# Patient Record
Sex: MALE | Race: WHITE | Employment: FULL TIME | URBAN - METROPOLITAN AREA
[De-identification: names, ages, dates, MRNs, and addresses within clinical notes are randomized per-mention and may not be internally consistent; named-entity substitution may affect disease eponyms.]

---

## 2018-08-14 PROBLEM — I10 ESSENTIAL HYPERTENSION: Status: ACTIVE | Noted: 2018-08-14

## 2019-12-04 PROBLEM — M50.20 CERVICAL DISC HERNIATION: Status: ACTIVE | Noted: 2019-12-04

## 2019-12-04 PROBLEM — M54.10 RADICULOPATHY: Status: ACTIVE | Noted: 2019-12-04

## 2021-10-26 PROBLEM — E11.9 TYPE 2 DIABETES MELLITUS WITHOUT COMPLICATION, WITHOUT LONG-TERM CURRENT USE OF INSULIN (HCC): Status: ACTIVE | Noted: 2021-10-26

## 2022-01-24 PROBLEM — Z80.0 FAMILY HISTORY OF COLON CANCER IN FATHER: Status: ACTIVE | Noted: 2022-01-24

## 2022-11-07 ENCOUNTER — HOSPITAL ENCOUNTER (OUTPATIENT)
Dept: MRI IMAGING | Age: 52
Discharge: HOME | End: 2022-11-09
Payer: COMMERCIAL

## 2022-11-07 DIAGNOSIS — S67.22XA CRUSHING INJURY OF LEFT HAND, INITIAL ENCOUNTER: ICD-10-CM

## 2022-11-07 PROCEDURE — 73218 MRI UPPER EXTREMITY W/O DYE: CPT

## 2022-11-08 ENCOUNTER — OFFICE VISIT (OUTPATIENT)
Dept: FAMILY MEDICINE CLINIC | Age: 52
End: 2022-11-08
Payer: COMMERCIAL

## 2022-11-08 ENCOUNTER — HOSPITAL ENCOUNTER (OUTPATIENT)
Age: 52
Discharge: HOME | End: 2022-11-08
Payer: COMMERCIAL

## 2022-11-08 VITALS
TEMPERATURE: 97.7 F | HEART RATE: 62 BPM | WEIGHT: 315 LBS | DIASTOLIC BLOOD PRESSURE: 70 MMHG | OXYGEN SATURATION: 97 % | SYSTOLIC BLOOD PRESSURE: 110 MMHG | BODY MASS INDEX: 44 KG/M2

## 2022-11-08 DIAGNOSIS — E11.9 TYPE 2 DIABETES MELLITUS WITHOUT COMPLICATION, WITHOUT LONG-TERM CURRENT USE OF INSULIN (HCC): Primary | ICD-10-CM

## 2022-11-08 DIAGNOSIS — E11.9 TYPE 2 DIABETES MELLITUS WITHOUT COMPLICATION, WITHOUT LONG-TERM CURRENT USE OF INSULIN (HCC): ICD-10-CM

## 2022-11-08 DIAGNOSIS — I10 ESSENTIAL HYPERTENSION: ICD-10-CM

## 2022-11-08 DIAGNOSIS — Z13.220 SCREENING FOR HYPERLIPIDEMIA: ICD-10-CM

## 2022-11-08 LAB
ABSOLUTE EOS #: 0.2 K/UL (ref 0–0.4)
ABSOLUTE LYMPH #: 1.9 K/UL (ref 1–4.8)
ABSOLUTE MONO #: 0.5 K/UL (ref 0–1)
ALBUMIN SERPL-MCNC: 4.5 G/DL (ref 3.5–5.2)
ALP BLD-CCNC: 64 U/L (ref 40–129)
ALT SERPL-CCNC: 40 U/L (ref 5–41)
ANION GAP SERPL CALCULATED.3IONS-SCNC: 9 MMOL/L (ref 9–17)
AST SERPL-CCNC: 34 U/L
BASOPHILS # BLD: 1 % (ref 0–2)
BASOPHILS ABSOLUTE: 0 K/UL (ref 0–0.2)
BILIRUB SERPL-MCNC: 0.9 MG/DL (ref 0.3–1.2)
BUN BLDV-MCNC: 22 MG/DL (ref 6–20)
BUN/CREAT BLD: 25 (ref 9–20)
CALCIUM SERPL-MCNC: 9.4 MG/DL (ref 8.6–10.4)
CHLORIDE BLD-SCNC: 100 MMOL/L (ref 98–107)
CHOLESTEROL/HDL RATIO: 5.6
CHOLESTEROL: 189 MG/DL
CO2: 26 MMOL/L (ref 20–31)
CREAT SERPL-MCNC: 0.89 MG/DL (ref 0.7–1.2)
DIFFERENTIAL TYPE: YES
EOSINOPHILS RELATIVE PERCENT: 3 % (ref 0–5)
GFR SERPL CREATININE-BSD FRML MDRD: >60 ML/MIN/1.73M2
GLUCOSE BLD-MCNC: 121 MG/DL (ref 70–99)
HBA1C MFR BLD: 5.7 %
HCT VFR BLD CALC: 45.9 % (ref 41–53)
HDLC SERPL-MCNC: 34 MG/DL
HEMOGLOBIN: 15.5 G/DL (ref 13.5–17.5)
LDL CHOLESTEROL: 135 MG/DL (ref 0–130)
LYMPHOCYTES # BLD: 35 % (ref 13–44)
MCH RBC QN AUTO: 29.4 PG (ref 26–34)
MCHC RBC AUTO-ENTMCNC: 33.7 G/DL (ref 31–37)
MCV RBC AUTO: 87.2 FL (ref 80–100)
MONOCYTES # BLD: 9 % (ref 5–9)
PATIENT FASTING?: YES
PDW BLD-RTO: 13 % (ref 12.1–15.2)
PLATELET # BLD: 180 K/UL (ref 140–450)
POTASSIUM SERPL-SCNC: 4.7 MMOL/L (ref 3.7–5.3)
RBC # BLD: 5.27 M/UL (ref 4.5–5.9)
SEG NEUTROPHILS: 52 % (ref 39–75)
SEGMENTED NEUTROPHILS ABSOLUTE COUNT: 2.8 K/UL (ref 2.1–6.5)
SODIUM BLD-SCNC: 135 MMOL/L (ref 135–144)
TOTAL PROTEIN: 7.6 G/DL (ref 6.4–8.3)
TRIGL SERPL-MCNC: 99 MG/DL
WBC # BLD: 5.4 K/UL (ref 3.5–11)

## 2022-11-08 PROCEDURE — G8484 FLU IMMUNIZE NO ADMIN: HCPCS | Performed by: NURSE PRACTITIONER

## 2022-11-08 PROCEDURE — 3044F HG A1C LEVEL LT 7.0%: CPT | Performed by: NURSE PRACTITIONER

## 2022-11-08 PROCEDURE — 80061 LIPID PANEL: CPT

## 2022-11-08 PROCEDURE — 2022F DILAT RTA XM EVC RTNOPTHY: CPT | Performed by: NURSE PRACTITIONER

## 2022-11-08 PROCEDURE — 83036 HEMOGLOBIN GLYCOSYLATED A1C: CPT | Performed by: NURSE PRACTITIONER

## 2022-11-08 PROCEDURE — 36415 COLL VENOUS BLD VENIPUNCTURE: CPT

## 2022-11-08 PROCEDURE — 1036F TOBACCO NON-USER: CPT | Performed by: NURSE PRACTITIONER

## 2022-11-08 PROCEDURE — G8427 DOCREV CUR MEDS BY ELIG CLIN: HCPCS | Performed by: NURSE PRACTITIONER

## 2022-11-08 PROCEDURE — 3078F DIAST BP <80 MM HG: CPT | Performed by: NURSE PRACTITIONER

## 2022-11-08 PROCEDURE — 80053 COMPREHEN METABOLIC PANEL: CPT

## 2022-11-08 PROCEDURE — 3074F SYST BP LT 130 MM HG: CPT | Performed by: NURSE PRACTITIONER

## 2022-11-08 PROCEDURE — G8417 CALC BMI ABV UP PARAM F/U: HCPCS | Performed by: NURSE PRACTITIONER

## 2022-11-08 PROCEDURE — 3017F COLORECTAL CA SCREEN DOC REV: CPT | Performed by: NURSE PRACTITIONER

## 2022-11-08 PROCEDURE — 85025 COMPLETE CBC W/AUTO DIFF WBC: CPT

## 2022-11-08 PROCEDURE — 99214 OFFICE O/P EST MOD 30 MIN: CPT | Performed by: NURSE PRACTITIONER

## 2022-11-08 SDOH — ECONOMIC STABILITY: FOOD INSECURITY: WITHIN THE PAST 12 MONTHS, YOU WORRIED THAT YOUR FOOD WOULD RUN OUT BEFORE YOU GOT MONEY TO BUY MORE.: NEVER TRUE

## 2022-11-08 SDOH — ECONOMIC STABILITY: FOOD INSECURITY: WITHIN THE PAST 12 MONTHS, THE FOOD YOU BOUGHT JUST DIDN'T LAST AND YOU DIDN'T HAVE MONEY TO GET MORE.: NEVER TRUE

## 2022-11-08 ASSESSMENT — ENCOUNTER SYMPTOMS
NAUSEA: 0
SHORTNESS OF BREATH: 0
COUGH: 0
DIARRHEA: 0
VOMITING: 0

## 2022-11-08 ASSESSMENT — SOCIAL DETERMINANTS OF HEALTH (SDOH): HOW HARD IS IT FOR YOU TO PAY FOR THE VERY BASICS LIKE FOOD, HOUSING, MEDICAL CARE, AND HEATING?: NOT HARD AT ALL

## 2022-11-08 NOTE — PATIENT INSTRUCTIONS
SURVEY:    You may be receiving a survey from [employer] regarding your visit today. Please complete the survey to enable us to provide the highest quality of care to you and your family. If you cannot score us a very good (5 Stars) on any question, please call the office to discuss how we could have made your experience a very good one. Thank you.     Clinical Care Team: NICOL Wilson-INES Chen LPN    Clerical Team: Brain Laguerre

## 2022-11-08 NOTE — PROGRESS NOTES
HPI Notes    Name: Glendy Farias  : 1970         Chief Complaint:     Chief Complaint   Patient presents with    Diabetes Mellitus     Last A1C was 5.4 in     Hypertension       History of Present Illness:        Hypertension  This is a chronic problem. The current episode started more than 1 year ago. The problem has been gradually improving since onset. The problem is controlled. Pertinent negatives include no chest pain, headaches, palpitations, peripheral edema or shortness of breath. Risk factors for coronary artery disease include diabetes mellitus, male gender and obesity. Past treatments include ACE inhibitors. The current treatment provides moderate improvement. There are no compliance problems. Diabetes  He presents for his follow-up diabetic visit. He has type 2 diabetes mellitus. His disease course has been stable. There are no hypoglycemic associated symptoms. Pertinent negatives for hypoglycemia include no dizziness, headaches or seizures. Pertinent negatives for diabetes include no chest pain. Symptoms are stable. Risk factors for coronary artery disease include diabetes mellitus, hypertension, male sex and obesity. Current diabetic treatment includes diet. He is following a generally healthy diet. His breakfast blood glucose is taken between 6-7 am. His breakfast blood glucose range is generally 110-130 mg/dl. An ACE inhibitor/angiotensin II receptor blocker is being taken.      Past Medical History:     Past Medical History:   Diagnosis Date    Arthritis     Hypertension     on meds x 2yrs    Type 2 diabetes mellitus without complication, without long-term current use of insulin ([facility] 10/26/2021    Unspecified sleep apnea     CPAP nightly use      Reviewed all health maintenance requirements and ordered appropriate tests  Health Maintenance Due   Topic Date Due    Pneumococcal 0-64 years Vaccine (1 - PCV) Never done    HIV screen  Never done    Hepatitis C screen  Never done Hepatitis B vaccine (1 of 3 - Risk 3-dose series) Never done    Shingles vaccine (1 of 2) Never done    COVID-19 Vaccine (4 - Booster for Moderna series) 03/06/2022    Lipids  07/06/2022    Diabetic microalbuminuria test  07/20/2022    Flu vaccine (1) 08/01/2022       Past Surgical History:     Past Surgical History:   Procedure Laterality Date    CERVICAL FUSION N/A 12/5/2019    A.C.D.F. C 6-7 performed by Jorge Luis Diego MD at [address] [facility] N/A 1/24/2022    COLONOSCOPY POLYPECTOMY HOT BIOPSY performed by Juan Antonio Richey MD at [address] [place] [address]    left hip surgical repair post fx    HIP SURGERY Left     plates    JOINT REPLACEMENT Left 1997    hip    TONSILLECTOMY          Medications:       Prior to Admission medications    Medication Sig Start Date End Date Taking? Authorizing Provider   lisinopril (PRINIVIL;ZESTRIL) 20 MG tablet Take 1 tablet by mouth daily 6/16/22  Yes Tivis List, APRN - CNP   blood glucose monitor kit and supplies Please dispense glucometer per patient's insurance. 2/7/22  Yes Tivis List, APRN - CNP   Lancets MISC 1 each by Does not apply route 2 times daily Please dispense per patient's insurance 2/7/22  Yes Tivis List, APRN - CNP   blood glucose monitor strips Please check blood glucose daily, dispense per patient's insurance 2/7/22  Yes Tivis List, APRN - CNP   CPAP Machine MISC 1 Units by Does not apply route nightly   Yes Historical Provider, MD        Allergies:       Bactrim    Social History:     Tobacco:    reports that he quit smoking about 7 years ago. His smoking use included cigarettes. He started smoking about 9 years ago. He has a 0.50 pack-year smoking history. He has never used smokeless tobacco.  Alcohol:      reports current alcohol use. Drug Use:  reports no history of drug use.     Family History:     Family History   Problem Relation Age of Onset    Cancer Father         colon    High Blood Pressure Father Asthma Mother     No Known Problems Sister     No Known Problems Brother     Mental Illness Daughter         depression, anxiety       Review of Systems:         Review of Systems   Constitutional:  Negative for chills and fever. Respiratory:  Negative for cough and shortness of breath. Cardiovascular:  Negative for chest pain and palpitations. Gastrointestinal:  Negative for diarrhea, nausea and vomiting. Neurological:  Negative for dizziness, seizures and headaches. Physical Exam:     Vitals:  /70   Pulse 62   Temp 97.7 °F (36.5 °C) (Oral)   Wt (!) 352 lb (159.7 kg)   SpO2 97%   BMI 44.00 kg/m²       Physical Exam  Vitals and nursing note reviewed. Constitutional:       Appearance: He is well-developed. Cardiovascular:      Rate and Rhythm: Normal rate and regular rhythm. Heart sounds: S1 normal and S2 normal.   Pulmonary:      Effort: Pulmonary effort is normal. No respiratory distress. Breath sounds: Normal breath sounds. Abdominal:      General: Bowel sounds are normal.      Palpations: Abdomen is soft. Tenderness: There is no abdominal tenderness. Feet:      Right foot:      Protective Sensation: 8 sites tested. 8 sites sensed. Left foot:      Protective Sensation: 8 sites tested. 8 sites sensed. Skin:     General: Skin is warm and dry. Psychiatric:         Behavior: Behavior is cooperative.              Data:     Lab Results   Component Value Date/Time     07/06/2021 07:13 AM    K 4.8 07/06/2021 07:13 AM     07/06/2021 07:13 AM    CO2 26 07/06/2021 07:13 AM    BUN 18 07/06/2021 07:13 AM    CREATININE 0.94 07/06/2021 07:13 AM    GLUCOSE 141 07/06/2021 07:13 AM    GLUCOSE 114 04/21/2012 08:34 PM    PROT 6.9 07/06/2021 07:13 AM    LABALBU 4.2 07/06/2021 07:13 AM    BILITOT 0.74 07/06/2021 07:13 AM    ALKPHOS 66 07/06/2021 07:13 AM    AST 43 07/06/2021 07:13 AM    ALT 50 07/06/2021 07:13 AM     Lab Results   Component Value Date/Time    WBC 5.9 07/06/2021 07:13 AM    RBC 4.66 07/06/2021 07:13 AM    RBC 5.43 04/21/2012 08:34 PM    HGB 14.2 07/06/2021 07:13 AM    HCT 40.8 07/06/2021 07:13 AM    MCV 87.7 07/06/2021 07:13 AM    MCH 30.4 07/06/2021 07:13 AM    MCHC 34.7 07/06/2021 07:13 AM    RDW 13.5 07/06/2021 07:13 AM     07/06/2021 07:13 AM     04/21/2012 08:34 PM    MPV NOT REPORTED 07/06/2021 07:13 AM     No results found for: TSH  Lab Results   Component Value Date/Time    CHOL 161 07/06/2021 07:13 AM    HDL 33 07/06/2021 07:13 AM    LABA1C 5.4 05/03/2022 06:05 AM          Assessment & Plan        Diagnosis Orders   1. Type 2 diabetes mellitus without complication, without long-term current use of insulin (HCC)   --A1c=5.7% (previously 5.4%). pt doing very well with diet and exercise. Continue same. POCT glycosylated hemoglobin (Hb A1C)    POCT microalbumin    HM DIABETES FOOT EXAM    CBC with Auto Differential    Comprehensive Metabolic Panel      2. Essential hypertension   --BP well controlled at this time. Continue lisinopril 20mg daily. CBC with Auto Differential      3. Screening for hyperlipidemia   --ASCVD risk was 7.5% in 2021. Will recheck lipid panel. Lipid Panel        Patient verbalizes understanding and agreement with plan. All questions answered. If symptoms do not resolve or worsen, return to office. Completed Refills   Requested Prescriptions      No prescriptions requested or ordered in this encounter     No follow-ups on file. No orders of the defined types were placed in this encounter.     Orders Placed This Encounter   Procedures    CBC with Auto Differential     Standing Status:   Future     Standing Expiration Date:   12/13/2023    Comprehensive Metabolic Panel     Standing Status:   Future     Standing Expiration Date:   12/13/2023    Lipid Panel     Standing Status:   Future     Standing Expiration Date:   12/13/2023     Order Specific Question:   Is Patient Fasting?/# of Hours     Answer: 12    POCT glycosylated hemoglobin (Hb A1C)    POCT microalbumin    HM DIABETES FOOT EXAM         Patient Instructions   SURVEY:    You may be receiving a survey from [employer] regarding your visit today. Please complete the survey to enable us to provide the highest quality of care to you and your family. If you cannot score us a very good (5 Stars) on any question, please call the office to discuss how we could have made your experience a very good one. Thank you.     Clinical Care Team: JUANA Wilson LPN    Clerical Team: Charles Alvarado   Electronically signed by NICOL Wilson CNP on 11/8/2022 at 6:29 AM           Completed Refills   Requested Prescriptions      No prescriptions requested or ordered in this encounter

## 2022-11-09 ENCOUNTER — TELEPHONE (OUTPATIENT)
Dept: FAMILY MEDICINE CLINIC | Age: 52
End: 2022-11-09

## 2022-11-09 RX ORDER — ATORVASTATIN CALCIUM 80 MG/1
80 TABLET, FILM COATED ORAL DAILY
Qty: 90 TABLET | Refills: 1 | Status: SHIPPED | OUTPATIENT
Start: 2022-11-09

## 2022-11-09 RX ORDER — ATORVASTATIN CALCIUM 80 MG/1
80 TABLET, FILM COATED ORAL DAILY
COMMUNITY
End: 2022-11-09 | Stop reason: SDUPTHER

## 2022-11-09 NOTE — TELEPHONE ENCOUNTER
Patient notified of lab results and recommendations.    Patient voices understanding and agrees with starting atorvastatin  Rx [facility]

## 2022-11-09 NOTE — TELEPHONE ENCOUNTER
----- Message from NICOL Perez CNP sent at 11/9/2022  8:14 AM EST -----  Please let pt know that his LDL is a little high. When I evaluate him as a 53yo male, on BP meds, and diabetic this all increases his risk of heart attack or stroke. His ASCVD risk = 8.86%. I recommend that he start taking atorvastatin 80mg daily to reduce this risk. All other labs are great!

## 2022-11-10 ENCOUNTER — HOSPITAL ENCOUNTER (OUTPATIENT)
Dept: OCCUPATIONAL THERAPY | Age: 52
Setting detail: THERAPIES SERIES
Discharge: HOME | End: 2022-11-10
Payer: COMMERCIAL

## 2022-11-10 PROCEDURE — 97166 OT EVAL MOD COMPLEX 45 MIN: CPT

## 2022-11-10 NOTE — PROGRESS NOTES
[facility]:       [address], [address] [facility]  Ph: [phone]  Fax: [phone]    OCCUPATIONAL THERAPY EVALUATION     Evaluation Date:  11/10/2022       OT Individual Minutes  Time In: 1410  Time Out: 1500  Minutes: 50    OT Eval mod complexity 50 minutes for 1 unit, CPT 90775     Patient Name:Derek Yuen   Gender: male   YOB: 1970         MRN: [id number]     Physician: Referring Practitioner: Gulshan Diallo PA-C  Diagnosis: Diagnosis: L hand crush injury   Treating diagnosis: R29.898 Other symptoms and signs involving the musculoskeletal systems (decreased ROM, incr edema)                 Referral Date:  11/1/2022          Onset Date: Onset Date: 10/20/22    PMH:  Patient Active Problem List   Diagnosis    Recurrent cellulitis of lower leg    Essential hypertension    Cervical disc herniation    Radiculopathy    Type 2 diabetes mellitus without complication, without long-term current use of insulin ([address]    Family history of colon cancer in father     Past Medical History:   Diagnosis Date    Arthritis     Hypertension     on meds x 2yrs    Type 2 diabetes mellitus without complication, without long-term current use of insulin ([address] 10/26/2021    Unspecified sleep apnea     CPAP nightly use     Past Surgical History:   Procedure Laterality Date    CERVICAL FUSION N/A 12/5/2019    A.C.D.F. C 6-7 performed by Joana Myers MD at [address] [facility] N/A 1/24/2022    COLONOSCOPY POLYPECTOMY HOT BIOPSY performed by Lydia Duckworth MD at [address] [facility]    left hip surgical repair post fx    HIP SURGERY Left     plates    JOINT REPLACEMENT Left 1997    hip    TONSILLECTOMY       Allergies   Allergen Reactions    Bactrim Nausea And Vomiting       Diagnostic imaging: Physician interpretation of imaging tests reviewed.     Medications:    Current Outpatient Medications:     atorvastatin (LIPITOR) 80 MG tablet, Take 1 tablet by mouth daily, Disp: 90 tablet, Rfl: 1    lisinopril (PRINIVIL;ZESTRIL) 20 MG tablet, Take 1 tablet by mouth daily, Disp: 90 tablet, Rfl: 1    blood glucose monitor kit and supplies, Please dispense glucometer per patient's insurance., Disp: 1 kit, Rfl: 0    Lancets MISC, 1 each by Does not apply route 2 times daily Please dispense per patient's insurance, Disp: 100 each, Rfl: 5    blood glucose monitor strips, Please check blood glucose daily, dispense per patient's insurance, Disp: 100 strip, Rfl: 3    CPAP Machine MISC, 1 Units by Does not apply route nightly, Disp: , Rfl:     Visits Allowed/Insurance/Certification Information:   OT Visit Information  Onset Date: 10/20/22  OT Insurance Information: [address]  Total # of Visits Approved: 12  Progress Note Due Date: 02/10/22  Progress Note Counter: 1    Restrictions/Precautions None per patient report         English primary language:  yes    Transfer of pt care required: no     SUBJECTIVE FINDINGS     Support contact: wife       Pt lives: spouse and children daughter   Home:  split level  Entrance:  6 stairs into the house,   Bathroom: tub/shower combo   DME: None     Pain:  Pt. reports no pain in L hand, stiffness only      Max pain at home during activities: 2/10  Lowest pain at home during activities/rest: 0/10    Action for pain:   Patient reports pain is at acceptable level for treatment. Prior Level of Function:    Patient was performing at independent level for ADL and IADL activities prior to incident/injury/condition. Work Status:  Pt employed full time as , /unload trucks. Is this a work related injury: yes   Is this a [facility] claim: yes      Driving:yes      Hobbies, Leisure, social activities: none     Previous OT treatments for this condition: no.    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     Current Functional Limitations Per Patient Report:   Orientation: Oriented x 3  Communication: No concerns   Hearing: No concerns   Perception: No concerns    Vision:  WFL              Feeding: No concerns   Grooming: No concerns   Bathing: No concerns   UE dressing: No concerns   LE dressing: No concerns   Toileting: No concerns   Toilet transfer: No concerns   Tub/Shower transfer: No concerns     Cooking:  wife performs  Cleaning: WFL  Laundry: WFL     Sleep: No concerns     Medication management: No concerns     Patient goal for therapy: \"To get the swelling out of my L hand. \"       OBSERVATION:   Symmetrical posture     OBJECTIVE FINDINGS    Hand Dominance: right       Upper Extremity Strength and Range of Motion      Right  Left    MMT A P Norm  A P MMT   Shoulder          Flexion 5/5 WFL NT 0-180 WFL NT 5/5   Abduction 5/5 WFL NT 0-180 WFL NT 5/5   Internal Rotation 5/5 WFL NT 0-80 WFL NT 5/5   External Rotation 5/5 WFL NT 0-60 WFL NT 5/5   Elbow          Flexion 5/5 WFL NT 0-150 WFL NT 5/5   Extension 5/5 WFL -0 WFL NT 5/5   Pronation 5/5 WFL NT 0-80 WFL NT 5/5   Supination 5/5 WFL NT 0-80 WFL NT 5/5   Wrist          Flexion 5/5 WFL NT 0-70 WFL NT 4+/5   Extension  5/5 WFL NT 0-60 WFL NT 4+/5   Ulnar deviation 5/5 WFL NT 0-30 WFL NT 4+/5   Radial Deviation  5/5 WFL NT 0-20 WFL NT 4+/5   Comments:       Hand Range of Motion      Right  Left   Normal  MP PIP DIP  MP PIP DIP    0-90 0-100 0-70  0-90 0-100 0-70    A P A P A P  A P A P A P   Index                Extension WFL NT WFL NT WFL NT  WFL NT WFL NT WFL NT   Flexion WFL NT WFL NT WFL NT  WFL NT WFL NT WFL NT   Middle                Extension WFL NT WFL NT WFL NT  WFL NT WFL NT WFL NT   Flexion WFL NT WFL NT WFL NT  WFL NT WFL NT WFL NT   Ring                Extension WFL NT WFL NT WFL NT  WFL NT WFL NT WFL NT   Flexion  WFL NT WFL NT WFL NT  WFL NT WFL NT WFL NT   Little                 Extension WFL NT WFL NT WFL NT  WFL NT WFL NT WFL NT   Flexion  WFL NT [facility] NT [facility] NT  WFL NT [facility] NT [facility] NT   Comments:       Right   Left Norms    A P  A P    Thumb         MP Flexion WFL NT  WFL NT 0-70   IP Flexion WFL NT  WFL NT 0-90   Radial Abduction WFL NT  WFL NT 0-50   Palmar Adduction WFL NT  WFL NT 0-40   Comments:    Opposition  Right Hand: WFL  Left Hand: WFL    Distance Thumb Tip from Head of 5th MC: measurements cm   Right Hand: 0  Left Hand: 0    Edema  Circumferential measurements cm    Right Left   MID HAND 26.7 28.6   Wrist (across styloid) 20.5 21.5   Metacarpal Phalangeal 24 26        & Pinch Strength  Average of 3 tries Right Norm Left Norm    (lb) 80 Male age 48-48: 80 lbs 70 Male age 48-48: 80 lbs   [place] Pinch (lb) 23 Male age 48-48: 18.0 lbs 25 Male age 48-48: 17.0 lbs   Lateral Pinch (lb) 32 Male age 48-48: 20.0 lbs 22 Male age 48-48: 18.5 lbs   Comments:    Coordination & Dexterity  Comments: WFL for LUE, however occasionally drops items due to stiffness and numbness of hand. Skin Integrity  5 CM well healed, dry, peeling scar from ulnar side of hand across palm, mid palm area. Scar is tight and puckers slightly over 5th metacarpal.    Cognition:    No issues noted on evaluation. Sensation:     Impaired  Pt reports numbness and tingling in pinky and Pt reports tingling intermittent D2-4. Tone:   normal tone      Joint Mobility  Tightness noted between 4th and 5th Metacarpals L hand    Palpation/Tenderness  Tenderness and \"bruised feeling\" ulnar side of L hand, especially along scar.     Education/Barriers to learning:     Barriers:none   Education on this date:  retrograde massage L hand and OT role and POC     ASSESSMENT    Pt is a 47 y.o. male     Problems:  [x] Decreased UE strength   [] Decreased UE ROM   [x] Decreased  strength   [x] Decreased fine motor skills   [] Increased pain    [] Decreased ADL status   [x] Decreased joint mobility   [] Decreased coordination   [x] Decreased sensation    [] Other:      Complexity:         [] Low Complexity:   History: Brief history including review of medical records relating to the problem  Exam: 1-3 performance Deficits  Assistance/Modification: No assistance or modifications required to perform tasks. No comorbities affecting occupational performance  [x] Medium Complexity:   History: Expanded review of medical records and additional review of physical, cognitive, or psychosocial history related to current functional performance  Exam: 3-5 performance deficits  Assistance/Modification: Min/mod assistance or modifications required to perform tasks. May have comorbidities that affect occupational performance. [] High Complexity:   History: Extensive review of medical records and additional review of physical, cognitive, or psychosocial history related to current functional performance. Exam: 5 or more performance deficits  Assistance/Modification: Significant assistance or modifications required to perform tasks. Have comorbidities that affect occupational performance. Outcome Measure(s) Completed:   Upper Extremity Functional Index   Today, do you or would you have any difficulty at all with:   Any of your usual work, housework, or school activities[de-identified] No difficulty  Your usual hobbies, re creational or sporting activities[de-identified] No difficulty  Lifting a bag of groceries to waist level[de-identified] No difficulty  Lifting a bag of groceries above your head[de-identified] No difficulty  Grooming your hair[de-identified] No difficulty  Pushing up on your hands (eg from bathtub or chair):: No difficulty  Preparing food (eg peeling, cutting):: No difficulty  Driving[de-identified] No difficulty  Vacuuming, sweeping or raking[de-identified] No difficulty  Dressing[de-identified] No difficulty  Doing up buttons[de-identified] No difficulty  Using tools or appliances[de-identified] No difficulty  Opening doors[de-identified] No difficulty  Cleaning[de-identified] No difficulty  Tying or lacing shoes[de-identified] No difficulty  Sleeping[de-identified] No difficulty  Laundering clothes (eg washing, ironing, folding):: No difficulty  Opening a jar[de-identified] No difficulty  Throwing a ball[de-identified] No difficulty  Carrying a small suitcase with your affected limb[de-identified] No difficulty  UEFI Total Score: 80  UEFI Total Percentage: 100 %     Total Score (out of 80) - if applicable: 80   Current Percentage of Function: 100 % % (Date: 11/10/2022)    Interpretation of Score: The final UEFI score ranges between 0 and 80 points. Scores closer to 0 indicate severe limitation whilst scores closer to 80 indicate very little to no limitation. The significant change ([address]) between two subsequent evaluations is set at 9 points. Higher Score indicates less disability, more function. Rehabilitation Potential:    [x] Excellent [] Good  [] Fair  [] Poor      PLAN OF CARE     To see patient for 1-2 x/week for 4-5 weeks or 6-12 visits for the following treatment interventions:    [x] Evaluate & Treat [] Neuromuscular Re-education:     [x] Re-evaluation [] Tissue (stress) Loading Program    [] Pain Management  [x] PROM/Stretching/AAROM/AROM    [x] Edema Management  [] Splinting    [x] Wound Care/Scar Management  [x] Desensitization    [] ADL Training  [x] Strengthening/Graded Therapeutic Activity    [] Tendon Repair Program  [] Coordination/Dexterity Training    [x] Instruction/Application of energy [x] Manual Techniques        conservation, work simplification [x] Instruction in HEP        joint protection, body mechanics [] Aquatic Therapy    [x] Modalities [x]  Ultrasound           [] Infrared [] Hot/Cold Pack:          [] Paraffin   [] Other:   [] Electrical Stimulation [x] Fluidotherapy     [x] GOLD able to work with pt   [x] D/C plan: Will assess pt after established visits to determine need for continued therapy. GOALS:     LTG 1 : Patient  will be indep with all recommended HEP's, adaptive strategies, and adaptive techniques. LTG 2 : Patient  will increase LUE strength from current by 1/2 muscle grade  to increase performance with I/ADL's.    LTG 3 : Patient  will increase L  strength from current by 10-15 lbs to increase performance with I/ADL's. LTG 4 : Patient  will increase L pinch strength from current by 2-3 lbs to increase performance with I/ADL's. LTG 5 : Patient will improve  LUE sensation and/or utilize compensatory techniques for safe completion of self-care as projected. LTG 6 : Pt. will be able to lift and carry up to 50 Lbs using BUEs without pain. JIM Bermudez 11/10/2022 3:12 PM    Falls Risk Assessment     Age: 0-59 = 0          60-69= 1            > 70= 2 History of Falls:   0  Falls  last 6 mo = 0    1  fall  Last  6 mo = 1   1-3 falls last 6 mo = 2 Medical History:   Parkinsons, CVA,HTN, vertigo, >4 meds, use of assistive device (1pt.for each)  Mental Status:  A & O x 3 = 0  Disoriented to person, place, or time = 2     [x]  INITIAL ASSESSMENT:                                                      Date: 11/10/2022                                                  Age:   1                                                         Falls: 0                                                          PMH: 1                                                          Mental: 0                                                       Total:  1                                                        *Patient 4 or younger:   Vestibular:     Signature: FAHAD Bermudez/GERI                                                      High Risk for Falls: >8  Intermediate Risk for Falls: 4-8   Low Risk for Falls: <4    * All pediatric patients (age 3 or younger) and vestibular patients will be considered high risk for falls- scoring does not need to be completed - treat as high risk. Interventions     Low Risk: Monitor for changes, reassess every three months. Intermediate Risk: Supervision for most activities, direct contact with new activities or equipment, reassess monthly. High Risk: Stand by assitance at all times, reassess monthly.      The following patient has been evaluated for occupational therapy services. For therapy to continue, insurance requires physician review of the treatment plan. Please review the attached evaluation and/or summary of the patient's plan of care, and verify that you agree therapy should continue by signing the attached document and sending it back to our office.  Thank you for this referral.    Physician signature____________________________________     Date________________

## 2022-11-14 ENCOUNTER — HOSPITAL ENCOUNTER (OUTPATIENT)
Dept: OCCUPATIONAL THERAPY | Age: 52
Setting detail: THERAPIES SERIES
Discharge: HOME | End: 2022-11-14
Payer: COMMERCIAL

## 2022-11-14 PROCEDURE — 97140 MANUAL THERAPY 1/> REGIONS: CPT

## 2022-11-14 PROCEDURE — 97530 THERAPEUTIC ACTIVITIES: CPT

## 2022-11-14 NOTE — PROGRESS NOTES
ulnar side of L hand. Exercises/Activities:  AAROM for hook, roof top and flat fist completed 5 reps  Self mobilization of palm performed using firm ball and tennis ball, 10 times CW/CCW each  5th digit abduction against light resistance rubber band, 2 x 10 reps  Instructed in and performed blue therasponge HEP. Patient was screened for contraindications prior to use of modality. Fluidotherapy at 115° F for 20 minutes while completing AROM of L hand with grasping and releasing medium. Patient Education/HEP:   hand strengthening: Patient issued blue foam block. , Pt completed 10 reps to demo understanding. Pt with good follow through. ASSESSMENT       Assessment: Pt tolerated treatment well. Pt. has difficulty flexing MP joint of 4th and 5th digit due to soft tissue edema. Pain at these joints with PROM. Scar mobility improving with appearance of skin improving also. Pt. demo good understanding of HEP. Pt. to perform HEP 3-4 x a day. Post Treatment Pain: Pt denies pain    Patient's Activity Tolerance: good                 GOALS         Long Term Goals  Time Frame for Long Term Goals : 6-12 visits  Long Term Goal 1: Patient  will be indep with all recommended HEP's, adaptive strategies, and adaptive techniques. Long Term Goal 2: Patient  will increase LUE strength from current by 1/2 muscle grade  to increase performance with I/ADL's. Long Term Goal 3: Patient  will increase L  strength from current by 10-15 lbs to increase performance with I/ADL's. Long Term Goal 4: Patient  will increase L pinch strength from current by 2-3 lbs to increase performance with I/ADL's. Long Term Goal 5: Patient will improve  LUE sensation and/or utilize compensatory techniques for safe completion of self-care as projected. Long Term Goal 6: Pt. will be able to lift and carry up to 50 Lbs using BUEs without pain.          TREATMENT PLAN   2x a week for improving MP flexion D4 & D5 and decrease edema for improved function of hand.            Electronically signed by JIM Ramirez  on 11/14/2022 at 10:08 AM

## 2022-11-17 ENCOUNTER — HOSPITAL ENCOUNTER (OUTPATIENT)
Dept: OCCUPATIONAL THERAPY | Age: 52
Setting detail: THERAPIES SERIES
Discharge: HOME | End: 2022-11-17
Payer: COMMERCIAL

## 2022-11-17 PROCEDURE — 97140 MANUAL THERAPY 1/> REGIONS: CPT

## 2022-11-17 PROCEDURE — 97530 THERAPEUTIC ACTIVITIES: CPT

## 2022-11-17 NOTE — PROGRESS NOTES
[facility]     Occupational Therapy: Daily Note   Patient: Jose De La Rosa (13 y.o. male)   Date:   Plan of Care Certification Period:      :  1970  MRN: [id number]  CSN: [id number]   Insurance: Payor: Maritza Rides / Plan: Maritza Rides / Product Type: *No Product type* /   Insurance ID: [id number] - (Worker's Comp) Secondary Insurance (if applicable):    Referring Physician: Carter Soto MD     PCP: NICOL Martin CNP Visits to Date: Total # of Visits to Date: 3   Progress note:Progress Note Counter: 3  Visits Approved: 12    No Show:    Cancelled Appts:      Medical Diagnosis: Laceration without foreign body of left hand, initial encounter [S61.412A]  Crushing injury of left hand, initial encounter [S67.22XA]  Contusion of left hand, initial encounter [S60.222A] L hand crush injury        Therapy Time     Time in 0725   Time out 0819   Total treatment minutes 54   Total time code minutes  54 Minutes        OT Manual therapy 10 minutes for 1 unit(s), CPT 83896  7:45-7:55 am  OT Therapeutic activities 44 minutes for 3 unit(s), CPT 31608 7:25-7:45 am;  7:55-8:18 am       SUBJECTIVE EXAMINATION     Patient's date of birth confirmed: Yes     Pain Level:   Pt denies pain    Patient Comments:   \"I don't really have pain, but I have soreness and numbness ulnar side of my hand. \"    Learning/Language barrier: n/a     HEP/Strategies/Orthosis Compliance: Patient verbally confirmed compliant with HEP's     Restrictions: none      OBJECTIVE EXAMINATION       TREATMENT     Focus of treatment was on the following:   improving soft tissue mobility and increase AROM of 4th & 5th digit. MFR/Manual:   Soft tissue mobilization of entire L hand and cross friction massage over entire scar ulnar side of hand. Provided sensory stimulation using small vibrator to all surfaces L hand.     Exercises/Activities LUE:  Rolling firm textured ball and tennis ball from palm to fingertips in circular motions. Grasping and releasing tennis ball x 10 reps  Wrist flex/ext AROM with light fisted position x 15 reps  Red power web resistance:  Repetitive grasp x 10            Fingertip grasp and pull x 10            Press w/ open palm and finger extension x 10            Push into web w/ fisted hand x 10  Isolated digit extension w/ palm flat on table, 5 reps each digit  Instructed in and performed tendon gliding fisting HEP . Fluidotherapy at 115° F for 20 minutes while completing AROM of L hand and wrist.  Intermittently squeezing foam sponge. Patient Education/HEP:   tendon gliding, Pt completed 10 reps to demo understanding. Pt with good follow through., Written hand out(s) provided. ASSESSMENT       Assessment: Pt tolerated treatment well. Improved mobility noted as pt. now able to obtain hook fist position. Mild edema remains ulnar side of hand with numbness/tingling present this area. Pt. demo good follow through with HEP. Post Treatment Pain: Pt denies pain    Patient's Activity Tolerance: good                 GOALS         Long Term Goals  Time Frame for Long Term Goals : 6-12 visits  Long Term Goal 1: Patient  will be indep with all recommended HEP's, adaptive strategies, and adaptive techniques. Long Term Goal 2: Patient  will increase LUE strength from current by 1/2 muscle grade  to increase performance with I/ADL's. Long Term Goal 3: Patient  will increase L  strength from current by 10-15 lbs to increase performance with I/ADL's. Long Term Goal 4: Patient  will increase L pinch strength from current by 2-3 lbs to increase performance with I/ADL's. Long Term Goal 5: Patient will improve  LUE sensation and/or utilize compensatory techniques for safe completion of self-care as projected. Long Term Goal 6: Pt. will be able to lift and carry up to 50 Lbs using BUEs without pain.          TREATMENT PLAN   1 x week per pt. request due to travel distance and work schedule. Increase ROM, decrease numbness and improve functional mobility of LUE.            Electronically signed by JIM Owen  on 11/17/2022 at 9:03 AM

## 2022-11-21 ENCOUNTER — HOSPITAL ENCOUNTER (OUTPATIENT)
Dept: OCCUPATIONAL THERAPY | Age: 52
Setting detail: THERAPIES SERIES
Discharge: HOME | End: 2022-11-21
Payer: COMMERCIAL

## 2022-11-21 PROCEDURE — 97530 THERAPEUTIC ACTIVITIES: CPT

## 2022-11-21 PROCEDURE — 97140 MANUAL THERAPY 1/> REGIONS: CPT

## 2022-11-21 NOTE — PROGRESS NOTES
[facility]     Occupational Therapy: Daily Note   Patient: Emelia Downey (6 y.o. male)   Date:   Plan of Care Certification Period:      :  1970  MRN: [id number]  CSN: [id number]   Insurance: Payor: Sd Connelly / Plan: Sd Connelly / Product Type: *No Product type* /   Insurance ID:  - (Worker's Comp) Secondary Insurance (if applicable):    Referring Physician: Mariana Gamble MD     PCP: NICOL Jordan CNP Visits to Date: Total # of Visits to Date: 4   Progress note:Progress Note Counter: 4  Visits Approved: 12    No Show:    Cancelled Appts:      Medical Diagnosis: Laceration without foreign body of left hand, initial encounter [S61.412A]  Crushing injury of left hand, initial encounter [S67.22XA]  Contusion of left hand, initial encounter [S60.222A] L hand crush injury        Therapy Time     Time in 0850   Time out 0948   Total treatment minutes 58   Total time code minutes  58 Minutes        OT Manual therapy 8 minutes for 1 unit(s), CPT 52222  OT Therapeutic activities 50 minutes for 3 unit(s), CPT 73071       SUBJECTIVE EXAMINATION     Patient's date of birth confirmed: Yes     Pain Level:   Pt denies pain    Patient Comments:   \"I don't have pain, I have soreness and stiffness in my L hand. \"    Learning/Language barrier: no     HEP/Strategies/Orthosis Compliance: Patient verbally confirmed compliant with HEP's      Restrictions: none         OBJECTIVE EXAMINATION     TREATMENT     Focus of treatment was on the following:   improving mobility of L hand to improve ROM      MFR/Manual:   Deep soft tissue mobilization of palm and between metacarpals followed with scar massage of ulnar hand scar    Exercises/Activities L hand:  Repetitive  w/ rubberband hand gripper, 2 x 10 reps   and hold x 3 sec with 10 lb. hand gripper, 2 x 10 reps  Tip to tip pinch each digit x 5 reps using graded clips 1,2 & 4 lb with ring and pinky digit, 1,2,4,6 & 8 lb. with index and middle digit. Digit blocking AAROM 4th and 5th digit, 5 reps each joint. Fluidotherapy at 115° F for 20 minutes while completing AROM of L hand and wrist for improving mobility of hand. Patient Education/HEP:   Continue recommended HEP/activities. ASSESSMENT       Assessment: Pt tolerated treatment well. Pt. making good progress with reports of soreness, no pain in L hand. Pt. able to obtain gross fist at end of session with decreased flexion noted in MP joint 5th digit. Post Treatment Pain: Pt denies pain    Patient's Activity Tolerance: good                 GOALS         Long Term Goals  Time Frame for Long Term Goals : 6-12 visits  Long Term Goal 1: Patient  will be indep with all recommended HEP's, adaptive strategies, and adaptive techniques. Long Term Goal 2: Patient  will increase LUE strength from current by 1/2 muscle grade  to increase performance with I/ADL's. Long Term Goal 3: Patient  will increase L  strength from current by 10-15 lbs to increase performance with I/ADL's. Long Term Goal 4: Patient  will increase L pinch strength from current by 2-3 lbs to increase performance with I/ADL's. Long Term Goal 5: Patient will improve  LUE sensation and/or utilize compensatory techniques for safe completion of self-care as projected. Long Term Goal 6: Pt. will be able to lift and carry up to 50 Lbs using BUEs without pain. TREATMENT PLAN   Continue 1x week until pt. is laid off from work. Will focus on ROM and strengthening for improved functional use.            Electronically signed by JIM Phillips  on 11/21/2022 at 10:15 AM

## 2022-11-28 ENCOUNTER — HOSPITAL ENCOUNTER (OUTPATIENT)
Dept: OCCUPATIONAL THERAPY | Age: 52
Setting detail: THERAPIES SERIES
Discharge: HOME | End: 2022-11-28
Payer: COMMERCIAL

## 2022-11-28 PROCEDURE — 97530 THERAPEUTIC ACTIVITIES: CPT

## 2022-11-28 PROCEDURE — 97140 MANUAL THERAPY 1/> REGIONS: CPT

## 2022-11-28 NOTE — PROGRESS NOTES
[facility]     Occupational Therapy: Daily Note   Patient: Anahi Cosby (78 y.o. male)   Date:   Plan of Care Certification Period:      :  1970  MRN: [id number]  CSN: [id number]   Insurance: Payor: Alesah Friend / Plan: Alesha Friend / Product Type: *No Product type* /   Insurance ID:  - (Worker's Comp) Secondary Insurance (if applicable):    Referring Physician: Kleber Martinez MD     PCP: NICOL Rinaldi CNP Visits to Date: Total # of Visits to Date: 5   Progress note:Progress Note Counter: 5  Visits Approved: 12    No Show:    Cancelled Appts:      Medical Diagnosis: Laceration without foreign body of left hand, initial encounter [S61.412A]  Crushing injury of left hand, initial encounter [S67.22XA]  Contusion of left hand, initial encounter [S60.222A] L hand crush injury        Therapy Time     Time in 0725   Time out 0822   Total treatment minutes 57   Total time code minutes  57 Minutes        OT Manual therapy 8 minutes for 1 unit(s), CPT 18887  8:14-8:22 AM  OT Therapeutic activities 49 minutes for 3 unit(s), CPT 82310 7:25-8:14AM       SUBJECTIVE EXAMINATION     Patient's date of birth confirmed: Yes     Pain Level:   Pt denies pain    Patient Comments:   \"I only have intermittent pain with certain things, but I have numbness and tingling in my pinky finger and tip of my ring finger all the time. \"    Learning/Language barrier: no     HEP/Strategies/Orthosis Compliance: Patient verbally confirmed compliant with HEP's     Restrictions: none      OBJECTIVE EXAMINATION   Edema  Circumferential measurements cm     Right Left   MID HAND 26.7 27.4   Wrist (across styloid) 20.5 22   Metacarpal Phalangeal 24 25.6         Average of  Three tries   LEFT  CURRENT      LEFT        Eval     Left     Norm       Oje lb. 80 71 91   PALMAR  Pinch  Lb. 18 18 17   LATERAL  Pinch  Lb. 27 25 18.5        TREATMENT     Focus of treatment was on the following:   strengthening LUE and assess for progress toward goals     Re-measured LUE as above. MFR/Manual:   Soft tissue mobilization performed L hand, especially between ring and pinky metacarpal bones,  Followed with retrograde massage with lotion. Exercises/Activities LUE for strengthening and ROM:  Self mobilization of wrist and palm rolling large velcro resistance roller along board, 10 reps   Wrist and hand AROM using motor planning wire to move disc each direction, 3 reps each  15 lb. theraband flexbar, forearm pronated, grasp and flex x 10                  forearm supinated, grasp and flex x 10     Red power web, grasp and pull with fingertips, 2 x 10       Push into web with fist, 2 x 10  Fluidotherapy at 115° F for 20 minutes while completing AROM L hand, gripping foam ball. Patient Education/HEP:   Continue recommended HEP/activities. ASSESSMENT       Assessment: Pt reported decreased  pain after OT treatment. Pt. progressing well toward all goals, improved strength with decreased edema in L hand. Scar mobility improving also. Pt. demo good follow through with HEP. Continues with numbness/tingling pinky digit. Post Treatment Pain: Pt denies pain    Patient's Activity Tolerance: good          GOALS         Long Term Goals  Time Frame for Long Term Goals : 6-12 visits  Long Term Goal 1: Patient  will be indep with all recommended HEP's, adaptive strategies, and adaptive techniques. Long Term Goal 2: Patient  will increase LUE strength from current by 1/2 muscle grade  to increase performance with I/ADL's. Long Term Goal 3: Patient  will increase L  strength from current by 10-15 lbs to increase performance with I/ADL's. Long Term Goal 4: Patient  will increase L pinch strength from current by 2-3 lbs to increase performance with I/ADL's.   Long Term Goal 5: Patient will improve  LUE sensation and/or utilize compensatory techniques for safe completion of self-care as projected. Long Term Goal 6: Pt. will be able to lift and carry up to 50 Lbs using BUEs without pain.          TREATMENT PLAN   Continue POC           Electronically signed by Lawerence Phalen, OTR/L  on 11/28/2022 at 8:52 AM

## 2022-11-30 ENCOUNTER — HOSPITAL ENCOUNTER (OUTPATIENT)
Dept: OCCUPATIONAL THERAPY | Age: 52
Setting detail: THERAPIES SERIES
Discharge: HOME | End: 2022-11-30
Payer: COMMERCIAL

## 2022-11-30 PROCEDURE — 97140 MANUAL THERAPY 1/> REGIONS: CPT

## 2022-11-30 PROCEDURE — 97530 THERAPEUTIC ACTIVITIES: CPT

## 2022-11-30 PROCEDURE — 97110 THERAPEUTIC EXERCISES: CPT

## 2022-11-30 NOTE — PROGRESS NOTES
[facility]     Occupational Therapy: Daily Note   Patient: Urbano Price (67 y.o. male)   Date:   Plan of Care Certification Period:      :  1970  MRN: [id number]  CSN: [id number]   Insurance: Payor: [employer] / Plan: [employer] / Product Type: *No Product type* /   Insurance ID: [id number] (Worker's Comp) Secondary Insurance (if applicable):    Referring Physician: Tiffany Stephens MD     PCP: NICOL Madrigal CNP Visits to Date: Total # of Visits to Date: 6   Progress note:Progress Note Counter: 6  Visits Approved: 12    No Show:    Cancelled Appts:      Medical Diagnosis: Laceration without foreign body of left hand, initial encounter [S61.412A]  Crushing injury of left hand, initial encounter [S67.22XA]  Contusion of left hand, initial encounter [S60.222A] L hand crush injury        Therapy Time     Time in 1100   Time out 1159   Total treatment minutes 59   Total time code minutes  59 Minutes        OT Manual therapy 8 minutes for 1 unit(s), CPT 10015  OT Therapeutic activities 20 minutes for 1 unit(s), CPT 70417  OT Therapeutic exercises 31 minutes for 2 unit(s), CPT 82145       SUBJECTIVE EXAMINATION     Patient's date of birth confirmed: Yes     Pain Level:   Pt denies pain    Patient Comments:   \"I am noticing some bruising on the back of my hand, especially after I use massager. But it doesn't hurt. \"    Learning/Language barrier: no     HEP/Strategies/Orthosis Compliance: Patient verbally confirmed compliant with HEP's     Restrictions: none      OBJECTIVE EXAMINATION       TREATMENT     Focus of treatment was on the following:   reducing fascial restrictions ulnar side of L hand and improving AROM.      MFR/Manual:   Deep pressure soft tissue mobilization performed over entire ulnar side of L hand, cross friction massage across scar also completed. Exercises/Activities L hand:  Forearm sup/pronation weighted stretch w/ 1.5 lb. hammer, 20 reps w/ 5 sec hold at end range. Light resistance Yellow power web:   press with flat hand x 10       push with fist x 10       Push with ulnar side of hand x 10  All Digit extension to place various resistance rubberbands around wide end of cone, 15 bands  Tip to tip pinch with thumb and pinky digit, 10 reps w/ 1 # clip and 10 reps w/ 2 lb. clip  Fluidotherapy at 115° F for 20 minutes while completing AROM fisting of L hand with wrist flex/ext. Patient Education/HEP:   Continue recommended HEP/activities. ASSESSMENT       Assessment: Pt tolerated treatment well. Pt. continues with moderately thickened soft tissue ulnar side of L hand,  distal to wrist joint. Pt. able to obtain gross grasp with L hand with improved ROM. Pain mildly increased at end of session. Post Treatment Pain: 2/10    Patient's Activity Tolerance: good            GOALS         Long Term Goals  Time Frame for Long Term Goals : 6-12 visits  Long Term Goal 1: Patient  will be indep with all recommended HEP's, adaptive strategies, and adaptive techniques. Long Term Goal 2: Patient  will increase LUE strength from current by 1/2 muscle grade  to increase performance with I/ADL's. Long Term Goal 3: Patient  will increase L  strength from current by 10-15 lbs to increase performance with I/ADL's. Long Term Goal 4: Patient  will increase L pinch strength from current by 2-3 lbs to increase performance with I/ADL's. Long Term Goal 5: Patient will improve  LUE sensation and/or utilize compensatory techniques for safe completion of self-care as projected. Long Term Goal 6: Pt. will be able to lift and carry up to 50 Lbs using BUEs without pain.          TREATMENT PLAN   Continue POC           Electronically signed by Lawerence Phalen, OTR/L  on 11/30/2022 at 12:04 PM

## 2022-12-05 ENCOUNTER — HOSPITAL ENCOUNTER (OUTPATIENT)
Dept: OCCUPATIONAL THERAPY | Age: 52
Setting detail: THERAPIES SERIES
Discharge: HOME | End: 2022-12-05
Payer: COMMERCIAL

## 2022-12-05 PROCEDURE — 97530 THERAPEUTIC ACTIVITIES: CPT

## 2022-12-05 NOTE — PROGRESS NOTES
[facility]     Occupational Therapy: Daily Note   Patient: Jaya Barron (66 y.o. male)   Date:   Plan of Care Certification Period:  01/10/22   :  1970  MRN: [id number]  CSN: [id number]   Insurance: Payor: Patience Calamity / Plan: Patience Calamity / Product Type: *No Product type* /   Insurance ID:  - (Worker's Comp) Secondary Insurance (if applicable):    Referring Physician: Brianda Galeana MD     PCP: NICOL Ross - CNP Visits to Date: Total # of Visits to Date: 7   Progress note:Progress Note Counter: 7  Visits Approved: 12    No Show:    Cancelled Appts:      Medical Diagnosis: Laceration without foreign body of left hand, initial encounter [S61.412A]  Crushing injury of left hand, initial encounter [S67.22XA]  Contusion of left hand, initial encounter [S60.222A] L hand crush injury        Therapy Time     Time in 0730   Time out 0830   Total treatment minutes 60   Total time code minutes  60 Minutes        OT Therapeutic activities 60 minutes for 4 unit(s), CPT 93234       SUBJECTIVE EXAMINATION     Patient's date of birth confirmed: Yes     Pain Level:   Pt denies pain    Patient Comments:   \"I don't have pain, I have uncomfortableness in that side of my hand when I make a fist.  Uncomfortable is like a 5-6/10 discomfort. \"    Learning/Language barrier: no     HEP/Strategies/Orthosis Compliance: Patient verbally confirmed compliant with HEP's Patient demo understanding. Restrictions: none. 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 OBJECTIVE EXAMINATION     TREATMENT     Focus of treatment was on the following:   strengthening L wrist and hand      Fluidotherapy at 115° F for 20 minutes while completing AROM of L wrist and hand, gripping foam ball for fisting position.     Exercises/Activities LUE:  AROM finger ab/adduction stretch, holding full finger spread 3 sec, 10 reps  Self mob rolling firm ball in palm of hand CW/CCW, 10 reps  Finger flex/extension strength using mod resistance hand Xtrainer, 10 reps each  Large piece red theraputty, kneading and pressing putty repetitively with palm and fingers, 5 minutes total  Instructed in and performed red theraputty HEP, 5 ex x 10 reps  Instructed in use of Topigel scar sheeting for scar mobilization. Patient Education/HEP:   hand strengthening: Patient issued red theraputty. , Pt completed 10 reps to demo understanding. Pt with good follow through., Written hand out(s) provided., Topigel scar sheeting application and removal for scar mob. ASSESSMENT       Assessment: Pt tolerated treatment well. Pt making good  progress towards goals. Scar mobility improving with less indention in ulnar side of hand noted. Pt. continues with numbness/tingling of ring and pinky digit. Good follow through with HEP noted. Post Treatment Pain: Pt denies pain    Patient's Activity Tolerance: good                 GOALS         Long Term Goals  Time Frame for Long Term Goals : 6-12 visits  Long Term Goal 1: Patient  will be indep with all recommended HEP's, adaptive strategies, and adaptive techniques. Long Term Goal 2: Patient  will increase LUE strength from current by 1/2 muscle grade  to increase performance with I/ADL's. Long Term Goal 3: Patient  will increase L  strength from current by 10-15 lbs to increase performance with I/ADL's. Long Term Goal 4: Patient  will increase L pinch strength from current by 2-3 lbs to increase performance with I/ADL's. Long Term Goal 5: Patient will improve  LUE sensation and/or utilize compensatory techniques for safe completion of self-care as projected. Long Term Goal 6: Pt. will be able to lift and carry up to 50 Lbs using BUEs without pain. TREATMENT PLAN   2 x week for sensory skills ulnar digits, strengthening and functional lifting with L hand.            Electronically signed by JIM Quintero  on 12/5/2022 at 8:43 AM

## 2022-12-08 ENCOUNTER — HOSPITAL ENCOUNTER (OUTPATIENT)
Dept: OCCUPATIONAL THERAPY | Age: 52
Setting detail: THERAPIES SERIES
Discharge: HOME | End: 2022-12-08
Payer: COMMERCIAL

## 2022-12-08 PROCEDURE — 97140 MANUAL THERAPY 1/> REGIONS: CPT

## 2022-12-08 PROCEDURE — 97530 THERAPEUTIC ACTIVITIES: CPT

## 2022-12-08 NOTE — PROGRESS NOTES
OCCUPATIONAL THERAPY PLAN OF CARE     [facility]   Janette [address] [facility] [address]  Phone: [phone]    [] Certification     [] Recertification     [] Plan of Care    [x] Progress Note        Date: 2022    To:Referring Practitioner: Ralph Herrera PA-C          From: Alonso Grey OTR/GERI  Patient: Angela Wesley       : 1970  MRN: [id number]  Diagnosis:Diagnosis: L hand crush injury   Date of eval:     Visit Information:   Onset Date: 10/20/22  OT Insurance Information: Jessee Mcnamara  Total # of Visits Approved: 12  Total # of Visits to Date: 8  Certification Period Expiration Date: 01/10/22  Progress Note Due Date: 01/10/23  Progress Note Counter: 8    Last POC date: 11/10/2022   Reporting period: 11/                             Assessment:    Goals Current/Discharge status  Met   Long Term Goal 1: Patient  will be indep with all recommended HEP's, adaptive strategies, and adaptive techniques. [x] Met  [] Partially Met  [] Not Met   Long Term Goal 2: Patient  will increase LUE strength from current by 1/2 muscle grade  to increase performance with I/ADL's.  [] Met  [x] Partially Met  [] Not Met   Long Term Goal 3: Patient  will increase L  strength from current by 10-15 lbs to increase performance with I/ADL's. Upgraded goal [x] Met  [] Partially Met  [] Not Met   Long Term Goal 4: Patient  will increase L pinch strength from current by 2-3 lbs to increase performance with I/ADL's. [x] Met  [] Partially Met  [] Not Met   Long Term Goal 5: Patient will improve  LUE sensation and/or utilize compensatory techniques for safe completion of self-care as projected. [] Met  [x] Partially Met  [x] Not Met   Long Term Goal 6: Pt. will be able to lift and carry up to 50 Lbs using BUEs without pain.   [] Met  [] Partially Met  [x] Not Met        [] Met  [] Partially Met  [] Not Met        Left     Norm  A P MMT   Wrist Flexion 0-70 WFL NT 5/5   Extension  0-60 WFL NT 4+/5   Ulnar deviation 0-30 WFL NT 4+/5   Radial Deviation  0-20 WFL NT 4+/5         Average of  Three tries   LEFT  CURRENT      LEFT        Eval     Left     Norm       Joe lb. 93 71 91   PALMAR  Pinch  Lb. 18 18 17   LATERAL  Pinch  Lb. 29 25 18.5         EDEMA: Circumferential  Measurements cm              LEFT(CURRENT)                LEFT(initial)   Mid hand 27.1 28.6   Wrist (across styloid) 21.5 21.5   Metacarpal phalangeal 24.9 26     TREATMENT PLAN:  [] Evaluate & Treat [] Neuromuscular Re-education   [x] Re-evaluation [] Tissue (stress) Loading Program   [] Pain Management [] PROM/Stretching/AAROM/AROM   [] Edema Management [] Splinting   [] Wound Care/Scar Management [x] Desensitization   [] ADL Training [x] Strengthening/Graded Therapeutic Activity   [] Tendon Repair Program [] Coordination/Dexterity Training   [x] Instruction/Application of energy [] Manual Techniques       conservation, work simplification [] Instruction in HEP       joint protection, body mechanics [] Aquatic Therapy   [x] Modalities: [] Ultrasound   [] Infrared [] Electrical Stimulation [x] Fluidotherapy                          [] Hot/Cold Pack [] Paraffin    [x] Other:                             Frequency:  2  days per week  Duration:  4 visits     Rehab Potential: [x] Excellent [] Good  [] Fair  [] Poor      Patient Status:    [x] Continue/Initate plan of Care                    []  Discharge OT         []  Additional visits requested, please re-certify for additional visits        Electronically signed by: Lawerence Phalen, OTR/L 12/8/2022 5:47 PM    If you have any questions or concerns, please don't hesitate to call. Thank you for your referral.      I have reviewed this plan of care and certify a need for medically necessary rehabilitation services.     Physician Signature:__________________________________________________________    Date: 12/8/2022  Please sign and return

## 2022-12-08 NOTE — PROGRESS NOTES
[facility] OCCUPATIONAL THERAPY     Occupational Therapy: Daily Note   Patient: Jerrell Knenedy (17 y.o. male)   Date:   Plan of Care Certification Period:  01/10/22   :  1970  MRN: [id number]  CSN: [id number]   Insurance: Payor: [facility] / Plan: [address] / Product Type: *No Product type* /   Insurance ID:  - (Worker's Comp) Secondary Insurance (if applicable):    Referring Physician: Luna Brito MD     PCP: Nadja Wilson, APRN - CNP Visits to Date: Total # of Visits to Date: 8   Progress note:Progress Note Counter: 8  Visits Approved: 12    No Show:    Cancelled Appts:      Medical Diagnosis: Laceration without foreign body of left hand, initial encounter [S61.412A]  Crushing injury of left hand, initial encounter [S67.22XA]  Contusion of left hand, initial encounter [S60.222A] L hand crush injury        Therapy Time     Time in 0900   Time out 1000   Total treatment minutes 60   Total time code minutes  60 Minutes        OT Manual therapy 13 minutes for 1 unit(s), CPT 70926  OT Therapeutic activities 47 minutes for 3 unit(s), CPT 12911       SUBJECTIVE EXAMINATION     Patient's date of birth confirmed: Yes     Pain Level:   2/10    Patient Comments: \"It is more sore and stiff, not painful. I can do everything I need to do, but I can feel it sometimes. \"    Learning/Language barrier: no     HEP/Strategies/Orthosis Compliance: Patient verbally confirmed compliant with HEP's      Restrictions: none         OBJECTIVE EXAMINATION        Left     Norm  A P MMT   Wrist           Flexion 0-70 WFL NT 5/5   Extension  0-60 WFL NT 4+/5   Ulnar deviation 0-30 WFL NT 4+/5   Radial Deviation  0-20 WFL NT 4+/5        Average of  Three tries   LEFT  CURRENT      LEFT        Eval     Left     Norm       Joe lb. 93 71 91   PALMAR  Pinch  Lb. 18 18 17   LATERAL  Pinch  Lb. 29 25 18.5         EDEMA: Circumferential  Measurements cm              LEFT(CURRENT) LEFT(initial)   Mid hand 27.1 28.6   Wrist (across styloid) 21.5 21.5   Metacarpal phalangeal 24.9 26      TREATMENT     Focus of treatment was on the following:   assess for progress toward goals and strengthen L hand    MFR/Manual:   Soft tissue mobilization performed after cupping of entire scar ulnar side of hand. Joint mob completed between [facility] of L hand to decrease stiffness. Exercises/Activities for LUE:  Pinky ab/adduction against mod resistance hand Xtrainer, 10 reps  Gross grasp and finger ext. using mod resistance hand Xtrainer, 10 reps  Medium resistance green theraband:  pull downs w/ ulnar deviation of wrist x 10            Horizontal ab/adduction w/ ulnar dev x 10                    Straight arm raises w/ wrist ext x 10  Thumb to ring/pinky pinch w/ 2 lb. resistance clip x 10  Fluidotherapy at 115° F for 20 minutes while completing AROM Lwrist and squeezing of sponge. Patient Education/HEP:   Continue recommended HEP/activities. ASSESSMENT       Assessment: Pt tolerated treatment well. Making good progress toward goals.  increased 22 lbs., edema reduced and pinch strength improved. Goal 2,3,4 met. Pt. continues with numbness in pinky digit. Pt.  demo good follow through with HEP. Post Treatment Pain: Pt denies pain    Patient's Activity Tolerance: good                 GOALS         Long Term Goals  Time Frame for Long Term Goals : 6-12 visits  Long Term Goal 1: Patient  will be indep with all recommended HEP's, adaptive strategies, and adaptive techniques. Long Term Goal 2: Patient  will increase LUE strength from current by 1/2 muscle grade  to increase performance with I/ADL's. Long Term Goal 3: Patient  will increase L  strength from current by 10-15 lbs to increase performance with I/ADL's. Long Term Goal 4: Patient  will increase L pinch strength from current by 2-3 lbs to increase performance with I/ADL's.   Long Term Goal 5: Patient will improve  LUE sensation and/or utilize compensatory techniques for safe completion of self-care as projected. Long Term Goal 6: Pt. will be able to lift and carry up to 50 Lbs using BUEs without pain. TREATMENT PLAN   2 x week x 2 weeks for work simulation, strengthening L hand and reducing fascial restrictions of scar.            Electronically signed by JIM Lew  on 12/8/2022 at 10:13 AM

## 2022-12-12 ENCOUNTER — HOSPITAL ENCOUNTER (OUTPATIENT)
Dept: OCCUPATIONAL THERAPY | Age: 52
Setting detail: THERAPIES SERIES
Discharge: HOME | End: 2022-12-12
Payer: COMMERCIAL

## 2022-12-12 PROCEDURE — 97530 THERAPEUTIC ACTIVITIES: CPT

## 2022-12-12 PROCEDURE — 97110 THERAPEUTIC EXERCISES: CPT

## 2022-12-12 NOTE — PROGRESS NOTES
[facility] OCCUPATIONAL THERAPY     Occupational Therapy: Daily Note   Patient: Ana Luisa Sanders (86 y.o. male)   Date:   Plan of Care Certification Period:  01/10/22   :  1970  MRN: [id number]  CSN: [id number]   Insurance: Payor: Carmela Boldbet[employer] / Plan: [employer] / Product Type: *No Product type* /   Insurance ID:  - (Worker's Comp) Secondary Insurance (if applicable):    Referring Physician: Day Stanton MD     PCP: NICOL Zaldivar - CNP Visits to Date: Total # of Visits to Date: 9   Progress note:Progress Note Counter: 9  Visits Approved: 12    No Show:    Cancelled Appts:      Medical Diagnosis: Laceration without foreign body of left hand, initial encounter [S61.412A]  Crushing injury of left hand, initial encounter [S67.22XA]  Contusion of left hand, initial encounter [S60.222A] L hand crush injury        Therapy Time     Time in 0730   Time out 0829   Total treatment minutes 59   Total time code minutes  61 Minutes        OT Therapeutic activities 34 minutes for 2 unit(s), CPT 82151  OT Therapeutic exercises 25 minutes for 2 unit(s), CPT 61074       SUBJECTIVE EXAMINATION     Patient's date of birth confirmed: Yes     Pain Level:   Pt denies pain    Patient Comments:   \"I can tell my hand is getting better, I don't have any numbness in my ring finger anymore and my pinky is improving. \"    Learning/Language barrier: no     HEP/Strategies/Orthosis Compliance: Patient verbally confirmed compliant with HEP's     Restrictions: none      OBJECTIVE EXAMINATION     TREATMENT     Focus of treatment was on the following:   strengthening left  UE     MFR/Manual:   Brief scar mobilization performed with Dycem  padding and deep pressure across scar.     Exercises/Activities LUE:  max resistance blue power web:  fingertip  and pull x 15         Full grasp x 15  Forearm sup/pro w/ 1.5 lb. hammer and 5 sec hold at end range, x 15  15 lb. flexbar:  grasp and twist CW/CCW x 15               and flex x 15      Fluidotherapy at 115° F for 20 minutes while completing AROM gripping therasponge and performing wrist AROM tasks. Using thumb to ring and pinky digit pinch to place graded resistance clips, 1-6 lbs on vertical and horizontal bar, 28  clips x 2.  12\" to knuckle, knuckle to shoulder height and shoulder height to squat level lift of 40 Lb box w/ BUEs. Patient Education/HEP:   Continue recommended HEP/activities. ASSESSMENT       Assessment: Pt tolerated treatment well. Pt. reports fatigue in L hand, but no pain. Pt. progressing toward goals with improved sensory skills and strength. Post Treatment Pain: Pt denies pain    Patient's Activity Tolerance: good                 GOALS         Long Term Goals  Time Frame for Long Term Goals : 6-12 visits  Long Term Goal 1: Patient  will be indep with all recommended HEP's, adaptive strategies, and adaptive techniques. Long Term Goal 2: Patient  will increase LUE strength to 5/5 muscle grade  to increase performance with I/ADL's. Long Term Goal 3: Patient  will increase L  strength from current to 100-105  lbs to increase performance with I/ADL's. Long Term Goal 4: Patient  will increase L pinch strength from current by 2-3 lbs to increase performance with I/ADL's. Long Term Goal 5: Patient will improve  LUE sensation and/or utilize compensatory techniques for safe completion of self-care as projected. Long Term Goal 6: Pt. will be able to lift and carry up to 50 Lbs using BUEs without pain. TREATMENT PLAN   Updated goals and continue strengthening with work simulation tasks.            Electronically signed by JIM Ramirez  on 12/12/2022 at 8:31 AM

## 2022-12-15 ENCOUNTER — HOSPITAL ENCOUNTER (OUTPATIENT)
Dept: OCCUPATIONAL THERAPY | Age: 52
Setting detail: THERAPIES SERIES
Discharge: HOME | End: 2022-12-15
Payer: COMMERCIAL

## 2022-12-15 PROCEDURE — 97140 MANUAL THERAPY 1/> REGIONS: CPT

## 2022-12-15 PROCEDURE — 97530 THERAPEUTIC ACTIVITIES: CPT

## 2022-12-15 NOTE — PROGRESS NOTES
[facility] OCCUPATIONAL THERAPY    Occupational Therapy: Daily Note   Patient: Urbano Price (31 y.o. male)   Date:   Plan of Care Certification Period:  01/10/22   :  1970  MRN: [id number]  CSN: [id number]   Insurance: Payor: [employer] / Plan: [employer] / Product Type: *No Product type* /   Insurance ID:  - (Worker's Comp) Secondary Insurance (if applicable):    Referring Physician: Tiffany Stephens MD     PCP: NICOL Madrigal CNP Visits to Date: Total # of Visits to Date: 10   Progress note:Progress Note Counter: 10  Visits Approved: 12    No Show:    Cancelled Appts:      Medical Diagnosis: Laceration without foreign body of left hand, initial encounter [S61.412A]  Crushing injury of left hand, initial encounter [S67.22XA]  Contusion of left hand, initial encounter [S60.222A] L hand crush injury        Therapy Time     Time in 0900   Time out 0959   Total treatment minutes 59   Total time code minutes  59 Minutes        OT Manual therapy 9 minutes for 1 unit(s), CPT 39886  OT Therapeutic activities 50 minutes for 3 unit(s), CPT 95257       SUBJECTIVE EXAMINATION     Patient's date of birth confirmed: Yes     Pain Level: Pt denies pain     Patient Comments:   \"I am still getting some numbness in my pinky\"      Learning/Language barrier: no    HEP/Strategies/Orthosis Compliance: Patient verbally confirmed compliant with HEP's       Restrictions: None            OBJECTIVE EXAMINATION         TREATMENT     Focus of treatment was on the following:   strengthening  left       MFR/Manual:   Scar mobilization completed with dycem and soft tissue mobilization around ulnar side of the L hand. Exercises/Activities:  Pt completed supination/pronation with a 1.5 lb hammer x15 reps. Pt then with green resistive theraputty completing pinch between thumb and 4th/5th digits. Pt also rolling out theraputty with palm and fingers.  Pt then completes flexion with pronated/supinated grasp using green flexbar x10 reps. Pt also completes x10 reps of wrist flexion/extension with green flexbar. 4th/5th digit pinch to thumb using 4# resistive clip. Pt with good tolerance. Fluidotherapy at 115° F for 20 minutes while completing AROM gripping resistive ball and performing wrist AROM    Patient Education/HEP:   Continue recommended HEP/activities. ASSESSMENT       Assessment: Pt tolerated treatment well. Pt with no reports of pain with any strengthening exercises. Pt reports some numbness in L 5th digit. Post Treatment Pain: Pt denies pain    Patient's Activity Tolerance: Good                  GOALS         Long Term Goals  Time Frame for Long Term Goals : 6-12 visits  Long Term Goal 1: Patient  will be indep with all recommended HEP's, adaptive strategies, and adaptive techniques. Long Term Goal 2: Patient  will increase LUE strength to 5/5 muscle grade  to increase performance with I/ADL's. Long Term Goal 3: Patient  will increase L  strength from current to 100-105  lbs to increase performance with I/ADL's. Long Term Goal 4: Patient  will increase L pinch strength from current by 2-3 lbs to increase performance with I/ADL's. Long Term Goal 5: Patient will improve  LUE sensation and/or utilize compensatory techniques for safe completion of self-care as projected. Long Term Goal 6: Pt. will be able to lift and carry up to 50 Lbs using BUEs without pain.          TREATMENT PLAN   Continue POC           Electronically signed by Chelo Herrera OT  on 12/15/2022 at 1:01 PM

## 2022-12-19 ENCOUNTER — HOSPITAL ENCOUNTER (OUTPATIENT)
Dept: OCCUPATIONAL THERAPY | Age: 52
Setting detail: THERAPIES SERIES
Discharge: HOME | End: 2022-12-19
Payer: COMMERCIAL

## 2022-12-19 PROCEDURE — 97530 THERAPEUTIC ACTIVITIES: CPT

## 2022-12-19 PROCEDURE — 97140 MANUAL THERAPY 1/> REGIONS: CPT

## 2022-12-19 NOTE — PROGRESS NOTES
[facility] OCCUPATIONAL THERAPY     Occupational Therapy: Daily Note   Patient: Latanya Fountain (89 y.o. male)   Date:   Plan of Care Certification Period:  01/10/22   :  1970  MRN: [id number]  CSN: [id number]   Insurance: Payor: [employer] / Plan: [employer] / Product Type: *No Product type* /   Insurance ID:  - (Worker's Comp) Secondary Insurance (if applicable):    Referring Physician: Alfreda Tolliver MD     PCP: NICOL Hennessy - CNP Visits to Date: Total # of Visits to Date: 11   Progress note:Progress Note Counter: 11  Visits Approved: 12    No Show:    Cancelled Appts:      Medical Diagnosis: Laceration without foreign body of left hand, initial encounter [S61.412A]  Crushing injury of left hand, initial encounter [S67.22XA]  Contusion of left hand, initial encounter [S60.222A] L hand crush injury        Therapy Time     Time in 0730   Time out 0830   Total treatment minutes 60   Total time code minutes  60 Minutes        OT Manual therapy 8 minutes for 1 unit(s), CPT 36370  OT Therapeutic activities 52 minutes for 3 unit(s), CPT 94976       SUBJECTIVE EXAMINATION     Patient's date of birth confirmed: Yes     Pain Level:   Pt denies pain    Patient Comments:   \"I have more feeling in my pinky digit, but it tingles sometimes. \"    Learning/Language barrier: no     HEP/Strategies/Orthosis Compliance: Patient verbally confirmed compliant with HEP's      Restrictions: none      OBJECTIVE EXAMINATION     TREATMENT     Focus of treatment was on the following:   strengthening ulnar side of L hand and alleviating fascial restrictions. Fluidotherapy LUE while gripping foam ball and performing large wrist motions. Cupping over ulnar side of hand and along scar for release of scar tissue. Instructed pt. in using Dycem non-slip  for continued scar mobilization. Strengthening activities w/ LUE:  40 LB.  resistance hand gripper, 2 x 15 reps  Individual digit flexion w/ 7 lb. Digiflex, 15 reps each digit  5th digit abduction against thick/wide rubberband resistance, 10 reps  Tip to tip prehension, thumb to pinky to  small beads, 15 reps x 2. Instructed in and performed strengthening HEP w/ wide rubberband. Patient Education/HEP:    scar mob with Dycem and strengthening ulnar digits with wide rubberband HEP. ASSESSMENT       Assessment:  Pt. progressing well with LUE strength and mobility. Pt. continues with scar management techiques. Pt. demo good understanding of upgraded HEP. Pt. continues with intermittent tingling in L little finger. Post Treatment Pain: Pt denies pain    Patient's Activity Tolerance: good                 GOALS         Long Term Goals  Time Frame for Long Term Goals : 6-12 visits  Long Term Goal 1: Patient  will be indep with all recommended HEP's, adaptive strategies, and adaptive techniques. Long Term Goal 2: Patient  will increase LUE strength to 5/5 muscle grade  to increase performance with I/ADL's. Long Term Goal 3: Patient  will increase L  strength from current to 100-105  lbs to increase performance with I/ADL's. Long Term Goal 4: Patient  will increase L pinch strength from current by 2-3 lbs to increase performance with I/ADL's. Long Term Goal 5: Patient will improve  LUE sensation and/or utilize compensatory techniques for safe completion of self-care as projected. Long Term Goal 6: Pt. will be able to lift and carry up to 50 Lbs using BUEs without pain. TREATMENT PLAN   See pt  1 more authorized visit for upgraded HEP, strengthening and sensory stim to LUE.            Electronically signed by JIM Quintero  on 12/19/2022 at 9:19 AM

## 2022-12-22 ENCOUNTER — HOSPITAL ENCOUNTER (OUTPATIENT)
Dept: OCCUPATIONAL THERAPY | Age: 52
Setting detail: THERAPIES SERIES
Discharge: HOME | End: 2022-12-22
Payer: COMMERCIAL

## 2022-12-22 PROCEDURE — 97530 THERAPEUTIC ACTIVITIES: CPT

## 2022-12-22 NOTE — PROGRESS NOTES
[facility] OCCUPATIONAL THERAPY     Occupational Therapy: Daily Note   Patient: Michel Toussaint (54 y.o. male)   Date:   Plan of Care Certification Period:  01/10/22   :  1970  MRN: [id number]  CSN: [id number]   Insurance: Payor: Althea Bravo / [facility] / Product Type: *No Product type* /   Insurance ID:  - (Worker's Comp) Secondary Insurance (if applicable):    Referring Physician: Jamie Pollard MD     PCP: NICOL Bland CNP Visits to Date: Total # of Visits to Date: 12   Progress note:Progress Note Counter: 12  Visits Approved: 12    No Show:    Cancelled Appts:      Medical Diagnosis: Laceration without foreign body of left hand, initial encounter [S61.412A]  Crushing injury of left hand, initial encounter [S67.22XA]  Contusion of left hand, initial encounter [S60.222A] L hand crush injury        Therapy Time     Time in 0850   Time out 0950   Total treatment minutes 60   Total time code minutes  60 Minutes        OT Therapeutic activities 60 minutes for 4 unit(s), CPT 93754       SUBJECTIVE EXAMINATION     Patient's date of birth confirmed: Yes     Pain Level:   Pt denies pain    Patient Comments:   \"I still have numbness in my little finger, but it doesn't stop me from doing anything. \"     Learning/Language barrier: no     HEP/Strategies/Orthosis Compliance: Patient verbally confirmed compliant with HEP's     Restrictions: none      OBJECTIVE EXAMINATION        Left     Norm  A P MMT   Wrist           Flexion 0-70 WFL NT 5/5   Extension  0-60 WFL NT 5/5   Ulnar deviation 0-30 WFL NT 5/5   Radial Deviation  0-20 WFL NT 5/5         Average of  Three tries   LEFT  CURRENT      LEFT        Eval     Left     Norm       Joe lb.  100 71 91   PALMAR  Pinch  Lb. 21 18 17   LATERAL  Pinch  Lb. 30 25 18.5         EDEMA: Circumferential  Measurements cm              LEFT(CURRENT)                LEFT(initial)   Mid hand 26.7 28.6   Wrist (across styloid) 21.5 21.5   Metacarpal phalangeal 25.1 26         TREATMENT     Focus of treatment was on the following:   assess for progress toward goals     Exercises/Activities:  Measured LUE as above. 50 lb. box:  12\" to knuckle lift x 2         12\" to waist x 1          waist to chest lift x 2         Lift and carry 25' x 2  3 pt. prehension w/ thumb and D4 & 5 placing graded clips on vertical pole, 35 clips     Occluded vision, manipulating nuts onto bolts x 5  Grasp and transfer 5 lb. metal blocks, 15 blocks x 2 for strengthening hand  Repetitive wrist flex/ext rolling up 2 lb. onto dowel abraham w/ BUES, 10 reps    Patient Education/HEP:   Instructed pt. in passive flexion of little finger MP joint and perform digit blocking ex. to relieve tightness of that joint. Pt. demo good understanding of HEP. Continue previously recommended HEP/activities. ASSESSMENT       Assessment:  Pt. has met goals set on eval, , pinch and general strength WNL for age and gender. Pt. is able to safely lift and carry 50 lbs. without difficulty. Pt. 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 Pt. continues to have numbness in pinky digit, but utilizes compensatory techniques for safety. Pt. demo good follow through with HEP. Post Treatment Pain: Pt denies pain    Patient's Activity Tolerance: good                 GOALS         Long Term Goals  Time Frame for Long Term Goals : 6-12 visits  Long Term Goal 1: Patient  will be indep with all recommended HEP's, adaptive strategies, and adaptive techniques. Long Term Goal 2: Patient  will increase LUE strength to 5/5 muscle grade  to increase performance with I/ADL's. Long Term Goal 3: Patient  will increase L  strength from current to 100-105  lbs to increase performance with I/ADL's. Long Term Goal 4: Patient  will increase L pinch strength from current by 2-3 lbs to increase performance with I/ADL's.   Long Term Goal 5: Patient will improve  LUE sensation and/or utilize compensatory techniques for safe completion of self-care as projected. Long Term Goal 6: Pt. will be able to lift and carry up to 50 Lbs using BUEs without pain. TREATMENT PLAN   D/C from OP OT. Goals met. D/C sent to physician.            Electronically signed by JIM Nassar  on 12/22/2022 at 9:56 AM

## 2022-12-22 NOTE — PROGRESS NOTES
OCCUPATIONAL THERAPY DISCHARGE SUMMARY     [address] [place], [place] [address]e [place] [facility] [address] [facility]  Ph: [phone]   Fax: [phone]      Date: 2022    To: Laura Crawley MD From: Mary Carmen Whitmore OTR/L   Patient: Kaia Angelo : 1970   MRN: [id number] Diagnosis: Laceration without foreign body of left hand, initial encounter [S61.412A]  Crushing injury of left hand, initial encounter [I63.25NO]  Contusion of left hand, initial encounter [S60.222A] Diagnosis: L hand crush injury     Date of eval: 11/10/2022    Visit Information:   Onset Date: 10/20/22  OT Insurance Information: [address]  Total # of Visits Approved: 12  Total # of Visits to Date: 12  Certification Period Expiration Date: 01/10/22  Progress Note Due Date: 01/10/23  Progress Note Counter: 12                              Assessment:        Long Term Goals Current/Discharge status  Met   Long Term Goal 1: Patient  will be indep with all recommended HEP's, adaptive strategies, and adaptive techniques. [x] Met  [] Partially Met  [] Not Met   Long Term Goal 2: Patient  will increase LUE strength to 5/5 muscle grade  to increase performance with I/ADL's. See chart below [x] Met  [] Partially Met  [] Not Met   Long Term Goal 3: Patient  will increase L  strength from current to 100-105  lbs to increase performance with I/ADL's. See chart below [x] Met  [] Partially Met  [] Not Met   Long Term Goal 4: Patient  will increase L pinch strength from current by 2-3 lbs to increase performance with I/ADL's. See chart below [x] Met  [] Partially Met  [] Not Met   Long Term Goal 5: Patient will improve  LUE sensation and/or utilize compensatory techniques for safe completion of self-care as projected. Pt. continues with mild numbness pinky digit, however demonstrates safe compensatory techniques during lifting.  [x] Met  [x] Partially Met  [] Not Met   Long Term Goal 6: Pt. will be able to lift and carry up to 50 Lbs using BUEs without pain. Able to push/pull, lift and carry 50 lbs. [x] Met  [] Partially Met  [] Not Met        Left     Norm  A P MMT   Wrist           Flexion 0-70 WFL NT 5/5   Extension  0-60 WFL NT 5/5   Ulnar deviation 0-30 WFL NT 5/5   Radial Deviation  0-20 WFL NT 5/5         Average of  Three tries   LEFT  CURRENT      LEFT        Eval     Left     Norm       Joe lb. 100 71 91   PALMAR  Pinch  Lb. 21 18 17   LATERAL  Pinch  Lb. 30 25 18.5         EDEMA: Circumferential  Measurements cm              LEFT(CURRENT)                LEFT(initial)   Mid hand 26.7 28.6   Wrist (across styloid) 21.5 21.5   Metacarpal phalangeal 25.1 26        Functional assessment used: Upper Extremity Functional Index  Score on eval: 100%  Score at d/c: 100%    Plan: D/C from OT    Thank you for referral of this patient. Electronically signed by:   JIM Owen 12/22/2022 10:07 AM

## 2022-12-27 ENCOUNTER — APPOINTMENT (OUTPATIENT)
Dept: OCCUPATIONAL THERAPY | Age: 52
End: 2022-12-27
Payer: COMMERCIAL

## 2022-12-28 ENCOUNTER — APPOINTMENT (OUTPATIENT)
Dept: OCCUPATIONAL THERAPY | Age: 52
End: 2022-12-28
Payer: COMMERCIAL

## 2022-12-29 ENCOUNTER — APPOINTMENT (OUTPATIENT)
Dept: OCCUPATIONAL THERAPY | Age: 52
End: 2022-12-29
Payer: COMMERCIAL

## 2023-01-02 DIAGNOSIS — I10 ESSENTIAL HYPERTENSION: ICD-10-CM

## 2023-01-03 RX ORDER — LISINOPRIL 20 MG/1
20 TABLET ORAL DAILY
Qty: 90 TABLET | Refills: 1 | Status: SHIPPED | OUTPATIENT
Start: 2023-01-03

## 2023-05-09 ENCOUNTER — HOSPITAL ENCOUNTER (OUTPATIENT)
Age: 53
Discharge: HOME | End: 2023-05-09
Payer: COMMERCIAL

## 2023-05-09 ENCOUNTER — OFFICE VISIT (OUTPATIENT)
Dept: FAMILY MEDICINE CLINIC | Age: 53
End: 2023-05-09
Payer: COMMERCIAL

## 2023-05-09 VITALS
SYSTOLIC BLOOD PRESSURE: 110 MMHG | OXYGEN SATURATION: 96 % | HEART RATE: 74 BPM | WEIGHT: 315 LBS | DIASTOLIC BLOOD PRESSURE: 64 MMHG | TEMPERATURE: 97.4 F | HEIGHT: 75 IN | BODY MASS INDEX: 39.17 KG/M2

## 2023-05-09 DIAGNOSIS — E11.9 TYPE 2 DIABETES MELLITUS WITHOUT COMPLICATION, WITHOUT LONG-TERM CURRENT USE OF INSULIN (HCC): Primary | ICD-10-CM

## 2023-05-09 DIAGNOSIS — E78.2 MIXED HYPERLIPIDEMIA: ICD-10-CM

## 2023-05-09 DIAGNOSIS — I10 ESSENTIAL HYPERTENSION: ICD-10-CM

## 2023-05-09 DIAGNOSIS — E11.9 TYPE 2 DIABETES MELLITUS WITHOUT COMPLICATION, WITHOUT LONG-TERM CURRENT USE OF INSULIN (HCC): ICD-10-CM

## 2023-05-09 LAB
CREATININE URINE POCT: NORMAL
EST. AVERAGE GLUCOSE BLD GHB EST-MCNC: 128 MG/DL
HBA1C MFR BLD: 6.1 % (ref 4–6)
MICROALBUMIN/CREAT 24H UR: NORMAL MG/G{CREAT}
MICROALBUMIN/CREAT UR-RTO: NORMAL

## 2023-05-09 PROCEDURE — 3017F COLORECTAL CA SCREEN DOC REV: CPT | Performed by: NURSE PRACTITIONER

## 2023-05-09 PROCEDURE — 3078F DIAST BP <80 MM HG: CPT | Performed by: NURSE PRACTITIONER

## 2023-05-09 PROCEDURE — 3046F HEMOGLOBIN A1C LEVEL >9.0%: CPT | Performed by: NURSE PRACTITIONER

## 2023-05-09 PROCEDURE — 1036F TOBACCO NON-USER: CPT | Performed by: NURSE PRACTITIONER

## 2023-05-09 PROCEDURE — 83036 HEMOGLOBIN GLYCOSYLATED A1C: CPT

## 2023-05-09 PROCEDURE — 2022F DILAT RTA XM EVC RTNOPTHY: CPT | Performed by: NURSE PRACTITIONER

## 2023-05-09 PROCEDURE — G8427 DOCREV CUR MEDS BY ELIG CLIN: HCPCS | Performed by: NURSE PRACTITIONER

## 2023-05-09 PROCEDURE — G8417 CALC BMI ABV UP PARAM F/U: HCPCS | Performed by: NURSE PRACTITIONER

## 2023-05-09 PROCEDURE — 3074F SYST BP LT 130 MM HG: CPT | Performed by: NURSE PRACTITIONER

## 2023-05-09 PROCEDURE — 36415 COLL VENOUS BLD VENIPUNCTURE: CPT

## 2023-05-09 PROCEDURE — 99214 OFFICE O/P EST MOD 30 MIN: CPT | Performed by: NURSE PRACTITIONER

## 2023-05-09 PROCEDURE — 82044 UR ALBUMIN SEMIQUANTITATIVE: CPT | Performed by: NURSE PRACTITIONER

## 2023-05-09 RX ORDER — ATORVASTATIN CALCIUM 80 MG/1
80 TABLET, FILM COATED ORAL DAILY
Qty: 90 TABLET | Refills: 1 | Status: SHIPPED | OUTPATIENT
Start: 2023-05-09

## 2023-05-09 RX ORDER — LISINOPRIL 20 MG/1
20 TABLET ORAL DAILY
Qty: 90 TABLET | Refills: 1 | Status: CANCELLED | OUTPATIENT
Start: 2023-05-09

## 2023-05-09 SDOH — ECONOMIC STABILITY: INCOME INSECURITY: HOW HARD IS IT FOR YOU TO PAY FOR THE VERY BASICS LIKE FOOD, HOUSING, MEDICAL CARE, AND HEATING?: NOT HARD AT ALL

## 2023-05-09 SDOH — ECONOMIC STABILITY: FOOD INSECURITY: WITHIN THE PAST 12 MONTHS, YOU WORRIED THAT YOUR FOOD WOULD RUN OUT BEFORE YOU GOT MONEY TO BUY MORE.: NEVER TRUE

## 2023-05-09 SDOH — ECONOMIC STABILITY: FOOD INSECURITY: WITHIN THE PAST 12 MONTHS, THE FOOD YOU BOUGHT JUST DIDN'T LAST AND YOU DIDN'T HAVE MONEY TO GET MORE.: NEVER TRUE

## 2023-05-09 SDOH — ECONOMIC STABILITY: HOUSING INSECURITY
IN THE LAST 12 MONTHS, WAS THERE A TIME WHEN YOU DID NOT HAVE A STEADY PLACE TO SLEEP OR SLEPT IN A SHELTER (INCLUDING NOW)?: NO

## 2023-05-09 ASSESSMENT — PATIENT HEALTH QUESTIONNAIRE - PHQ9
2. FEELING DOWN, DEPRESSED OR HOPELESS: 0
1. LITTLE INTEREST OR PLEASURE IN DOING THINGS: 0
SUM OF ALL RESPONSES TO PHQ QUESTIONS 1-9: 0
SUM OF ALL RESPONSES TO PHQ9 QUESTIONS 1 & 2: 0
SUM OF ALL RESPONSES TO PHQ QUESTIONS 1-9: 0

## 2023-05-09 ASSESSMENT — ENCOUNTER SYMPTOMS
DIARRHEA: 0
SHORTNESS OF BREATH: 0
VOMITING: 0
BLURRED VISION: 0
NAUSEA: 0
COUGH: 0

## 2023-05-09 NOTE — PROGRESS NOTES
Refills   Requested Prescriptions     Signed Prescriptions Disp Refills    atorvastatin (LIPITOR) 80 MG tablet 90 tablet 1     Sig: Take 1 tablet by mouth daily

## 2023-06-26 DIAGNOSIS — I10 ESSENTIAL HYPERTENSION: ICD-10-CM

## 2023-06-27 RX ORDER — LISINOPRIL 20 MG/1
20 TABLET ORAL DAILY
Qty: 90 TABLET | Refills: 1 | Status: SHIPPED | OUTPATIENT
Start: 2023-06-27

## 2023-09-02 DIAGNOSIS — I10 ESSENTIAL HYPERTENSION: ICD-10-CM

## 2023-09-05 RX ORDER — LISINOPRIL 20 MG/1
20 TABLET ORAL DAILY
Qty: 90 TABLET | Refills: 1 | Status: SHIPPED | OUTPATIENT
Start: 2023-09-05

## 2023-09-05 RX ORDER — ATORVASTATIN CALCIUM 80 MG/1
80 TABLET, FILM COATED ORAL DAILY
Qty: 90 TABLET | Refills: 1 | Status: SHIPPED | OUTPATIENT
Start: 2023-09-05

## 2023-09-05 NOTE — TELEPHONE ENCOUNTER
Last OV 5/9/23 for DM, HTN, HLD  Requesting refills on atorvastatin and lisinopril thru [facility] script  Next OV 11/7/23

## 2023-11-07 ENCOUNTER — OFFICE VISIT (OUTPATIENT)
Dept: FAMILY MEDICINE CLINIC | Age: 53
End: 2023-11-07
Payer: COMMERCIAL

## 2023-11-07 VITALS
BODY MASS INDEX: 46.75 KG/M2 | OXYGEN SATURATION: 96 % | DIASTOLIC BLOOD PRESSURE: 74 MMHG | SYSTOLIC BLOOD PRESSURE: 108 MMHG | TEMPERATURE: 97.6 F | HEART RATE: 68 BPM | WEIGHT: 315 LBS

## 2023-11-07 DIAGNOSIS — E78.2 MIXED HYPERLIPIDEMIA: ICD-10-CM

## 2023-11-07 DIAGNOSIS — I10 ESSENTIAL HYPERTENSION: ICD-10-CM

## 2023-11-07 DIAGNOSIS — E11.9 TYPE 2 DIABETES MELLITUS WITHOUT COMPLICATION, WITHOUT LONG-TERM CURRENT USE OF INSULIN (HCC): Primary | ICD-10-CM

## 2023-11-07 LAB — HBA1C MFR BLD: 6.6 %

## 2023-11-07 PROCEDURE — G8427 DOCREV CUR MEDS BY ELIG CLIN: HCPCS | Performed by: NURSE PRACTITIONER

## 2023-11-07 PROCEDURE — G8484 FLU IMMUNIZE NO ADMIN: HCPCS | Performed by: NURSE PRACTITIONER

## 2023-11-07 PROCEDURE — 2022F DILAT RTA XM EVC RTNOPTHY: CPT | Performed by: NURSE PRACTITIONER

## 2023-11-07 PROCEDURE — 3017F COLORECTAL CA SCREEN DOC REV: CPT | Performed by: NURSE PRACTITIONER

## 2023-11-07 PROCEDURE — 3074F SYST BP LT 130 MM HG: CPT | Performed by: NURSE PRACTITIONER

## 2023-11-07 PROCEDURE — 3078F DIAST BP <80 MM HG: CPT | Performed by: NURSE PRACTITIONER

## 2023-11-07 PROCEDURE — 99214 OFFICE O/P EST MOD 30 MIN: CPT | Performed by: NURSE PRACTITIONER

## 2023-11-07 PROCEDURE — G8417 CALC BMI ABV UP PARAM F/U: HCPCS | Performed by: NURSE PRACTITIONER

## 2023-11-07 PROCEDURE — 1036F TOBACCO NON-USER: CPT | Performed by: NURSE PRACTITIONER

## 2023-11-07 PROCEDURE — 3044F HG A1C LEVEL LT 7.0%: CPT | Performed by: NURSE PRACTITIONER

## 2023-11-07 PROCEDURE — 83036 HEMOGLOBIN GLYCOSYLATED A1C: CPT | Performed by: NURSE PRACTITIONER

## 2023-11-07 ASSESSMENT — ENCOUNTER SYMPTOMS
NAUSEA: 0
VISUAL CHANGE: 0
SHORTNESS OF BREATH: 0
DIARRHEA: 0
VOMITING: 0
COUGH: 0

## 2023-11-07 NOTE — PROGRESS NOTES
HPI Notes    Name: Susana Coyle  : 1970         Chief Complaint:     Chief Complaint   Patient presents with    Diabetes Mellitus     6 month follow up. Last A1C was 6.1 in     Hypertension    Hyperlipidemia       History of Present Illness:        Hypertension  This is a chronic problem. The current episode started more than 1 year ago. The problem has been gradually improving since onset. The problem is controlled. Associated symptoms include peripheral edema. Pertinent negatives include no chest pain, headaches, palpitations or shortness of breath. Risk factors for coronary artery disease include diabetes mellitus, dyslipidemia, obesity and male gender. Past treatments include ACE inhibitors. The current treatment provides moderate improvement. Compliance problems include exercise. Hyperlipidemia  This is a chronic problem. The problem is controlled. Recent lipid tests were reviewed and are normal. Exacerbating diseases include diabetes and obesity. Factors aggravating his hyperlipidemia include fatty foods. Pertinent negatives include no chest pain or shortness of breath. Current antihyperlipidemic treatment includes statins. The current treatment provides moderate improvement of lipids. Compliance problems include adherence to exercise. Risk factors for coronary artery disease include diabetes mellitus. Diabetes  He presents for his follow-up diabetic visit. He has type 2 diabetes mellitus. His disease course has been stable. There are no hypoglycemic associated symptoms. Pertinent negatives for hypoglycemia include no dizziness, headaches or seizures. Pertinent negatives for diabetes include no chest pain, no fatigue, no visual change and no weight loss. Symptoms are stable. Risk factors for coronary artery disease include diabetes mellitus, dyslipidemia, male sex, obesity and hypertension. Current diabetic treatment includes diet. He is compliant with treatment all of the time.  His

## 2023-11-07 NOTE — PATIENT INSTRUCTIONS
SURVEY:    You may be receiving a survey from [employer] regarding your visit today. Please complete the survey to enable us to provide the highest quality of care to you and your family. If you cannot score us a very good (5 Stars) on any question, please call the office to discuss how we could have made your experience a very good one. Thank you.     Clinical Care Team: NICOL Chaparro-INES Hernandez LPN    Clerical Team: 1 Konstantin Yost

## 2023-12-31 ENCOUNTER — APPOINTMENT (OUTPATIENT)
Dept: GENERAL RADIOLOGY | Age: 53
DRG: 603 | End: 2023-12-31
Payer: COMMERCIAL

## 2023-12-31 ENCOUNTER — HOSPITAL ENCOUNTER (EMERGENCY)
Age: 53
Discharge: HOME | DRG: 603 | End: 2023-12-31
Attending: FAMILY MEDICINE
Payer: COMMERCIAL

## 2023-12-31 ENCOUNTER — APPOINTMENT (OUTPATIENT)
Dept: CT IMAGING | Age: 53
DRG: 603 | End: 2023-12-31
Payer: COMMERCIAL

## 2023-12-31 VITALS
BODY MASS INDEX: 40.43 KG/M2 | RESPIRATION RATE: 20 BRPM | HEIGHT: 74 IN | DIASTOLIC BLOOD PRESSURE: 100 MMHG | SYSTOLIC BLOOD PRESSURE: 138 MMHG | OXYGEN SATURATION: 93 % | HEART RATE: 103 BPM | TEMPERATURE: 98.3 F | WEIGHT: 315 LBS

## 2023-12-31 DIAGNOSIS — Z86.39 HISTORY OF DIABETES MELLITUS: ICD-10-CM

## 2023-12-31 DIAGNOSIS — B34.9 NONSPECIFIC SYNDROME SUGGESTIVE OF VIRAL ILLNESS: Primary | ICD-10-CM

## 2023-12-31 DIAGNOSIS — L03.116 CELLULITIS OF LEFT FOOT: ICD-10-CM

## 2023-12-31 DIAGNOSIS — Z20.822 LAB TEST NEGATIVE FOR COVID-19 VIRUS: ICD-10-CM

## 2023-12-31 DIAGNOSIS — R93.89 ABNORMAL FINDING ON CHEST XRAY: ICD-10-CM

## 2023-12-31 DIAGNOSIS — R06.02 SHORTNESS OF BREATH: ICD-10-CM

## 2023-12-31 LAB
ABSOLUTE BANDS #: 0.66 K/UL (ref 0–1)
ANION GAP SERPL CALCULATED.3IONS-SCNC: 10 MMOL/L (ref 9–17)
BANDS: 5 % (ref 0–10)
BASOPHILS # BLD: ABNORMAL K/UL (ref 0–0.2)
BASOPHILS NFR BLD: ABNORMAL % (ref 0–2)
BNP SERPL-MCNC: 40 PG/ML
BUN SERPL-MCNC: 17 MG/DL (ref 6–20)
BUN/CREAT SERPL: 15 (ref 9–20)
CALCIUM SERPL-MCNC: 9.8 MG/DL (ref 8.6–10.4)
CHLORIDE SERPL-SCNC: 96 MMOL/L (ref 98–107)
CO2 SERPL-SCNC: 25 MMOL/L (ref 20–31)
CREAT SERPL-MCNC: 1.1 MG/DL (ref 0.7–1.2)
EOSINOPHIL # BLD: ABNORMAL K/UL (ref 0–0.4)
EOSINOPHILS RELATIVE PERCENT: ABNORMAL % (ref 0–5)
ERYTHROCYTE [DISTWIDTH] IN BLOOD BY AUTOMATED COUNT: 12.4 % (ref 12.1–15.2)
FLUAV AG SPEC QL: NEGATIVE
FLUBV AG SPEC QL: NEGATIVE
GFR SERPL CREATININE-BSD FRML MDRD: >60 ML/MIN/1.73M2
GLUCOSE SERPL-MCNC: 173 MG/DL (ref 70–99)
HCT VFR BLD AUTO: 44.2 % (ref 41–53)
HGB BLD-MCNC: 15.1 G/DL (ref 13.5–17.5)
IMM GRANULOCYTES # BLD AUTO: ABNORMAL K/UL (ref 0–0.3)
IMM GRANULOCYTES NFR BLD: ABNORMAL %
LYMPHOCYTES NFR BLD: 1.19 K/UL (ref 1–4.8)
LYMPHOCYTES RELATIVE PERCENT: 9 % (ref 13–44)
MCH RBC QN AUTO: 29.4 PG (ref 26–34)
MCHC RBC AUTO-ENTMCNC: 34.2 G/DL (ref 31–37)
MCV RBC AUTO: 86 FL (ref 80–100)
MONOCYTES NFR BLD: 0.79 K/UL (ref 0–1)
MONOCYTES NFR BLD: 6 % (ref 5–9)
MORPHOLOGY: ABNORMAL
NEUTROPHILS NFR BLD: 80 % (ref 39–75)
NEUTS SEG NFR BLD: 10.56 K/UL (ref 2.1–6.5)
PLATELET # BLD AUTO: 134 K/UL (ref 140–450)
PMV BLD AUTO: 8.4 FL (ref 6–12)
POTASSIUM SERPL-SCNC: 4.1 MMOL/L (ref 3.7–5.3)
RBC # BLD AUTO: 5.14 M/UL (ref 4.5–5.9)
SARS-COV-2 RDRP RESP QL NAA+PROBE: NOT DETECTED
SODIUM SERPL-SCNC: 131 MMOL/L (ref 135–144)
SPECIMEN DESCRIPTION: NORMAL
TROPONIN I SERPL HS-MCNC: 10 NG/L (ref 0–22)
WBC OTHER # BLD: 13.2 K/UL (ref 3.5–11)

## 2023-12-31 PROCEDURE — 87635 SARS-COV-2 COVID-19 AMP PRB: CPT

## 2023-12-31 PROCEDURE — 71275 CT ANGIOGRAPHY CHEST: CPT

## 2023-12-31 PROCEDURE — 36415 COLL VENOUS BLD VENIPUNCTURE: CPT

## 2023-12-31 PROCEDURE — 93005 ELECTROCARDIOGRAM TRACING: CPT | Performed by: FAMILY MEDICINE

## 2023-12-31 PROCEDURE — 87804 INFLUENZA ASSAY W/OPTIC: CPT

## 2023-12-31 PROCEDURE — 99285 EMERGENCY DEPT VISIT HI MDM: CPT

## 2023-12-31 PROCEDURE — 83880 ASSAY OF NATRIURETIC PEPTIDE: CPT

## 2023-12-31 PROCEDURE — 6360000004 HC RX CONTRAST MEDICATION: Performed by: FAMILY MEDICINE

## 2023-12-31 PROCEDURE — 84484 ASSAY OF TROPONIN QUANT: CPT

## 2023-12-31 PROCEDURE — 80048 BASIC METABOLIC PNL TOTAL CA: CPT

## 2023-12-31 PROCEDURE — 71045 X-RAY EXAM CHEST 1 VIEW: CPT

## 2023-12-31 PROCEDURE — 85025 COMPLETE CBC W/AUTO DIFF WBC: CPT

## 2023-12-31 RX ORDER — CEPHALEXIN 500 MG/1
500 CAPSULE ORAL 3 TIMES DAILY
Qty: 30 CAPSULE | Refills: 0 | Status: ON HOLD | OUTPATIENT
Start: 2023-12-31 | End: 2024-01-05

## 2023-12-31 RX ADMIN — IOPAMIDOL 100 ML: 755 INJECTION, SOLUTION INTRAVENOUS at 14:24

## 2023-12-31 ASSESSMENT — PAIN - FUNCTIONAL ASSESSMENT: PAIN_FUNCTIONAL_ASSESSMENT: NONE - DENIES PAIN

## 2023-12-31 NOTE — DISCHARGE INSTR - COC
Continuity of Care Form    Patient Name: Derek Kang   :  1970  MRN:  [id number]    Admit date:  2023  Discharge date:  ***    Code Status Order: Prior   Advance Directives:     Admitting Physician:  No admitting provider for patient encounter.  PCP: Woody Fraga, NICOL - CNP    Discharging Nurse: ***  Discharging Hospital Unit/Room#:   Discharging Unit Phone Number: ***    Emergency Contact:   Extended Emergency Contact Information  Primary Emergency Contact: Idalmis Kang  Address: [address] [place] [address] [facility]  Home Phone: [phone]  Work Phone: [phone]  Mobile Phone: [phone]  Relation: Spouse  Secondary Emergency Contact: Zoraida Kang  Address: *           [place] [address]  Home Phone: [phone]  Work Phone: [phone]  Mobile Phone: [phone]  Relation: Parent    Past Surgical History:  Past Surgical History:   Procedure Laterality Date    CERVICAL FUSION N/A 2019    A.C.D.F. C 6-7 performed by Dean Cortez MD at [facility] OR    COLONOSCOPY N/A 2022    COLONOSCOPY POLYPECTOMY HOT BIOPSY performed by Peter Loomis MD at [facility] ENDOSCOPY    FRACTURE SURGERY Left     left hip surgical repair post fx    HIP SURGERY Left     plates    JOINT REPLACEMENT Left     hip    TONSILLECTOMY         Immunization History:   Immunization History   Administered Date(s) Administered    COVID-19, MODERNA BLUE border, Primary or Immunocompromised, (age 12y+), IM, 100 mcg/0.5mL 2021, 2021, 2022    Influenza, FLUARIX, FLULAVAL, FLUZONE (age 6 mo+) AND AFLURIA, (age 3 y+), PF, 0.5mL 2022    TDaP, ADACEL (age 10y-64y), BOOSTRIX (age 10y+), IM, 0.5mL 2019, 10/20/2022       Active Problems:  Patient Active Problem List   Diagnosis Code    Recurrent cellulitis of lower leg L03.119    Essential hypertension I10    Cervical disc herniation M50.20    Radiculopathy M54.10    Type 2 diabetes mellitus  without complication, without long-term current use of insulin (HCC) E11.9    Family history of colon cancer in father Z80.0    Mixed hyperlipidemia E78.2       Isolation/Infection:   Isolation            No Isolation          Patient Infection Status       None to display                     Nurse Assessment:  Last Vital Signs: BP (!) 138/100   Pulse (!) 103   Temp 98.3 °F (36.8 °C) (Oral)   Resp 20   Ht 1.88 m (6' 2\")   Wt (!) 169.5 kg (373 lb 11.2 oz)   SpO2 93%   BMI 47.98 kg/m²     Last documented pain score (0-10 scale):    Last Weight:   Wt Readings from Last 1 Encounters:   12/31/23 (!) 169.5 kg (373 lb 11.2 oz)     Mental Status:  {IP PT MENTAL STATUS:20030}    IV Access:  { BULMARO IV ACCESS:761196960}    Nursing Mobility/ADLs:  Walking   {CHP DME ADLs:777898689}  Transfer  {CHP DME ADLs:574823980}  Bathing  {CHP DME ADLs:321553887}  Dressing  {CHP DME ADLs:688510975}  Toileting  {CHP DME ADLs:965987686}  Feeding  {CHP DME ADLs:581039536}  Med Admin  {P DME ADLs:198219003}  Med Delivery   { BULMARO MED Delivery:467211502}    Wound Care Documentation and Therapy:        Elimination:  Continence:   Bowel: {YES / NO:19727}  Bladder: {YES / NO:19727}  Urinary Catheter: {Urinary Catheter:911786504}   Colostomy/Ileostomy/Ileal Conduit: {YES / NO:19727}       Date of Last BM: ***  No intake or output data in the 24 hours ending 12/31/23 1607  No intake/output data recorded.    Safety Concerns:     { BULMARO Safety Concerns:311732851}    Impairments/Disabilities:      { BULMARO Impairments/Disabilities:407650629}    Nutrition Therapy:  Current Nutrition Therapy:   { BUMLARO Diet List:674060040}    Routes of Feeding: {CHP DME Other Feedings:034829180}  Liquids: {Slp liquid thickness:55464}  Daily Fluid Restriction: {CHP DME Yes amt example:482895953}  Last Modified Barium Swallow with Video (Video Swallowing Test): {Done Not Done Date:729410175}    Treatments at the Time of Hospital Discharge:   Respiratory

## 2023-12-31 NOTE — ED PROVIDER NOTES
[facility]  EMERGENCY DEPARTMENT ENCOUNTER      Pt Name: Derek Kang  MRN: [id number]  Birthdate 1970  Date of evaluation: 12/31/2023  Provider: Jesus White MD    CHIEF COMPLAINT       Chief Complaint   Patient presents with    Leg Swelling     Enzo feet swollen, enzo lower leg swelling         HISTORY OF PRESENT ILLNESS      Derek Kang is a 53 y.o. male who presents to the emergency department via private vehicle with significant other, patient complains of swollen feet, with some pain on the top of the left dorsum foot though states normocephalic and cellulitis quite in this area, as well as some shortness of breath.  Patient states discharged medical after some time and had some intermittent swelling that was concerning.  Denies any chest pain or palpitations denies any fever, does have some congestion but attributes this to wearing sleep apnea mask at night and often wakes up little congested and has to blow his nose.    PCP: TRE Scanlongman        REVIEW OF SYSTEMS       Review of Systems   Constitutional:  Negative for fever.   HENT:  Positive for congestion (\"some\", attributes to his sleep apnea mask at night).    Respiratory:  Positive for shortness of breath. Negative for chest tightness.    Cardiovascular:  Positive for leg swelling. Negative for chest pain and palpitations.   All other systems reviewed and are negative.        PAST MEDICAL HISTORY     Past Medical History:   Diagnosis Date    Arthritis     Hypertension     on meds x 2yrs    Mixed hyperlipidemia 5/9/2023    Type 2 diabetes mellitus without complication, without long-term current use of insulin (HCC) 10/26/2021    Unspecified sleep apnea     CPAP nightly use         SURGICAL HISTORY       Past Surgical History:   Procedure Laterality Date    CERVICAL FUSION N/A 12/5/2019    A.C.D.F. C 6-7 performed by Dean Cortez MD at [facility] OR    COLONOSCOPY N/A 1/24/2022    COLONOSCOPY POLYPECTOMY HOT BIOPSY performed by  of this note:    CTA CHEST W CONTRAST   Final Result      XR CHEST PORTABLE   Final Result      Mild congestive heart failure.               LABS:  Labs Reviewed   BASIC METABOLIC PANEL - Abnormal; Notable for the following components:       Result Value    Sodium 131 (*)     Chloride 96 (*)     Glucose 173 (*)     All other components within normal limits   CBC WITH AUTO DIFFERENTIAL - Abnormal; Notable for the following components:    WBC 13.2 (*)     Platelets 134 (*)     Neutrophils % 80 (*)     Lymphocytes % 9 (*)     Neutrophils Absolute 10.56 (*)     All other components within normal limits   COVID-19, RAPID   RAPID INFLUENZA A/B ANTIGENS   TROPONIN   BRAIN NATRIURETIC PEPTIDE       All other labs were within normal range or not returned as of this dictation.      MIPS    Not applicable      EMERGENCY DEPARTMENT COURSE and DIFFERENTIAL DIAGNOSIS/MDM:     Patient history and physical exam taken at bedside, discussed symptoms and exam findings, discussed initial workup to include EKG chest x-ray blood work, will get rapid COVID test and reevaluate, acknowledged    EKG as above    EMR reviewed, noting HTN HLD DM OA COLLETTE previous infections of cellulitis in the lower leg    COVID swab negative    Patient and patient wife at bedside updated on current workup, acknowledged.  Given the patient is now experiencing some chills, get influenza swab.    Chest x-ray reviewed    Rapid influenza negative    Discussion with patient patient's wife overall workup, noting negative flu and COVID, chest x-ray may have some limitation due to patient habitus but do not grossly.  Any obvious infiltrate, there is a little bit of congestion, upon further discussion with patient and wife, noting patient heart rate 95-1 05, a lot of tachypneic/tachycardic, we will go ahead and get CTA chest under PE protocol to look for potential clot to explain for patient's shortness of breath, acknowledged    CT radiology report reviewed    Case was

## 2023-12-31 NOTE — PROGRESS NOTES
Ticket to ride completed. The following information was reported off:  Name  Allergies  Orientation Level  Destination  Safety Issues  Code Status  Oxygen Requirements  Special needs including mobility, language, communication

## 2024-01-01 ENCOUNTER — HOSPITAL ENCOUNTER (INPATIENT)
Age: 54
LOS: 4 days | Discharge: HOME | DRG: 603 | End: 2024-01-05
Attending: EMERGENCY MEDICINE | Admitting: INTERNAL MEDICINE
Payer: COMMERCIAL

## 2024-01-01 DIAGNOSIS — L03.116 CELLULITIS OF LEFT LOWER EXTREMITY: Primary | ICD-10-CM

## 2024-01-01 DIAGNOSIS — E08.65 DIABETES MELLITUS DUE TO UNDERLYING CONDITION, UNCONTROLLED, WITH HYPERGLYCEMIA (HCC): ICD-10-CM

## 2024-01-01 LAB
ALBUMIN SERPL-MCNC: 3.9 G/DL (ref 3.5–5.2)
ALP SERPL-CCNC: 63 U/L (ref 40–129)
ALT SERPL-CCNC: 28 U/L (ref 5–41)
ANION GAP SERPL CALCULATED.3IONS-SCNC: 8 MMOL/L (ref 9–17)
AST SERPL-CCNC: 33 U/L
BASOPHILS # BLD: 0.01 K/UL (ref 0–0.2)
BASOPHILS NFR BLD: 0 % (ref 0–2)
BILIRUB SERPL-MCNC: 2.4 MG/DL (ref 0.3–1.2)
BUN SERPL-MCNC: 21 MG/DL (ref 6–20)
BUN/CREAT SERPL: 18 (ref 9–20)
CALCIUM SERPL-MCNC: 9.2 MG/DL (ref 8.6–10.4)
CHLORIDE SERPL-SCNC: 92 MMOL/L (ref 98–107)
CO2 SERPL-SCNC: 28 MMOL/L (ref 20–31)
CREAT SERPL-MCNC: 1.2 MG/DL (ref 0.7–1.2)
EKG ATRIAL RATE: 95 BPM
EKG P AXIS: 48 DEGREES
EKG P-R INTERVAL: 148 MS
EKG Q-T INTERVAL: 356 MS
EKG QRS DURATION: 128 MS
EKG QTC CALCULATION (BAZETT): 447 MS
EKG R AXIS: -65 DEGREES
EKG T AXIS: 61 DEGREES
EKG VENTRICULAR RATE: 95 BPM
EOSINOPHIL # BLD: 0 K/UL (ref 0–0.4)
EOSINOPHILS RELATIVE PERCENT: 0 % (ref 0–5)
ERYTHROCYTE [DISTWIDTH] IN BLOOD BY AUTOMATED COUNT: 12.9 % (ref 12.1–15.2)
GFR SERPL CREATININE-BSD FRML MDRD: >60 ML/MIN/1.73M2
GLUCOSE BLD-MCNC: 154 MG/DL (ref 65–99)
GLUCOSE BLD-MCNC: 177 MG/DL (ref 65–99)
GLUCOSE SERPL-MCNC: 163 MG/DL (ref 70–99)
HCT VFR BLD AUTO: 41.8 % (ref 41–53)
HGB BLD-MCNC: 14.4 G/DL (ref 13.5–17.5)
IMM GRANULOCYTES # BLD AUTO: 0.04 K/UL (ref 0–0.3)
IMM GRANULOCYTES NFR BLD: 0 % (ref 0–5)
LYMPHOCYTES NFR BLD: 1.13 K/UL (ref 1–4.8)
LYMPHOCYTES RELATIVE PERCENT: 7 % (ref 13–44)
MCH RBC QN AUTO: 29.6 PG (ref 26–34)
MCHC RBC AUTO-ENTMCNC: 34.4 G/DL (ref 31–37)
MCV RBC AUTO: 86 FL (ref 80–100)
MONOCYTES NFR BLD: 0.92 K/UL (ref 0–1)
MONOCYTES NFR BLD: 6 % (ref 5–9)
NEUTROPHILS NFR BLD: 87 % (ref 39–75)
NEUTS SEG NFR BLD: 14.55 K/UL (ref 2.1–6.5)
PLATELET # BLD AUTO: 124 K/UL (ref 140–450)
PMV BLD AUTO: 8.9 FL (ref 6–12)
POTASSIUM SERPL-SCNC: 3.8 MMOL/L (ref 3.7–5.3)
PROT SERPL-MCNC: 7.7 G/DL (ref 6.4–8.3)
RBC # BLD AUTO: 4.86 M/UL (ref 4.5–5.9)
SODIUM SERPL-SCNC: 128 MMOL/L (ref 135–144)
WBC OTHER # BLD: 16.7 K/UL (ref 3.5–11)

## 2024-01-01 PROCEDURE — 6370000000 HC RX 637 (ALT 250 FOR IP): Performed by: INTERNAL MEDICINE

## 2024-01-01 PROCEDURE — 85025 COMPLETE CBC W/AUTO DIFF WBC: CPT

## 2024-01-01 PROCEDURE — 93010 ELECTROCARDIOGRAM REPORT: CPT | Performed by: INTERNAL MEDICINE

## 2024-01-01 PROCEDURE — 82947 ASSAY GLUCOSE BLOOD QUANT: CPT

## 2024-01-01 PROCEDURE — 2580000003 HC RX 258: Performed by: EMERGENCY MEDICINE

## 2024-01-01 PROCEDURE — 96365 THER/PROPH/DIAG IV INF INIT: CPT

## 2024-01-01 PROCEDURE — 94761 N-INVAS EAR/PLS OXIMETRY MLT: CPT

## 2024-01-01 PROCEDURE — 87040 BLOOD CULTURE FOR BACTERIA: CPT

## 2024-01-01 PROCEDURE — 2580000003 HC RX 258: Performed by: INTERNAL MEDICINE

## 2024-01-01 PROCEDURE — 1200000000 HC SEMI PRIVATE

## 2024-01-01 PROCEDURE — 99285 EMERGENCY DEPT VISIT HI MDM: CPT

## 2024-01-01 PROCEDURE — 80053 COMPREHEN METABOLIC PANEL: CPT

## 2024-01-01 PROCEDURE — 6360000002 HC RX W HCPCS: Performed by: INTERNAL MEDICINE

## 2024-01-01 PROCEDURE — 6360000002 HC RX W HCPCS: Performed by: EMERGENCY MEDICINE

## 2024-01-01 RX ORDER — ACETAMINOPHEN 650 MG/1
650 SUPPOSITORY RECTAL EVERY 6 HOURS PRN
Status: DISCONTINUED | OUTPATIENT
Start: 2024-01-01 | End: 2024-01-05 | Stop reason: HOSPADM

## 2024-01-01 RX ORDER — DEXTROSE MONOHYDRATE 100 MG/ML
INJECTION, SOLUTION INTRAVENOUS CONTINUOUS PRN
Status: DISCONTINUED | OUTPATIENT
Start: 2024-01-01 | End: 2024-01-05 | Stop reason: HOSPADM

## 2024-01-01 RX ORDER — ATORVASTATIN CALCIUM 40 MG/1
80 TABLET, FILM COATED ORAL DAILY
Status: DISCONTINUED | OUTPATIENT
Start: 2024-01-01 | End: 2024-01-05 | Stop reason: HOSPADM

## 2024-01-01 RX ORDER — INSULIN LISPRO 100 [IU]/ML
0-4 INJECTION, SOLUTION INTRAVENOUS; SUBCUTANEOUS
Status: DISCONTINUED | OUTPATIENT
Start: 2024-01-01 | End: 2024-01-05 | Stop reason: HOSPADM

## 2024-01-01 RX ORDER — ONDANSETRON 2 MG/ML
4 INJECTION INTRAMUSCULAR; INTRAVENOUS EVERY 6 HOURS PRN
Status: DISCONTINUED | OUTPATIENT
Start: 2024-01-01 | End: 2024-01-05 | Stop reason: HOSPADM

## 2024-01-01 RX ORDER — LACTOBACILLUS RHAMNOSUS GG 10B CELL
1 CAPSULE ORAL 2 TIMES DAILY WITH MEALS
Status: DISCONTINUED | OUTPATIENT
Start: 2024-01-01 | End: 2024-01-05 | Stop reason: HOSPADM

## 2024-01-01 RX ORDER — ZOLPIDEM TARTRATE 5 MG/1
5 TABLET ORAL NIGHTLY PRN
Status: DISCONTINUED | OUTPATIENT
Start: 2024-01-01 | End: 2024-01-05 | Stop reason: HOSPADM

## 2024-01-01 RX ORDER — ENOXAPARIN SODIUM 100 MG/ML
40 INJECTION SUBCUTANEOUS 2 TIMES DAILY
Status: DISCONTINUED | OUTPATIENT
Start: 2024-01-01 | End: 2024-01-05 | Stop reason: HOSPADM

## 2024-01-01 RX ORDER — ACETAMINOPHEN 325 MG/1
650 TABLET ORAL EVERY 6 HOURS PRN
Status: DISCONTINUED | OUTPATIENT
Start: 2024-01-01 | End: 2024-01-05 | Stop reason: HOSPADM

## 2024-01-01 RX ORDER — POLYETHYLENE GLYCOL 3350 17 G/17G
17 POWDER, FOR SOLUTION ORAL DAILY PRN
Status: DISCONTINUED | OUTPATIENT
Start: 2024-01-01 | End: 2024-01-05 | Stop reason: HOSPADM

## 2024-01-01 RX ORDER — SODIUM CHLORIDE 9 MG/ML
INJECTION, SOLUTION INTRAVENOUS CONTINUOUS
Status: DISCONTINUED | OUTPATIENT
Start: 2024-01-01 | End: 2024-01-03

## 2024-01-01 RX ORDER — SODIUM CHLORIDE 0.9 % (FLUSH) 0.9 %
5-40 SYRINGE (ML) INJECTION PRN
Status: DISCONTINUED | OUTPATIENT
Start: 2024-01-01 | End: 2024-01-05 | Stop reason: HOSPADM

## 2024-01-01 RX ORDER — ONDANSETRON 4 MG/1
4 TABLET, ORALLY DISINTEGRATING ORAL EVERY 8 HOURS PRN
Status: DISCONTINUED | OUTPATIENT
Start: 2024-01-01 | End: 2024-01-05 | Stop reason: HOSPADM

## 2024-01-01 RX ORDER — INSULIN LISPRO 100 [IU]/ML
0-4 INJECTION, SOLUTION INTRAVENOUS; SUBCUTANEOUS NIGHTLY
Status: DISCONTINUED | OUTPATIENT
Start: 2024-01-01 | End: 2024-01-05 | Stop reason: HOSPADM

## 2024-01-01 RX ORDER — SODIUM CHLORIDE 0.9 % (FLUSH) 0.9 %
5-40 SYRINGE (ML) INJECTION EVERY 12 HOURS SCHEDULED
Status: DISCONTINUED | OUTPATIENT
Start: 2024-01-01 | End: 2024-01-05 | Stop reason: HOSPADM

## 2024-01-01 RX ORDER — LISINOPRIL 20 MG/1
20 TABLET ORAL DAILY
Status: DISCONTINUED | OUTPATIENT
Start: 2024-01-01 | End: 2024-01-05 | Stop reason: HOSPADM

## 2024-01-01 RX ORDER — SODIUM CHLORIDE 9 MG/ML
INJECTION, SOLUTION INTRAVENOUS PRN
Status: DISCONTINUED | OUTPATIENT
Start: 2024-01-01 | End: 2024-01-05 | Stop reason: HOSPADM

## 2024-01-01 RX ORDER — IBUPROFEN 200 MG
200 TABLET ORAL EVERY 6 HOURS PRN
Status: DISCONTINUED | OUTPATIENT
Start: 2024-01-01 | End: 2024-01-05 | Stop reason: HOSPADM

## 2024-01-01 RX ORDER — GLUCAGON 1 MG/ML
1 KIT INJECTION PRN
Status: DISCONTINUED | OUTPATIENT
Start: 2024-01-01 | End: 2024-01-05 | Stop reason: HOSPADM

## 2024-01-01 RX ORDER — ONDANSETRON 2 MG/ML
4 INJECTION INTRAMUSCULAR; INTRAVENOUS ONCE
Status: COMPLETED | OUTPATIENT
Start: 2024-01-01 | End: 2024-01-01

## 2024-01-01 RX ADMIN — ENOXAPARIN SODIUM 40 MG: 100 INJECTION SUBCUTANEOUS at 20:21

## 2024-01-01 RX ADMIN — ZOLPIDEM TARTRATE 5 MG: 5 TABLET ORAL at 21:47

## 2024-01-01 RX ADMIN — ONDANSETRON 4 MG: 2 INJECTION INTRAMUSCULAR; INTRAVENOUS at 15:53

## 2024-01-01 RX ADMIN — SODIUM CHLORIDE: 9 INJECTION, SOLUTION INTRAVENOUS at 17:29

## 2024-01-01 RX ADMIN — Medication 1 CAPSULE: at 17:26

## 2024-01-01 RX ADMIN — VANCOMYCIN HYDROCHLORIDE 2000 MG: 1 INJECTION, POWDER, LYOPHILIZED, FOR SOLUTION INTRAVENOUS at 15:19

## 2024-01-01 RX ADMIN — ACETAMINOPHEN 650 MG: 325 TABLET, FILM COATED ORAL at 20:21

## 2024-01-01 RX ADMIN — IBUPROFEN 200 MG: 200 TABLET, FILM COATED ORAL at 22:13

## 2024-01-01 RX ADMIN — CEFTRIAXONE SODIUM 2000 MG: 2 INJECTION, POWDER, FOR SOLUTION INTRAMUSCULAR; INTRAVENOUS at 17:32

## 2024-01-01 ASSESSMENT — LIFESTYLE VARIABLES
HOW OFTEN DO YOU HAVE A DRINK CONTAINING ALCOHOL: NEVER
HOW MANY STANDARD DRINKS CONTAINING ALCOHOL DO YOU HAVE ON A TYPICAL DAY: PATIENT DOES NOT DRINK

## 2024-01-01 ASSESSMENT — PAIN SCALES - GENERAL: PAINLEVEL_OUTOF10: 0

## 2024-01-01 ASSESSMENT — PAIN - FUNCTIONAL ASSESSMENT
PAIN_FUNCTIONAL_ASSESSMENT: NONE - DENIES PAIN
PAIN_FUNCTIONAL_ASSESSMENT: NONE - DENIES PAIN

## 2024-01-01 NOTE — PROGRESS NOTES
Pt arrives to room 269 via cart. A&O x4 and wife at bedside. Pt extremely slow to ambulate d/t 10/10 pain to LLE. Pt states pain subsides at rest, declines pain intervention. Cellulitis outlined with marker, see attached photo. Red and warm to the touch. Extremity elevated with pillows. Oriented to room, call light, staff, and whiteboard.    Wife to bring in cPap, eyeglasses and cell phone from home.

## 2024-01-01 NOTE — ED NOTES
Unable to obtain second blood culture sample. Dr. Jenkins made aware and ok with one set of blood cultures.

## 2024-01-01 NOTE — ED PROVIDER NOTES
eMERGENCY dEPARTMENT eNCOUnter        CHIEF COMPLAINT    Chief Complaint   Patient presents with    Cellulitis     Pt dx with cellulitis yesterday and started on Keflex. PT has been spiking fevers throughout the night with increased swelling and redness to LLE.        HPI    Derek Kang is a 53 y.o. male who presents to ED with left lower extremity edema redness.  Patient was seen in ED yesterday and was treated for cellulitis.  Patient was started on cephalexin.  Patient took 3 doses of cephalexin.  Patient presents today with worsening of the redness and swelling.  Patient also has been nauseous patient reports some chills.  Patient has history of hypertension and type 2 diabetes.  REVIEW OF SYSTEMS    All systems reviewed and positives are in the HPI      PAST MEDICAL HISTORY    Past Medical History:   Diagnosis Date    Arthritis     Hypertension     on meds x 2yrs    Mixed hyperlipidemia 5/9/2023    Type 2 diabetes mellitus without complication, without long-term current use of insulin ([facility]) 10/26/2021    Unspecified sleep apnea     CPAP nightly use       SURGICAL HISTORY    Past Surgical History:   Procedure Laterality Date    CERVICAL FUSION N/A 12/5/2019    A.C.D.F. C 6-7 performed by Dean Cortez MD at [facility] OR    COLONOSCOPY N/A 1/24/2022    COLONOSCOPY POLYPECTOMY HOT BIOPSY performed by Peter Loomis MD at [facility] ENDOSCOPY    FRACTURE SURGERY Left 1997    left hip surgical repair post fx    HIP SURGERY Left     plates    JOINT REPLACEMENT Left 1997    hip    TONSILLECTOMY         CURRENT MEDICATIONS    Current Outpatient Rx   Medication Sig Dispense Refill    cephALEXin (KEFLEX) 500 MG capsule Take 1 capsule by mouth 3 times daily for 10 days 30 capsule 0    atorvastatin (LIPITOR) 80 MG tablet TAKE 1 TABLET BY MOUTH DAILY 90 tablet 1    lisinopril (PRINIVIL;ZESTRIL) 20 MG tablet Take 1 tablet by mouth daily 90 tablet 1    blood glucose monitor kit and supplies Please dispense glucometer per  Ill-appearing male with low-grade fever in ED.  Left lower extremity with edema redness and warmth to touch.    HENT:  Atraumatic, external ears normal, nose normal, oropharynx moist.  Neck- normal range of motion, no tenderness, supple   Respiratory:  No respiratory distress, normal breath sounds.   Cardiovascular:  Normal rate, normal rhythm, no murmurs, no gallops, no rubs   GI:  Soft, nondistended, normal bowel sounds, nontender   Musculoskeletal: Left lower extremity with edema redness and warm to touch  Integument:  Well hydrated, no rash   Neurologic: Alert and oriented x 3 no focal deficits    RADIOLOGY/PROCEDURES    No orders to display         Labs  Labs Reviewed   CBC WITH AUTO DIFFERENTIAL - Abnormal; Notable for the following components:       Result Value    WBC 16.7 (*)     Platelets 124 (*)     Neutrophils % 87 (*)     Lymphocytes % 7 (*)     Neutrophils Absolute 14.55 (*)     All other components within normal limits   COMPREHENSIVE METABOLIC PANEL - Abnormal; Notable for the following components:    Sodium 128 (*)     Chloride 92 (*)     Anion Gap 8 (*)     Glucose 163 (*)     BUN 21 (*)     Total Bilirubin 2.4 (*)     All other components within normal limits   CULTURE, BLOOD 1             EMERGENCY DEPARTMENT COURSE and DIFFERENTIAL DIAGNOSIS/MDM:    Patient Course: Patient is 53-year-old male that presents to ED with worsening left lower extremity cellulitis.  Patient has history of diabetes and hypertension.  Patient has been nauseous for couple days.  Patient has low-grade fever.    Number and complexity of problems:    Differential Diagnosis: Sepsis    Pertinent Comorbid Conditions: History of diabetes and hypertension    2)  Data Reviewed    Decision Rules/Scores utilized:      Labs/tests: Reviewed    EKG/rhythm strips:     Radiology interpretation/review: CT chest from yesterday reviewed          3)  Treatment and Disposition    Patient repeat assessment: On repeat assessment patient

## 2024-01-02 ENCOUNTER — APPOINTMENT (OUTPATIENT)
Dept: VASCULAR LAB | Age: 54
DRG: 603 | End: 2024-01-02
Payer: COMMERCIAL

## 2024-01-02 ENCOUNTER — APPOINTMENT (OUTPATIENT)
Dept: CT IMAGING | Age: 54
DRG: 603 | End: 2024-01-02
Payer: COMMERCIAL

## 2024-01-02 LAB
ANION GAP SERPL CALCULATED.3IONS-SCNC: 9 MMOL/L (ref 9–17)
BASOPHILS # BLD: 0.01 K/UL (ref 0–0.2)
BASOPHILS NFR BLD: 0 % (ref 0–2)
BUN SERPL-MCNC: 22 MG/DL (ref 6–20)
BUN/CREAT SERPL: 20 (ref 9–20)
CALCIUM SERPL-MCNC: 8.5 MG/DL (ref 8.6–10.4)
CHLORIDE SERPL-SCNC: 96 MMOL/L (ref 98–107)
CO2 SERPL-SCNC: 24 MMOL/L (ref 20–31)
CREAT SERPL-MCNC: 1.1 MG/DL (ref 0.7–1.2)
EOSINOPHIL # BLD: 0.01 K/UL (ref 0–0.4)
EOSINOPHILS RELATIVE PERCENT: 0 % (ref 0–5)
ERYTHROCYTE [DISTWIDTH] IN BLOOD BY AUTOMATED COUNT: 13 % (ref 12.1–15.2)
GFR SERPL CREATININE-BSD FRML MDRD: >60 ML/MIN/1.73M2
GLUCOSE BLD-MCNC: 157 MG/DL (ref 65–99)
GLUCOSE BLD-MCNC: 163 MG/DL (ref 65–99)
GLUCOSE BLD-MCNC: 167 MG/DL (ref 65–99)
GLUCOSE BLD-MCNC: 212 MG/DL (ref 65–99)
GLUCOSE SERPL-MCNC: 167 MG/DL (ref 70–99)
HCT VFR BLD AUTO: 38.1 % (ref 41–53)
HGB BLD-MCNC: 13.2 G/DL (ref 13.5–17.5)
IMM GRANULOCYTES # BLD AUTO: 0.04 K/UL (ref 0–0.3)
IMM GRANULOCYTES NFR BLD: 0 % (ref 0–5)
LACTATE BLDV-SCNC: 1.1 MMOL/L (ref 0.5–2.2)
LYMPHOCYTES NFR BLD: 0.86 K/UL (ref 1–4.8)
LYMPHOCYTES RELATIVE PERCENT: 6 % (ref 13–44)
MCH RBC QN AUTO: 29.7 PG (ref 26–34)
MCHC RBC AUTO-ENTMCNC: 34.6 G/DL (ref 31–37)
MCV RBC AUTO: 85.8 FL (ref 80–100)
MONOCYTES NFR BLD: 0.64 K/UL (ref 0–1)
MONOCYTES NFR BLD: 4 % (ref 5–9)
NEUTROPHILS NFR BLD: 90 % (ref 39–75)
NEUTS SEG NFR BLD: 13.5 K/UL (ref 2.1–6.5)
PLATELET # BLD AUTO: 101 K/UL (ref 140–450)
PMV BLD AUTO: 8.6 FL (ref 6–12)
POTASSIUM SERPL-SCNC: 3.7 MMOL/L (ref 3.7–5.3)
RBC # BLD AUTO: 4.44 M/UL (ref 4.5–5.9)
SODIUM SERPL-SCNC: 129 MMOL/L (ref 135–144)
WBC OTHER # BLD: 15.1 K/UL (ref 3.5–11)

## 2024-01-02 PROCEDURE — 83605 ASSAY OF LACTIC ACID: CPT

## 2024-01-02 PROCEDURE — 6370000000 HC RX 637 (ALT 250 FOR IP): Performed by: INTERNAL MEDICINE

## 2024-01-02 PROCEDURE — 85025 COMPLETE CBC W/AUTO DIFF WBC: CPT

## 2024-01-02 PROCEDURE — 6360000002 HC RX W HCPCS: Performed by: INTERNAL MEDICINE

## 2024-01-02 PROCEDURE — 73700 CT LOWER EXTREMITY W/O DYE: CPT

## 2024-01-02 PROCEDURE — 1200000000 HC SEMI PRIVATE

## 2024-01-02 PROCEDURE — 82947 ASSAY GLUCOSE BLOOD QUANT: CPT

## 2024-01-02 PROCEDURE — 80048 BASIC METABOLIC PNL TOTAL CA: CPT

## 2024-01-02 PROCEDURE — 94761 N-INVAS EAR/PLS OXIMETRY MLT: CPT

## 2024-01-02 PROCEDURE — 2580000003 HC RX 258: Performed by: INTERNAL MEDICINE

## 2024-01-02 PROCEDURE — 93970 EXTREMITY STUDY: CPT

## 2024-01-02 PROCEDURE — 36415 COLL VENOUS BLD VENIPUNCTURE: CPT

## 2024-01-02 RX ORDER — TRAMADOL HYDROCHLORIDE 50 MG/1
50 TABLET ORAL EVERY 6 HOURS PRN
Status: DISCONTINUED | OUTPATIENT
Start: 2024-01-02 | End: 2024-01-05 | Stop reason: HOSPADM

## 2024-01-02 RX ORDER — TRAMADOL HYDROCHLORIDE 50 MG/1
100 TABLET ORAL EVERY 6 HOURS PRN
Status: DISCONTINUED | OUTPATIENT
Start: 2024-01-02 | End: 2024-01-05 | Stop reason: HOSPADM

## 2024-01-02 RX ADMIN — TRAMADOL HYDROCHLORIDE 50 MG: 50 TABLET, COATED ORAL at 15:09

## 2024-01-02 RX ADMIN — ZOLPIDEM TARTRATE 5 MG: 5 TABLET ORAL at 21:06

## 2024-01-02 RX ADMIN — SODIUM CHLORIDE: 9 INJECTION, SOLUTION INTRAVENOUS at 17:30

## 2024-01-02 RX ADMIN — CEFTRIAXONE SODIUM 2000 MG: 2 INJECTION, POWDER, FOR SOLUTION INTRAMUSCULAR; INTRAVENOUS at 18:06

## 2024-01-02 RX ADMIN — ACETAMINOPHEN 650 MG: 325 TABLET, FILM COATED ORAL at 11:47

## 2024-01-02 RX ADMIN — ACETAMINOPHEN 650 MG: 325 TABLET, FILM COATED ORAL at 03:16

## 2024-01-02 RX ADMIN — VANCOMYCIN HYDROCHLORIDE 1250 MG: 1 INJECTION, POWDER, LYOPHILIZED, FOR SOLUTION INTRAVENOUS at 03:10

## 2024-01-02 RX ADMIN — IBUPROFEN 200 MG: 200 TABLET, FILM COATED ORAL at 18:19

## 2024-01-02 RX ADMIN — ENOXAPARIN SODIUM 40 MG: 100 INJECTION SUBCUTANEOUS at 09:35

## 2024-01-02 RX ADMIN — Medication 1 CAPSULE: at 09:35

## 2024-01-02 RX ADMIN — VANCOMYCIN HYDROCHLORIDE 750 MG: 750 INJECTION, POWDER, LYOPHILIZED, FOR SOLUTION INTRAVENOUS at 11:51

## 2024-01-02 RX ADMIN — VANCOMYCIN HYDROCHLORIDE 750 MG: 750 INJECTION, POWDER, LYOPHILIZED, FOR SOLUTION INTRAVENOUS at 18:49

## 2024-01-02 RX ADMIN — SODIUM CHLORIDE: 9 INJECTION, SOLUTION INTRAVENOUS at 03:07

## 2024-01-02 RX ADMIN — ENOXAPARIN SODIUM 40 MG: 100 INJECTION SUBCUTANEOUS at 21:06

## 2024-01-02 RX ADMIN — TRAMADOL HYDROCHLORIDE 100 MG: 50 TABLET, COATED ORAL at 21:06

## 2024-01-02 RX ADMIN — ATORVASTATIN CALCIUM 80 MG: 40 TABLET, FILM COATED ORAL at 09:36

## 2024-01-02 RX ADMIN — Medication 1 CAPSULE: at 18:07

## 2024-01-02 ASSESSMENT — PAIN SCALES - GENERAL
PAINLEVEL_OUTOF10: 7
PAINLEVEL_OUTOF10: 2
PAINLEVEL_OUTOF10: 0
PAINLEVEL_OUTOF10: 3
PAINLEVEL_OUTOF10: 4
PAINLEVEL_OUTOF10: 0
PAINLEVEL_OUTOF10: 1

## 2024-01-02 ASSESSMENT — PAIN DESCRIPTION - DESCRIPTORS
DESCRIPTORS: ACHING;CRAMPING;TIGHTNESS
DESCRIPTORS: ACHING
DESCRIPTORS: TIGHTNESS
DESCRIPTORS: TIGHTNESS

## 2024-01-02 ASSESSMENT — PAIN - FUNCTIONAL ASSESSMENT
PAIN_FUNCTIONAL_ASSESSMENT: 0-10
PAIN_FUNCTIONAL_ASSESSMENT: FACE, LEGS, ACTIVITY, CRY, AND CONSOLABILITY (FLACC)
PAIN_FUNCTIONAL_ASSESSMENT: NONE - DENIES PAIN
PAIN_FUNCTIONAL_ASSESSMENT: PREVENTS OR INTERFERES SOME ACTIVE ACTIVITIES AND ADLS

## 2024-01-02 ASSESSMENT — PAIN DESCRIPTION - ORIENTATION
ORIENTATION: LEFT

## 2024-01-02 ASSESSMENT — PAIN DESCRIPTION - LOCATION
LOCATION: LEG

## 2024-01-02 ASSESSMENT — PAIN SCALES - WONG BAKER
WONGBAKER_NUMERICALRESPONSE: 0
WONGBAKER_NUMERICALRESPONSE: 0

## 2024-01-02 NOTE — PROGRESS NOTES
MITZI and RN case manager met with pt and spouse to complete assessment. Pt is alert and oriented and pleasant with assessment. Pt is a 53 year old  male admitted for cellulitis of left lower leg. Pt and spouse live in their home in [place]. Pt has a CPAP at home but uses no other DME. Pt was not using any community services prior to admission. Pt drives and is able to get to appointments without concerns.     Pt is a full code and follows with Ryder rFaga CNP as PCP. Pt does not have advance directives but states that his spouse would be his decision maker if needed. ACP note completed with pt. Pt reports that his medications are affordable with insurance. Pt identifies no discharge needs or concerns at this time. Plan for pt to return home with spouse when ready for discharge. MITZI following. Sultana GALLEGOS LSW 1/2/2024

## 2024-01-02 NOTE — ACP (ADVANCE CARE PLANNING)
Advance Care Planning     Advance Care Planning Activator (Inpatient)  Conversation Note      Date of ACP Conversation: 1/2/2024     Conversation Conducted with: Patient with Decision Making Capacity    ACP Activator: PALOMO Rao        Health Care Decision Maker:     Current Designated Health Care Decision Maker:     Primary Decision Maker: Idalmis Kang - Spouse - [phone]  Click here to complete Healthcare Decision Makers including section of the Healthcare Decision Maker Relationship (ie \"Primary\")  Today we documented Decision Maker(s) consistent with Legal Next of Kin hierarchy.    Care Preferences    Ventilation:  \"If you were in your present state of health and suddenly became very ill and were unable to breathe on your own, what would your preference be about the use of a ventilator (breathing machine) if it were available to you?\"      Would the patient desire the use of ventilator (breathing machine)?: yes    \"If your health worsens and it becomes clear that your chance of recovery is unlikely, what would your preference be about the use of a ventilator (breathing machine) if it were available to you?\"     Would the patient desire the use of ventilator (breathing machine)?: No      Resuscitation  \"CPR works best to restart the heart when there is a sudden event, like a heart attack, in someone who is otherwise healthy. Unfortunately, CPR does not typically restart the heart for people who have serious health conditions or who are very sick.\"    \"In the event your heart stopped as a result of an underlying serious health condition, would you want attempts to be made to restart your heart (answer \"yes\" for attempt to resuscitate) or would you prefer a natural death (answer \"no\" for do not attempt to resuscitate)?\" yes       [] Yes   [x] No   Educated Patient / Decision Maker regarding differences between Advance Directives and portable DNR orders.    Length of ACP Conversation in

## 2024-01-02 NOTE — PROGRESS NOTES
Patient inquiring about outpatient order for ECHO given upon discharge from ED on 12/31 via Dr. Christopher MD. Scheduled for Monday 1/8/24 @ 1 pm in [place].

## 2024-01-02 NOTE — CONSULTS
Vancomycin Dosing by Pharmacy - Initial Note   TODAY'S DATE:  1/2/2024  Patient name, age:  Derek Kang, 53 y.o.    Indication:  SSTI  Additional antimicrobials:  ceftriaxone 2G Q24H    Allergies:  Bactrim   Actual Weight:    Wt Readings from Last 1 Encounters:   01/01/24 (!) 168.3 kg (371 lb 1.6 oz)     Labs/Ancillary Data  Estimated Creatinine Clearance: 131 mL/min (based on SCr of 1.1 mg/dL).  Recent Labs     12/31/23  1211 01/01/24  1429 01/02/24  0456   CREATININE 1.1 1.2 1.1   BUN 17 21* 22*   WBC 13.2* 16.7* 15.1*     No results found for: \"PROCAL\"    Intake/Output Summary (Last 24 hours) at 1/2/2024 0757  Last data filed at 1/1/2024 2213  Gross per 24 hour   Intake 240 ml   Output 550 ml   Net -310 ml     Temp: 102.4    Recent vancomycin administrations                     vancomycin (VANCOCIN) 1,250 mg in sodium chloride 0.9 % 250 mL IVPB (mg) 1,250 mg New Bag 01/02/24 0310    vancomycin (VANCOCIN) 2,000 mg in sodium chloride 0.9 % 500 mL IVPB (mg) 2,000 mg New Bag 01/01/24 1519                  Culture Date / Source  /  Results  12/31                Viral          Negative  1/1                    Blood        No growth 15 hours    MRSA Nasal Swab: N/A. Non-respiratory infection..    PLAN   Initial loading dose of 2,000 mg given followed by 1250 mg Q12H  Will change dose to 750 mg Q8H for an estimated AUC of 461 and trough of 14.3  Ensured BUN/sCr ordered at baseline and every 48 hours x at least 3 levels, then at least weekly.  Vancomycin level ordered for 1/3 @ 0600. Will use bayesian method for dosing.  This level will not be a trough.  Target AUC/CANDY: 400-600.      Vancomycin Target Concentration Parameters  Treatment  Population Target AUC/CANDY Target Trough   Invasive MRSA Infection (bacteremia, pneumonia, meningitis, endocarditis, osteomyelitis)  Sepsis (undifferentiated) 400-600 N/A   Infection due to non-MRSA pathogen  Empiric treatment of non-invasive MRSA infection  (SSTI, UTI) <500 10-15

## 2024-01-02 NOTE — H&P
Hospital Medicine  History and Physical    Patient:  Derek Kang  MRN: [id number]    CHIEF COMPLAINT:  Left lower leg swelling, redness, and pain.    History Obtained From:  patient  PCP: Woody Fraga APRN - CNP    HISTORY OF PRESENT ILLNESS:   The patient is a 53 y.o. male who presents to the ER with worsening left lower leg redness, swelling, and pain.  According to Viral who has a history of HTN, Hyperlipidemia, and diet controlled DM II, he started with left foot swelling approximately 2 weeks ago.  Gradually the swelling extended up into his left lower leg with an increase in redness.  He states the pain gradually started to become painful which was started about a week ago.  Around that time, he started to have low grade temperatures and not feeling well.  Viral has a history of cellulitis in the right leg several years ago so  he knew this was starting.  The pain was much worse with any pressure on the left foot but would get a little better with walking around.    He states when he got up he felt a little more SOB but felt this was secondary to pain.  On 12/31/23, the pain and redness worsened and he came to the ER for further evaluation. CTA of the chest was negative for PE.   Viral was discharged on oral Keflex 500 mg three times a day with a line drawn around the area of erythema.  He was told to return to the ER if the redness progressed passed this line.  Viral states despite taking the antibiotic, the swelling, pain, and redness worsened.  With this he returned to the ER.  In the ER his CMP was normal other than a Sodium of 128, Chloride 92, BUN 21, Glucose 163, and Total bilirubin at 2.4.  CBC showed a WBC at 16.7, Hgb 14.4, and Plt ct at 124.  Blood culture was drawn.   With out patient failure on oral antibiotics, Viral was admitted for IV antibiotics, IV hydration, and close monitoring of condition.      Past Medical History:        Diagnosis Date    Arthritis     Hypertension     on meds x

## 2024-01-02 NOTE — PROGRESS NOTES
phoned and notified by this nurse of pts oral temperature of 102.4 degrees. New order for Ibuprofen 200mg PO q6h PRN.

## 2024-01-02 NOTE — PROGRESS NOTES
Ticket to ride completed. The following information was reported off to Sultana with Radiology:  Name  Allergy  Orientation Level  Destination  Safety Issues  Code Status  Special needs including mobility, language, communication

## 2024-01-02 NOTE — PROGRESS NOTES
phoned and notified by this nurse of Sepsis Risk Score >=5. No new orders regarding this. Pt also requesting medication to sleep. Ambien 5mg PO qhs PRN ordered.

## 2024-01-02 NOTE — PLAN OF CARE
Problem: Discharge Planning  Goal: Discharge to home or other facility with appropriate resources  Outcome: Progressing     Problem: Pain  Goal: Verbalizes/displays adequate comfort level or baseline comfort level  Outcome: Progressing     Problem: Safety - Adult  Goal: Free from fall injury  Outcome: Progressing     Problem: ABCDS Injury Assessment  Goal: Absence of physical injury  Outcome: Progressing     Problem: Respiratory - Adult  Goal: Achieves optimal ventilation and oxygenation  Outcome: Progressing     Problem: Cardiovascular - Adult  Goal: Maintains optimal cardiac output and hemodynamic stability  Outcome: Progressing  Goal: Absence of cardiac dysrhythmias or at baseline  Outcome: Progressing     Problem: Skin/Tissue Integrity - Adult  Goal: Skin integrity remains intact  Outcome: Progressing  Goal: Incisions, wounds, or drain sites healing without S/S of infection  Outcome: Progressing  Goal: Oral mucous membranes remain intact  Outcome: Progressing     Problem: Musculoskeletal - Adult  Goal: Return mobility to safest level of function  Outcome: Progressing  Goal: Maintain proper alignment of affected body part  Outcome: Progressing  Goal: Return ADL status to a safe level of function  Outcome: Progressing     Problem: Gastrointestinal - Adult  Goal: Minimal or absence of nausea and vomiting  Outcome: Progressing  Goal: Maintains or returns to baseline bowel function  Outcome: Progressing  Goal: Maintains adequate nutritional intake  Outcome: Progressing     Problem: Genitourinary - Adult  Goal: Absence of urinary retention  Outcome: Progressing     Problem: Infection - Adult  Goal: Absence of fever/infection during anticipated neutropenic period  Outcome: Progressing     Problem: Metabolic/Fluid and Electrolytes - Adult  Goal: Electrolytes maintained within normal limits  Outcome: Progressing  Goal: Hemodynamic stability and optimal renal function maintained  Outcome: Progressing  Goal: Glucose

## 2024-01-02 NOTE — CARE COORDINATION
Case Management Assessment  Initial Evaluation    Date/Time of Evaluation: 1/2/2024 12:40 PM  Assessment Completed by: Adrian Kidd RN    If patient is discharged prior to next notation, then this note serves as note for discharge by case management.    Patient Name: Derek Kang                   YOB: 1970  Diagnosis: Cellulitis of left lower extremity [L03.116]  Cellulitis of left lower leg [L03.116]  Diabetes mellitus due to underlying condition, uncontrolled, with hyperglycemia (HCC) [E08.65]                   Date / Time: 1/1/2024  1:59 PM    Patient Admission Status: Inpatient   Readmission Risk (Low < 19, Mod (19-27), High > 27): Readmission Risk Score: 10.1    Current PCP: Woody Fraga APRN - CNP  PCP verified by CM? (P) Yes (Ryder Fraga CNP)    Chart Reviewed: Yes      History Provided by: (P) Patient  Patient Orientation: (P) Person, Place, Situation, Self, Alert and Oriented    Patient Cognition: (P) Alert    Hospitalization in the last 30 days (Readmission):  No    If yes, Readmission Assessment in CM Navigator will be completed.    Advance Directives:      Code Status: Full Code   Patient's Primary Decision Maker is: (P) Legal Next of Kin      Discharge Planning:    Patient lives with: (P) Spouse/Significant Other Type of Home: (P) House  Primary Care Giver: (P) Self  Patient Support Systems include: (P) Spouse/Significant Other, Children, Family Members   Current Financial resources: (P) None  Current community resources: (P) None  Current services prior to admission: (P) C-pap            Current DME:              Type of Home Care services:  (P) None    ADLS  Prior functional level: (P) Independent in ADLs/IADLs  Current functional level: (P) Independent in ADLs/IADLs    PT AM-PAC:   /24  OT AM-PAC:   /24    Family can provide assistance at DC: (P) Yes  Would you like Case Management to discuss the discharge plan with any other family members/significant others,

## 2024-01-03 ENCOUNTER — TELEPHONE (OUTPATIENT)
Dept: FAMILY MEDICINE CLINIC | Age: 54
End: 2024-01-03

## 2024-01-03 LAB
ANION GAP SERPL CALCULATED.3IONS-SCNC: 6 MMOL/L (ref 9–17)
BASOPHILS # BLD: 0.01 K/UL (ref 0–0.2)
BASOPHILS NFR BLD: 0 % (ref 0–2)
BUN SERPL-MCNC: 16 MG/DL (ref 6–20)
BUN/CREAT SERPL: 18 (ref 9–20)
CALCIUM SERPL-MCNC: 8.5 MG/DL (ref 8.6–10.4)
CHLORIDE SERPL-SCNC: 98 MMOL/L (ref 98–107)
CO2 SERPL-SCNC: 25 MMOL/L (ref 20–31)
CREAT SERPL-MCNC: 0.9 MG/DL (ref 0.7–1.2)
DATE LAST DOSE: NORMAL
EOSINOPHIL # BLD: 0.07 K/UL (ref 0–0.4)
EOSINOPHILS RELATIVE PERCENT: 1 % (ref 0–5)
ERYTHROCYTE [DISTWIDTH] IN BLOOD BY AUTOMATED COUNT: 13.1 % (ref 12.1–15.2)
GFR SERPL CREATININE-BSD FRML MDRD: >60 ML/MIN/1.73M2
GLUCOSE BLD-MCNC: 156 MG/DL (ref 65–99)
GLUCOSE BLD-MCNC: 167 MG/DL (ref 65–99)
GLUCOSE BLD-MCNC: 168 MG/DL (ref 65–99)
GLUCOSE BLD-MCNC: 169 MG/DL (ref 65–99)
GLUCOSE SERPL-MCNC: 179 MG/DL (ref 70–99)
HCT VFR BLD AUTO: 36.8 % (ref 41–53)
HGB BLD-MCNC: 12.6 G/DL (ref 13.5–17.5)
IMM GRANULOCYTES # BLD AUTO: 0.08 K/UL (ref 0–0.3)
IMM GRANULOCYTES NFR BLD: 1 % (ref 0–5)
LYMPHOCYTES NFR BLD: 1.01 K/UL (ref 1–4.8)
LYMPHOCYTES RELATIVE PERCENT: 8 % (ref 13–44)
MCH RBC QN AUTO: 29.7 PG (ref 26–34)
MCHC RBC AUTO-ENTMCNC: 34.2 G/DL (ref 31–37)
MCV RBC AUTO: 86.8 FL (ref 80–100)
MONOCYTES NFR BLD: 0.92 K/UL (ref 0–1)
MONOCYTES NFR BLD: 7 % (ref 5–9)
NEUTROPHILS NFR BLD: 83 % (ref 39–75)
NEUTS SEG NFR BLD: 11 K/UL (ref 2.1–6.5)
PLATELET # BLD AUTO: 112 K/UL (ref 140–450)
PMV BLD AUTO: 9.2 FL (ref 6–12)
POTASSIUM SERPL-SCNC: 3.8 MMOL/L (ref 3.7–5.3)
RBC # BLD AUTO: 4.24 M/UL (ref 4.5–5.9)
SODIUM SERPL-SCNC: 129 MMOL/L (ref 135–144)
TME LAST DOSE: 415 H
VANCOMYCIN DOSE: NORMAL MG
VANCOMYCIN SERPL-MCNC: 14.2 UG/ML
WBC OTHER # BLD: 13.1 K/UL (ref 3.5–11)

## 2024-01-03 PROCEDURE — 94761 N-INVAS EAR/PLS OXIMETRY MLT: CPT

## 2024-01-03 PROCEDURE — 6370000000 HC RX 637 (ALT 250 FOR IP): Performed by: INTERNAL MEDICINE

## 2024-01-03 PROCEDURE — 1200000000 HC SEMI PRIVATE

## 2024-01-03 PROCEDURE — 80048 BASIC METABOLIC PNL TOTAL CA: CPT

## 2024-01-03 PROCEDURE — 2580000003 HC RX 258: Performed by: INTERNAL MEDICINE

## 2024-01-03 PROCEDURE — 82947 ASSAY GLUCOSE BLOOD QUANT: CPT

## 2024-01-03 PROCEDURE — 6360000002 HC RX W HCPCS: Performed by: INTERNAL MEDICINE

## 2024-01-03 PROCEDURE — 85025 COMPLETE CBC W/AUTO DIFF WBC: CPT

## 2024-01-03 PROCEDURE — 80202 ASSAY OF VANCOMYCIN: CPT

## 2024-01-03 PROCEDURE — 36415 COLL VENOUS BLD VENIPUNCTURE: CPT

## 2024-01-03 RX ADMIN — VANCOMYCIN HYDROCHLORIDE 1250 MG: 500 INJECTION, POWDER, LYOPHILIZED, FOR SOLUTION INTRAVENOUS at 20:55

## 2024-01-03 RX ADMIN — ZOLPIDEM TARTRATE 5 MG: 5 TABLET ORAL at 20:49

## 2024-01-03 RX ADMIN — Medication 1 CAPSULE: at 08:38

## 2024-01-03 RX ADMIN — SODIUM CHLORIDE: 9 INJECTION, SOLUTION INTRAVENOUS at 06:22

## 2024-01-03 RX ADMIN — ENOXAPARIN SODIUM 40 MG: 100 INJECTION SUBCUTANEOUS at 08:38

## 2024-01-03 RX ADMIN — TRAMADOL HYDROCHLORIDE 100 MG: 50 TABLET, COATED ORAL at 06:24

## 2024-01-03 RX ADMIN — ATORVASTATIN CALCIUM 80 MG: 40 TABLET, FILM COATED ORAL at 08:38

## 2024-01-03 RX ADMIN — CEFTRIAXONE SODIUM 2000 MG: 2 INJECTION, POWDER, FOR SOLUTION INTRAMUSCULAR; INTRAVENOUS at 17:37

## 2024-01-03 RX ADMIN — Medication 1 CAPSULE: at 17:37

## 2024-01-03 RX ADMIN — LISINOPRIL 20 MG: 20 TABLET ORAL at 08:38

## 2024-01-03 RX ADMIN — VANCOMYCIN HYDROCHLORIDE 1250 MG: 500 INJECTION, POWDER, LYOPHILIZED, FOR SOLUTION INTRAVENOUS at 12:04

## 2024-01-03 RX ADMIN — TRAMADOL HYDROCHLORIDE 100 MG: 50 TABLET, COATED ORAL at 20:49

## 2024-01-03 RX ADMIN — SODIUM CHLORIDE, PRESERVATIVE FREE 10 ML: 5 INJECTION INTRAVENOUS at 08:47

## 2024-01-03 RX ADMIN — SODIUM CHLORIDE, PRESERVATIVE FREE 10 ML: 5 INJECTION INTRAVENOUS at 20:50

## 2024-01-03 RX ADMIN — ENOXAPARIN SODIUM 40 MG: 100 INJECTION SUBCUTANEOUS at 20:49

## 2024-01-03 RX ADMIN — VANCOMYCIN HYDROCHLORIDE 750 MG: 750 INJECTION, POWDER, LYOPHILIZED, FOR SOLUTION INTRAVENOUS at 03:10

## 2024-01-03 ASSESSMENT — PAIN - FUNCTIONAL ASSESSMENT
PAIN_FUNCTIONAL_ASSESSMENT: ACTIVITIES ARE NOT PREVENTED
PAIN_FUNCTIONAL_ASSESSMENT: PREVENTS OR INTERFERES SOME ACTIVE ACTIVITIES AND ADLS

## 2024-01-03 ASSESSMENT — PAIN SCALES - GENERAL
PAINLEVEL_OUTOF10: 0
PAINLEVEL_OUTOF10: 7
PAINLEVEL_OUTOF10: 7
PAINLEVEL_OUTOF10: 0
PAINLEVEL_OUTOF10: 0

## 2024-01-03 ASSESSMENT — PAIN DESCRIPTION - LOCATION
LOCATION: LEG
LOCATION: LEG

## 2024-01-03 ASSESSMENT — PAIN DESCRIPTION - ORIENTATION
ORIENTATION: LEFT;LOWER
ORIENTATION: LEFT;LOWER

## 2024-01-03 ASSESSMENT — PAIN DESCRIPTION - DESCRIPTORS
DESCRIPTORS: CRAMPING;DISCOMFORT;SORE
DESCRIPTORS: THROBBING

## 2024-01-03 NOTE — PROGRESS NOTES
Hospitalist Progress Note  1/3/2024 6:44 AM  Subjective:   Admit Date: 1/1/2024  PCP: Woody Fraga, APRN - CNP    Interval History: Viral has no complaints this morning.  He continues with left leg pain but slowly improving.  Fever has resolved and WBC improving.  No chest pain or SOB.  CT and US of the leg reviewed with Viral.  Appetite is good, no nausea.      Diet: ADULT DIET; Regular; 4 carb choices (60 gm/meal)  Medications:   Scheduled Meds:   vancomycin  750 mg IntraVENous Q8H    atorvastatin  80 mg Oral Daily    lisinopril  20 mg Oral Daily    sodium chloride flush  5-40 mL IntraVENous 2 times per day    enoxaparin  40 mg SubCUTAneous BID    cefTRIAXone (ROCEPHIN) IV  2,000 mg IntraVENous Q24H    insulin lispro  0-4 Units SubCUTAneous TID WC    insulin lispro  0-4 Units SubCUTAneous Nightly    lactobacillus  1 capsule Oral BID WC    vancomycin (VANCOCIN) intermittent dosing (placeholder)   Other RX Placeholder     Continuous Infusions:   sodium chloride      sodium chloride 100 mL/hr at 01/03/24 0622    dextrose         Patient's current medications documented, reviewed, and updated.      CBC:   Recent Labs     01/01/24  1429 01/02/24  0456 01/03/24  0455   WBC 16.7* 15.1* 13.1*   HGB 14.4 13.2* 12.6*   * 101* 112*     BMP:    Recent Labs     01/01/24  1429 01/02/24  0456 01/03/24  0455   * 129* 129*   K 3.8 3.7 3.8   CL 92* 96* 98   CO2 28 24 25   BUN 21* 22* 16   CREATININE 1.2 1.1 0.9   GLUCOSE 163* 167* 179*     Hepatic:   Recent Labs     01/01/24  1429   AST 33   ALT 28   BILITOT 2.4*   ALKPHOS 63     Troponin: No results for input(s): \"TROPONINI\" in the last 72 hours.  BNP: No results for input(s): \"BNP\" in the last 72 hours.  Lipids: No results for input(s): \"CHOL\", \"HDL\" in the last 72 hours.    Invalid input(s): \"LDLCALCU\"  INR: No results for input(s): \"INR\" in the last 72 hours.      Objective:   Vitals: /60   Pulse 77   Temp 98.3 °F (36.8 °C) (Axillary)   Resp 18   Ht  1.905 m (6' 3\")   Wt (!) 168.3 kg (371 lb 1.6 oz)   SpO2 99%   BMI 46.38 kg/m²   General appearance: alert and cooperative with exam  HEENT: Head: Normocephalic, no lesions, without obvious abnormality.  Eye: Normal external eye, conjunctiva, lids cornea, HEAVENLY.  Nose: Normal external nose, mucus membranes and septum.  Neck: no adenopathy, no carotid bruit, and supple, symmetrical, trachea midline  Lungs: clear to auscultation bilaterally  Heart: regular rate and rhythm and S1, S2 normal  Abdomen: soft, non-tender; bowel sounds normal; no masses,  no organomegaly and obese  Extremities:  Left lower leg with edema and erythema that has significantly improved.   to palpation which is worse over the medial calf.    Neurologic: Mental status: Alert, oriented, thought content appropriate    ----------------------------------------------------------------------------------------------------------------------  Medical Decision Making (MDM) Data:    External documents reviewed:  -  My CXR interpretation: -  My EKG interpretation: -  Discussed with:  -  Tests considered but not ordered:  -  Heart score:  -  Social Determinants of Health that impact treatment or disposition:  -    Assessment and Plan:   Left lower leg cellulitis - Improving on IV Rocephin 2 g daily with Vancomycin with Pharmacy managing.  CT of the left lower leg negative for possible abscess.  Venous US negative for DVT.  Tylenol, Ibuprofen, and Tramadol ordered for pain.    Dehydration - Improved on IV Normal Saline.  Hyponatremia - stable at 129 despite IV Normal Saline.    DM II - diet controlled.  Placed on Humalog sliding scale.   HTN - controlled on Lisinopril.    Hyperlipidemia - controlled on Lipitor.      Plan:  Saline Lock IV since patient appears well hydrated.  ACE wrap left lower leg to decrease edema.  Repeat labs in am.    Possible discharge tomorrow if he continues to do well.      Differential Diagnosis:  -  Condition is

## 2024-01-03 NOTE — PROGRESS NOTES
Comprehensive Nutrition Assessment    Type and Reason for Visit:  Initial    Nutrition Recommendations/Plan:   Encourage consistent carbohydrates  Discourage added salt      Malnutrition Assessment:  Malnutrition Status:  No malnutrition (01/03/24 1247)    Context:  Acute Illness     Findings of the 6 clinical characteristics of malnutrition:  Energy Intake:  No significant decrease in energy intake  Weight Loss:  No significant weight loss     Body Fat Loss:  No significant body fat loss     Muscle Mass Loss:  No significant muscle mass loss    Fluid Accumulation:  Mild Extremities   Strength:  Not Performed    Nutrition Assessment:    Altered nutrition related labs r/t endocrine dysfunction, AEB glucose excursions with infection and known diabetes. Some loose bm on probiotic and antibiotic tx. Has had diabetes education in past and admittedly only \"silvia watches\" intakes. States he still has educational materials at home. PO intakes fairly good currently. Discouraged salt use upon visit with edema. Encouraged to call with diabetes questions or consider a refresher with spouse present.    Nutrition Related Findings:    loose bm, +1 BLE edema Wound Type: None       Current Nutrition Intake & Therapies:    Average Meal Intake: %  Average Supplements Intake: None Ordered  ADULT DIET; Regular; 4 carb choices (60 gm/meal)    Anthropometric Measures:  Height: 190.5 cm (6' 3\")  Ideal Body Weight (IBW): 196 lbs (89 kg)    Admission Body Weight: 169.2 kg (373 lb)  Current Body Weight: 168.3 kg (371 lb 1.6 oz), 189.3 % IBW. Weight Source: Bed Scale  Current BMI (kg/m2): 46.4  Usual Body Weight: 169.6 kg (374 lb)  % Weight Change (Calculated): -0.8  Weight Adjustment For: No Adjustment                 BMI Categories: Obese Class 3 (BMI 40.0 or greater)    Estimated Daily Nutrient Needs:  Energy Requirements Based On: Kcal/kg  Weight Used for Energy Requirements: Current  Energy (kcal/day): 4618-7097 (11-15)  Weight

## 2024-01-03 NOTE — CONSULTS
Pharmacy Vancomycin Consult     Vancomycin Day: 2  Current Dosin mg IV every 8 hours  Current indication: SSTI, suspected MRSA    Temp max:  99.7    Recent Labs     24  0456 24  0455   BUN 22* 16   CREATININE 1.1 0.9   WBC 15.1* 13.1*       Intake/Output Summary (Last 24 hours) at 1/3/2024 1049  Last data filed at 1/3/2024 0511  Gross per 24 hour   Intake 4717.76 ml   Output 550 ml   Net 4167.76 ml     Culture Date      Source                       Results                      Viral                          Negative                          Blood                       No growth 2 days    Ht Readings from Last 1 Encounters:   24 1.905 m (6' 3\")        Wt Readings from Last 1 Encounters:   24 (!) 168.3 kg (371 lb 1.6 oz)       Body mass index is 46.38 kg/m².    Estimated Creatinine Clearance: 160 mL/min (based on SCr of 0.9 mg/dL).    Random level: 14.2 (1/3/24 at 0455)    Assessment/Plan:  Random vancomycin level resulted a sub-therapeutic AUC this AM, given new patient parameters at this time.   Will increase vancomycin dosing to 1250 mg IV every 8 hours, which predicts a more appropriate AUC= 422 mg/L/hr and steady state trough concentration= 11.8 mg/L using Bayesian modeling  Will continue to monitor BUN/sCr at least every 48 hours x at least 3 levels, then at least weekly   Repeat Vancomycin level ordered for  @ 0600. Will use bayesian method for dosing.  This level will not be a trough.  Target AUC/CANDY: 400-600   Pharmacy will continue to monitor and adjust therapy as indicated.     Vancomycin Target Concentration Parameters  Treatment  Population Target AUC/CANDY Target Trough   Invasive MRSA Infection (bacteremia, pneumonia, meningitis, endocarditis, osteomyelitis)  Sepsis (undifferentiated) 400-600 N/A   Infection due to non-MRSA pathogen  Empiric treatment of non-invasive MRSA infection  (SSTI, UTI) <500 10-15 mg/L   CrCl < 29 mL/min  Rapidly fluctuating serum

## 2024-01-03 NOTE — PLAN OF CARE
Problem: Discharge Planning  Goal: Discharge to home or other facility with appropriate resources  1/2/2024 2349 by Lidya Key RN  Outcome: Progressing  1/2/2024 1625 by Trell Hayes RN  Outcome: Progressing  Flowsheets (Taken 1/2/2024 1624)  Discharge to home or other facility with appropriate resources:   Identify barriers to discharge with patient and caregiver   Identify discharge learning needs (meds, wound care, etc)     Problem: Pain  Goal: Verbalizes/displays adequate comfort level or baseline comfort level  Outcome: Progressing     Problem: Safety - Adult  Goal: Free from fall injury  1/2/2024 2349 by Lidya Key RN  Outcome: Progressing  1/2/2024 1625 by Trell Hayes RN  Outcome: Progressing  Flowsheets (Taken 1/2/2024 1623)  Free From Fall Injury: Instruct family/caregiver on patient safety     Problem: ABCDS Injury Assessment  Goal: Absence of physical injury  Outcome: Progressing  Flowsheets (Taken 1/2/2024 1623 by Trell Hayes RN)  Absence of Physical Injury: Implement safety measures based on patient assessment     Problem: Respiratory - Adult  Goal: Achieves optimal ventilation and oxygenation  Outcome: Progressing     Problem: Cardiovascular - Adult  Goal: Maintains optimal cardiac output and hemodynamic stability  1/2/2024 2349 by Lidya Key RN  Outcome: Progressing  1/2/2024 1625 by Trell Hayes RN  Outcome: Progressing  Flowsheets (Taken 1/2/2024 1624)  Maintains optimal cardiac output and hemodynamic stability: Monitor blood pressure and heart rate  Goal: Absence of cardiac dysrhythmias or at baseline  Outcome: Progressing     Problem: Skin/Tissue Integrity - Adult  Goal: Skin integrity remains intact  1/2/2024 2349 by Lidya Key RN  Outcome: Progressing  1/2/2024 1625 by Trell Hayes RN  Outcome: Progressing  Flowsheets (Taken 1/2/2024 1623)  Skin Integrity Remains Intact: Monitor for areas of redness and/or skin breakdown  Goal: Incisions, wounds,  or drain sites healing without S/S of infection  Outcome: Progressing  Goal: Oral mucous membranes remain intact  Outcome: Progressing     Problem: Musculoskeletal - Adult  Goal: Return mobility to safest level of function  Outcome: Progressing  Goal: Maintain proper alignment of affected body part  Outcome: Progressing  Goal: Return ADL status to a safe level of function  Outcome: Progressing     Problem: Gastrointestinal - Adult  Goal: Minimal or absence of nausea and vomiting  Outcome: Progressing  Goal: Maintains or returns to baseline bowel function  Outcome: Progressing  Goal: Maintains adequate nutritional intake  1/2/2024 2349 by Lidya Key RN  Outcome: Progressing  1/2/2024 1625 by Trell Hayes, RN  Outcome: Progressing  Flowsheets (Taken 1/2/2024 1624)  Maintains adequate nutritional intake: Monitor percentage of each meal consumed     Problem: Genitourinary - Adult  Goal: Absence of urinary retention  Outcome: Progressing     Problem: Infection - Adult  Goal: Absence of fever/infection during anticipated neutropenic period  Outcome: Progressing     Problem: Metabolic/Fluid and Electrolytes - Adult  Goal: Electrolytes maintained within normal limits  1/2/2024 2349 by Lidya Key RN  Outcome: Progressing  1/2/2024 1625 by Trell Hayes, RN  Outcome: Progressing  Flowsheets (Taken 1/2/2024 1624)  Electrolytes maintained within normal limits: Monitor labs and assess patient for signs and symptoms of electrolyte imbalances  Goal: Hemodynamic stability and optimal renal function maintained  Outcome: Progressing  Goal: Glucose maintained within prescribed range  Outcome: Progressing     Problem: Chronic Conditions and Co-morbidities  Goal: Patient's chronic conditions and co-morbidity symptoms are monitored and maintained or improved  Outcome: Progressing

## 2024-01-04 LAB
ANION GAP SERPL CALCULATED.3IONS-SCNC: 8 MMOL/L (ref 9–17)
BASOPHILS # BLD: 0.03 K/UL (ref 0–0.2)
BASOPHILS NFR BLD: 0 % (ref 0–2)
BUN SERPL-MCNC: 13 MG/DL (ref 6–20)
BUN/CREAT SERPL: 14 (ref 9–20)
CALCIUM SERPL-MCNC: 8.6 MG/DL (ref 8.6–10.4)
CHLORIDE SERPL-SCNC: 95 MMOL/L (ref 98–107)
CO2 SERPL-SCNC: 27 MMOL/L (ref 20–31)
CREAT SERPL-MCNC: 0.9 MG/DL (ref 0.7–1.2)
DATE LAST DOSE: NORMAL
EOSINOPHIL # BLD: 0.15 K/UL (ref 0–0.4)
EOSINOPHILS RELATIVE PERCENT: 1 % (ref 0–5)
ERYTHROCYTE [DISTWIDTH] IN BLOOD BY AUTOMATED COUNT: 13.1 % (ref 12.1–15.2)
GFR SERPL CREATININE-BSD FRML MDRD: >60 ML/MIN/1.73M2
GLUCOSE BLD-MCNC: 139 MG/DL (ref 65–99)
GLUCOSE BLD-MCNC: 143 MG/DL (ref 65–99)
GLUCOSE BLD-MCNC: 153 MG/DL (ref 65–99)
GLUCOSE BLD-MCNC: 195 MG/DL (ref 65–99)
GLUCOSE SERPL-MCNC: 143 MG/DL (ref 70–99)
HCT VFR BLD AUTO: 35.3 % (ref 41–53)
HGB BLD-MCNC: 12.2 G/DL (ref 13.5–17.5)
IMM GRANULOCYTES # BLD AUTO: 0.04 K/UL (ref 0–0.3)
IMM GRANULOCYTES NFR BLD: 0 % (ref 0–5)
LYMPHOCYTES NFR BLD: 1.28 K/UL (ref 1–4.8)
LYMPHOCYTES RELATIVE PERCENT: 11 % (ref 13–44)
MAGNESIUM SERPL-MCNC: 2.3 MG/DL (ref 1.6–2.6)
MCH RBC QN AUTO: 29.6 PG (ref 26–34)
MCHC RBC AUTO-ENTMCNC: 34.6 G/DL (ref 31–37)
MCV RBC AUTO: 85.7 FL (ref 80–100)
MONOCYTES NFR BLD: 0.82 K/UL (ref 0–1)
MONOCYTES NFR BLD: 7 % (ref 5–9)
NEUTROPHILS NFR BLD: 81 % (ref 39–75)
NEUTS SEG NFR BLD: 9.01 K/UL (ref 2.1–6.5)
PLATELET # BLD AUTO: 110 K/UL (ref 140–450)
PMV BLD AUTO: 9.3 FL (ref 6–12)
POTASSIUM SERPL-SCNC: 3.5 MMOL/L (ref 3.7–5.3)
RBC # BLD AUTO: 4.12 M/UL (ref 4.5–5.9)
SODIUM SERPL-SCNC: 130 MMOL/L (ref 135–144)
TME LAST DOSE: 538 H
VANCOMYCIN DOSE: 1250 MG
VANCOMYCIN SERPL-MCNC: 23.7 UG/ML
WBC OTHER # BLD: 11.3 K/UL (ref 3.5–11)

## 2024-01-04 PROCEDURE — 1200000000 HC SEMI PRIVATE

## 2024-01-04 PROCEDURE — 85025 COMPLETE CBC W/AUTO DIFF WBC: CPT

## 2024-01-04 PROCEDURE — 6360000002 HC RX W HCPCS: Performed by: INTERNAL MEDICINE

## 2024-01-04 PROCEDURE — 80202 ASSAY OF VANCOMYCIN: CPT

## 2024-01-04 PROCEDURE — 83735 ASSAY OF MAGNESIUM: CPT

## 2024-01-04 PROCEDURE — 2580000003 HC RX 258: Performed by: INTERNAL MEDICINE

## 2024-01-04 PROCEDURE — 36415 COLL VENOUS BLD VENIPUNCTURE: CPT

## 2024-01-04 PROCEDURE — 82947 ASSAY GLUCOSE BLOOD QUANT: CPT

## 2024-01-04 PROCEDURE — 6370000000 HC RX 637 (ALT 250 FOR IP): Performed by: INTERNAL MEDICINE

## 2024-01-04 PROCEDURE — 94761 N-INVAS EAR/PLS OXIMETRY MLT: CPT

## 2024-01-04 PROCEDURE — 80048 BASIC METABOLIC PNL TOTAL CA: CPT

## 2024-01-04 RX ORDER — POTASSIUM CHLORIDE 750 MG/1
40 TABLET, FILM COATED, EXTENDED RELEASE ORAL ONCE
Status: COMPLETED | OUTPATIENT
Start: 2024-01-04 | End: 2024-01-04

## 2024-01-04 RX ADMIN — Medication 1 CAPSULE: at 17:44

## 2024-01-04 RX ADMIN — ENOXAPARIN SODIUM 40 MG: 100 INJECTION SUBCUTANEOUS at 20:42

## 2024-01-04 RX ADMIN — CEFTRIAXONE SODIUM 2000 MG: 2 INJECTION, POWDER, FOR SOLUTION INTRAMUSCULAR; INTRAVENOUS at 18:56

## 2024-01-04 RX ADMIN — ZOLPIDEM TARTRATE 5 MG: 5 TABLET ORAL at 20:42

## 2024-01-04 RX ADMIN — POTASSIUM CHLORIDE 40 MEQ: 750 TABLET, FILM COATED, EXTENDED RELEASE ORAL at 08:54

## 2024-01-04 RX ADMIN — VANCOMYCIN HYDROCHLORIDE 1000 MG: 1 INJECTION, POWDER, LYOPHILIZED, FOR SOLUTION INTRAVENOUS at 17:49

## 2024-01-04 RX ADMIN — Medication 1 CAPSULE: at 08:54

## 2024-01-04 RX ADMIN — LISINOPRIL 20 MG: 20 TABLET ORAL at 08:54

## 2024-01-04 RX ADMIN — IBUPROFEN 200 MG: 200 TABLET, FILM COATED ORAL at 15:24

## 2024-01-04 RX ADMIN — ATORVASTATIN CALCIUM 80 MG: 40 TABLET, FILM COATED ORAL at 08:54

## 2024-01-04 RX ADMIN — VANCOMYCIN HYDROCHLORIDE 1000 MG: 1 INJECTION, POWDER, LYOPHILIZED, FOR SOLUTION INTRAVENOUS at 22:23

## 2024-01-04 RX ADMIN — VANCOMYCIN HYDROCHLORIDE 1000 MG: 1 INJECTION, POWDER, LYOPHILIZED, FOR SOLUTION INTRAVENOUS at 10:52

## 2024-01-04 RX ADMIN — SODIUM CHLORIDE, PRESERVATIVE FREE 10 ML: 5 INJECTION INTRAVENOUS at 08:54

## 2024-01-04 RX ADMIN — ENOXAPARIN SODIUM 40 MG: 100 INJECTION SUBCUTANEOUS at 08:54

## 2024-01-04 RX ADMIN — SODIUM CHLORIDE, PRESERVATIVE FREE 10 ML: 5 INJECTION INTRAVENOUS at 20:43

## 2024-01-04 RX ADMIN — VANCOMYCIN HYDROCHLORIDE 1250 MG: 500 INJECTION, POWDER, LYOPHILIZED, FOR SOLUTION INTRAVENOUS at 04:02

## 2024-01-04 ASSESSMENT — PAIN SCALES - GENERAL
PAINLEVEL_OUTOF10: 3
PAINLEVEL_OUTOF10: 0
PAINLEVEL_OUTOF10: 0

## 2024-01-04 ASSESSMENT — PAIN DESCRIPTION - DESCRIPTORS: DESCRIPTORS: SHOOTING

## 2024-01-04 ASSESSMENT — PAIN DESCRIPTION - ORIENTATION: ORIENTATION: LEFT

## 2024-01-04 ASSESSMENT — PAIN DESCRIPTION - LOCATION: LOCATION: LEG

## 2024-01-04 ASSESSMENT — PAIN - FUNCTIONAL ASSESSMENT: PAIN_FUNCTIONAL_ASSESSMENT: PREVENTS OR INTERFERES SOME ACTIVE ACTIVITIES AND ADLS

## 2024-01-04 NOTE — PROGRESS NOTES
Hospitalist Progress Note  1/4/2024 7:04 AM  Subjective:   Admit Date: 1/1/2024  PCP: Woody Fraga, APRN - CNP    Interval History: Viral feels he is doing better but still having a lot of pain with any pressure on the left leg.  Low grade temperature yesterday.  Tolerating the ACE wrap on the leg.  No chest pain or SOB.  Appetite is good.    Diet: ADULT DIET; Regular; 4 carb choices (60 gm/meal)  Medications:   Scheduled Meds:   vancomycin  1,250 mg IntraVENous Q8H    atorvastatin  80 mg Oral Daily    lisinopril  20 mg Oral Daily    sodium chloride flush  5-40 mL IntraVENous 2 times per day    enoxaparin  40 mg SubCUTAneous BID    cefTRIAXone (ROCEPHIN) IV  2,000 mg IntraVENous Q24H    insulin lispro  0-4 Units SubCUTAneous TID WC    insulin lispro  0-4 Units SubCUTAneous Nightly    lactobacillus  1 capsule Oral BID WC    vancomycin (VANCOCIN) intermittent dosing (placeholder)   Other RX Placeholder     Continuous Infusions:   sodium chloride      dextrose         Patient's current medications documented, reviewed, and updated.      CBC:   Recent Labs     01/02/24  0456 01/03/24  0455 01/04/24  0621   WBC 15.1* 13.1* 11.3*   HGB 13.2* 12.6* 12.2*   * 112* 110*     BMP:    Recent Labs     01/01/24  1429 01/02/24  0456 01/03/24  0455   * 129* 129*   K 3.8 3.7 3.8   CL 92* 96* 98   CO2 28 24 25   BUN 21* 22* 16   CREATININE 1.2 1.1 0.9   GLUCOSE 163* 167* 179*     Hepatic:   Recent Labs     01/01/24  1429   AST 33   ALT 28   BILITOT 2.4*   ALKPHOS 63     Troponin: No results for input(s): \"TROPONINI\" in the last 72 hours.  BNP: No results for input(s): \"BNP\" in the last 72 hours.  Lipids: No results for input(s): \"CHOL\", \"HDL\" in the last 72 hours.    Invalid input(s): \"LDLCALCU\"  INR: No results for input(s): \"INR\" in the last 72 hours.      Objective:   Vitals: BP (!) 110/54   Pulse 74   Temp 98.7 °F (37.1 °C) (Oral)   Resp 16   Ht 1.905 m (6' 3\")   Wt (!) 168.3 kg (371 lb 1.6 oz)   SpO2 99%  severe exacerbation, progression or side effects of treatment:  -    Shared decision making:  -    Code status and discussions:  Full    Medical Necessity:  In patient is appropriate for this patient secondary to the need for IV antibiotics.        DVT prophylaxis:   [x] Lovenox   [] SCDs   [] SQ Heparin   [] Encourage ambulation, low risk for DVT, no chemical or mechanical    prophylaxis necessary      [] Already on Anticoagulation    Patient Active Problem List:     Recurrent cellulitis of lower leg     Essential hypertension     Cervical disc herniation     Radiculopathy     Type 2 diabetes mellitus without complication, without long-term current use of insulin (HCC)     Family history of colon cancer in father     Mixed hyperlipidemia     Cellulitis of left lower leg      Clifford Gomez MD, MD  Rounding Hospitalist

## 2024-01-04 NOTE — PROGRESS NOTES
Vancomycin Dosing by Pharmacy - Daily Note   Vancomycin Therapy Day:  3  Indication: SSTI    Allergies:  Bactrim   Actual Weight:    Wt Readings from Last 1 Encounters:   01/01/24 (!) 168.3 kg (371 lb 1.6 oz)       Labs/Ancillary Data  Estimated Creatinine Clearance: 160 mL/min (based on SCr of 0.9 mg/dL).  Recent Labs     01/02/24  0456 01/03/24  0455 01/04/24  0621   CREATININE 1.1 0.9 0.9   BUN 22* 16 13   WBC 15.1* 13.1* 11.3*     No results found for: \"PROCAL\"    Intake/Output Summary (Last 24 hours) at 1/4/2024 0742  Last data filed at 1/4/2024 0408  Gross per 24 hour   Intake 1304.87 ml   Output 750 ml   Net 554.87 ml     Temp: 99.7    Culture Date / Source  /  Results  1/1                    Blood        No growth 3 days  12/31                Viral          Negative      Recent vancomycin administrations                     vancomycin (VANCOCIN) 1,250 mg in sodium chloride 0.9 % 250 mL IVPB (mg) 1,250 mg New Bag 01/04/24 0402     1,250 mg New Bag 01/03/24 2055     1,250 mg New Bag  1204    vancomycin (VANCOCIN) 750 mg in sodium chloride 0.9 % 250 mL IVPB (mg) 750 mg New Bag 01/03/24 0310     750 mg New Bag 01/02/24 1849     750 mg New Bag  1151    vancomycin (VANCOCIN) 1,250 mg in sodium chloride 0.9 % 250 mL IVPB (mg) 1,250 mg New Bag 01/02/24 0310    vancomycin (VANCOCIN) 2,000 mg in sodium chloride 0.9 % 500 mL IVPB (mg) 2,000 mg New Bag 01/01/24 1519                    PLAN   Random vancomycin level shows a sub-therapeutic trough (9.1), AUC was 408.   Will increase dose to 1000 mg IV every 6 hours, which predicts an AUC of 435 mg/L/hr and trough of 11.7 mg/L using Bayesian modeling  No repeat random Vancomycin level ordered as patient discharged is planned for tomorrow. Pharmacy will continue to monitor and adjust therapy as indicated until discharge.      Vancomycin Target Concentration Parameters  Treatment  Population Target AUC/CANDY Target Trough   Invasive MRSA Infection (bacteremia, pneumonia,

## 2024-01-05 VITALS
WEIGHT: 315 LBS | BODY MASS INDEX: 39.17 KG/M2 | TEMPERATURE: 98.2 F | OXYGEN SATURATION: 97 % | HEART RATE: 89 BPM | DIASTOLIC BLOOD PRESSURE: 70 MMHG | SYSTOLIC BLOOD PRESSURE: 120 MMHG | HEIGHT: 75 IN | RESPIRATION RATE: 20 BRPM

## 2024-01-05 LAB — GLUCOSE BLD-MCNC: 159 MG/DL (ref 65–99)

## 2024-01-05 PROCEDURE — 6370000000 HC RX 637 (ALT 250 FOR IP): Performed by: INTERNAL MEDICINE

## 2024-01-05 PROCEDURE — 2580000003 HC RX 258: Performed by: INTERNAL MEDICINE

## 2024-01-05 PROCEDURE — 94761 N-INVAS EAR/PLS OXIMETRY MLT: CPT

## 2024-01-05 PROCEDURE — 6360000002 HC RX W HCPCS: Performed by: INTERNAL MEDICINE

## 2024-01-05 PROCEDURE — 82947 ASSAY GLUCOSE BLOOD QUANT: CPT

## 2024-01-05 RX ORDER — CEPHALEXIN 500 MG/1
500 CAPSULE ORAL 4 TIMES DAILY
Qty: 12 CAPSULE | Refills: 0 | Status: SHIPPED | OUTPATIENT
Start: 2024-01-05 | End: 2024-01-15

## 2024-01-05 RX ORDER — BISACODYL 10 MG
10 SUPPOSITORY, RECTAL RECTAL ONCE
Status: COMPLETED | OUTPATIENT
Start: 2024-01-05 | End: 2024-01-05

## 2024-01-05 RX ORDER — DOXYCYCLINE HYCLATE 100 MG
100 TABLET ORAL 2 TIMES DAILY
Qty: 20 TABLET | Refills: 0 | Status: SHIPPED | OUTPATIENT
Start: 2024-01-05 | End: 2024-01-15

## 2024-01-05 RX ADMIN — SODIUM CHLORIDE, PRESERVATIVE FREE 10 ML: 5 INJECTION INTRAVENOUS at 08:43

## 2024-01-05 RX ADMIN — ATORVASTATIN CALCIUM 80 MG: 40 TABLET, FILM COATED ORAL at 08:42

## 2024-01-05 RX ADMIN — VANCOMYCIN HYDROCHLORIDE 1000 MG: 1 INJECTION, POWDER, LYOPHILIZED, FOR SOLUTION INTRAVENOUS at 08:48

## 2024-01-05 RX ADMIN — VANCOMYCIN HYDROCHLORIDE 1000 MG: 1 INJECTION, POWDER, LYOPHILIZED, FOR SOLUTION INTRAVENOUS at 04:30

## 2024-01-05 RX ADMIN — ENOXAPARIN SODIUM 40 MG: 100 INJECTION SUBCUTANEOUS at 08:42

## 2024-01-05 RX ADMIN — LISINOPRIL 20 MG: 20 TABLET ORAL at 08:42

## 2024-01-05 RX ADMIN — Medication 1 CAPSULE: at 08:42

## 2024-01-05 RX ADMIN — ACETAMINOPHEN 650 MG: 325 TABLET, FILM COATED ORAL at 08:42

## 2024-01-05 RX ADMIN — BISACODYL 10 MG: 10 SUPPOSITORY RECTAL at 08:42

## 2024-01-05 ASSESSMENT — PAIN DESCRIPTION - LOCATION: LOCATION: LEG

## 2024-01-05 ASSESSMENT — PAIN DESCRIPTION - DESCRIPTORS: DESCRIPTORS: BURNING;STABBING

## 2024-01-05 ASSESSMENT — PAIN DESCRIPTION - ORIENTATION: ORIENTATION: LEFT

## 2024-01-05 ASSESSMENT — PAIN - FUNCTIONAL ASSESSMENT: PAIN_FUNCTIONAL_ASSESSMENT: PREVENTS OR INTERFERES WITH MANY ACTIVE NOT PASSIVE ACTIVITIES

## 2024-01-05 ASSESSMENT — PAIN SCALES - GENERAL: PAINLEVEL_OUTOF10: 3

## 2024-01-05 NOTE — PROGRESS NOTES
Discharged with documented belongings, demonstrates understanding.  IV removed and belongings signed for. Out to private vehicle via wheelchair.

## 2024-01-05 NOTE — DISCHARGE INSTRUCTIONS
Discharge Instructions    Admission Date:  1/1/2024  Discharge Date:  1/5/24    Disposition:   Home.    Discharge Instructions:  Resume previous home medication.  Resume taking Keflex 500 mg at home but increase to 4 times a day (a prescription was sent [facility] the [facility]).  Continue for 10 days.  Take Doxycycline 100 mg two times a day x 10 days.  Keep left leg elevated as much as possible and wrap with ACE wraps or use a knee high compression stocking (20 to 30 mmHg pressure) daily.   Activity:  As tolerated.  Diet:  Diabetic diet.     The following appointments:  [facility]cho (ordered from ED visit on 12/31) Mon Jan 8 @ 1pm.   Ryder Esquivel Jan 11 @ 9am.

## 2024-01-05 NOTE — PROGRESS NOTES
Hospitalist Progress Note  1/5/2024 6:15 AM  Subjective:   Admit Date: 1/1/2024  PCP: Woody Fraga, APRN - CNP    Interval History: Viral continues to improve.  He states he can do more with his leg without the severe pain.  Fever has resolved.  No chest pain or SOB.  Appetite is good, no nausea. Bowels did not move yesterday. No trouble urinating.      Diet: ADULT DIET; Regular; 4 carb choices (60 gm/meal)  Medications:   Scheduled Meds:   vancomycin  1,000 mg IntraVENous Q6H    atorvastatin  80 mg Oral Daily    lisinopril  20 mg Oral Daily    sodium chloride flush  5-40 mL IntraVENous 2 times per day    enoxaparin  40 mg SubCUTAneous BID    cefTRIAXone (ROCEPHIN) IV  2,000 mg IntraVENous Q24H    insulin lispro  0-4 Units SubCUTAneous TID WC    insulin lispro  0-4 Units SubCUTAneous Nightly    lactobacillus  1 capsule Oral BID WC    vancomycin (VANCOCIN) intermittent dosing (placeholder)   Other RX Placeholder     Continuous Infusions:   sodium chloride      dextrose         Patient's current medications documented, reviewed, and updated.      CBC:   Recent Labs     01/03/24  0455 01/04/24  0621   WBC 13.1* 11.3*   HGB 12.6* 12.2*   * 110*     BMP:    Recent Labs     01/03/24  0455 01/04/24  0621   * 130*   K 3.8 3.5*   CL 98 95*   CO2 25 27   BUN 16 13   CREATININE 0.9 0.9   GLUCOSE 179* 143*     Hepatic: No results for input(s): \"AST\", \"ALT\", \"ALB\", \"BILITOT\", \"ALKPHOS\" in the last 72 hours.  Troponin: No results for input(s): \"TROPONINI\" in the last 72 hours.  BNP: No results for input(s): \"BNP\" in the last 72 hours.  Lipids: No results for input(s): \"CHOL\", \"HDL\" in the last 72 hours.    Invalid input(s): \"LDLCALCU\"  INR: No results for input(s): \"INR\" in the last 72 hours.      Objective:   Vitals: /68   Pulse 69   Temp 97.1 °F (36.2 °C) (Temporal)   Resp 16   Ht 1.905 m (6' 3\")   Wt (!) 169.2 kg (373 lb)   SpO2 100%   BMI 46.62 kg/m²   General appearance: alert and cooperative  with exam  HEENT: Head: Normocephalic, no lesions, without obvious abnormality.  Eye: Normal external eye, conjunctiva, lids cornea, HEAVENLY.  Nose: Normal external nose, mucus membranes and septum.  Neck: no adenopathy, no carotid bruit, and supple, symmetrical, trachea midline  Lungs: clear to auscultation bilaterally  Heart: regular rate and rhythm and S1, S2 normal  Abdomen: soft, non-tender; bowel sounds normal; no masses,  no organomegaly and obese  Extremities:  Right leg with continued decrease in erythema.  Not as tender to palpation.  Neurologic: Mental status: Alert, oriented, thought content appropriate    ----------------------------------------------------------------------------------------------------------------------  Medical Decision Making (MDM) Data:    External documents reviewed:  -  My CXR interpretation: -  My EKG interpretation: -  Discussed with:  -  Tests considered but not ordered:  -  Heart score:  -  Social Determinants of Health that impact treatment or disposition:  -    Assessment and Plan:   Left lower leg cellulitis - Improving on IV Rocephin 2 g daily with Vancomycin with Pharmacy managing.  CT of the left lower leg negative for possible abscess.  Venous US negative for DVT.  Tylenol, Ibuprofen, and Tramadol ordered for pain.    Dehydration - Improved on IV Normal Saline.  Hyponatremia - stable at 130.  Hypokalemia - replaced.  DM II - diet controlled.  Placed on Humalog sliding scale.   HTN - controlled on Lisinopril.    Hyperlipidemia - controlled on Lipitor.    Constipation - Dulcolax suppository ordered for 1/5.    Plan:  Discharge home on oral antibiotics.  Encouraged to keep leg elevated with ACE wrap on.  Will give Dulcolax suppository this am before discharging home.    Differential Diagnosis:  -  Condition is improving / unchanged / worsening:  Improving  Condition is at treatment goal:  Yes  Chronic condition is / is not having mild / moderate, severe exacerbation,  progression or side effects of treatment:  -    Shared decision making:  -    Code status and discussions:  Full        DVT prophylaxis:   [x] Lovenox   [] SCDs   [] SQ Heparin   [] Encourage ambulation, low risk for DVT, no chemical or mechanical    prophylaxis necessary      [] Already on Anticoagulation    Patient Active Problem List:     Recurrent cellulitis of lower leg     Essential hypertension     Cervical disc herniation     Radiculopathy     Type 2 diabetes mellitus without complication, without long-term current use of insulin (HCC)     Family history of colon cancer in father     Mixed hyperlipidemia     Cellulitis of left lower leg        Clifford Gomez MD, MD  [facility] Hospitalist

## 2024-01-05 NOTE — DISCHARGE SUMMARY
Hospitalist Discharge Summary    Derek Kang  :  1970  MRN:  [id number]    Admit date:  2024  Discharge date:  24     Admitting Physician:  Clifford Gomez MD    Discharge Diagnoses:   Left lower leg cellulitis - improving.      Dehydration - resolved.      Hyponatremia.      Hypokalemia - replaced.      DM II      Hyperlipidemia.      Obesity    Admission Condition:  poor      Discharged Condition:  good    Hospital Course:     The patient is a 53 y.o. male who presents to the ER with worsening left lower leg redness, swelling, and pain.  According to Viral who has a history of HTN, Hyperlipidemia, and diet controlled DM II, he started with left foot swelling approximately 2 weeks ago.  Gradually the swelling extended up into his left lower leg with an increase in redness.  He states the pain gradually started to become painful which was started about a week ago.  Around that time, he started to have low grade temperatures and not feeling well.  Viral has a history of cellulitis in the right leg several years ago so  he knew this was starting.  The pain was much worse with any pressure on the left foot but would get a little better with walking around.    He states when he got up he felt a little more SOB but felt this was secondary to pain.  On 23, the pain and redness worsened and he came to the ER for further evaluation. CTA of the chest was negative for PE.   Viral was discharged on oral Keflex 500 mg three times a day with a line drawn around the area of erythema.  He was told to return to the ER if the redness progressed passed this line.  Viral states despite taking the antibiotic, the swelling, pain, and redness worsened.  With this he returned to the ER.  In the ER his CMP was normal other than a Sodium of 128, Chloride 92, BUN 21, Glucose 163, and Total bilirubin at 2.4.  CBC showed a WBC at 16.7, Hgb 14.4, and Plt ct at 124.  Blood culture was drawn.   With out patient failure on oral  antibiotics, Viral was admitted for IV antibiotics, IV hydration, and close monitoring of condition.      Viral was admitted on IV Rocephin 2 g daily with IV Vancomycin with Pharmacy managing.  CT scan of the left lower leg did not show an abscess.  A venous US was negative for DVT.  Ibuprofen and Tramadol was ordered for pain.  Lovenox was given for DVT prophylaxis.  IV Normal saline was ordered for dehydration.  Potassium was replaced as needed.  With IV antibiotics his fever resolved and WBC improved.  The left leg was ACE wrapped daily to decrease swelling.  The erythema significantly improved at time of discharge.  Viral was placed on a Humalog sliding scale during his admission for diabetic management.  With improvement it was felt Viral could be discharged on oral antibiotics with close out patient follow up with  his primary care provider.         Discharge Exam:    Vitals: /68   Pulse 69   Temp 97.1 °F (36.2 °C) (Temporal)   Resp 16   Ht 1.905 m (6' 3\")   Wt (!) 169.2 kg (373 lb)   SpO2 100%   BMI 46.62 kg/m²   General appearance: alert and cooperative with exam  HEENT: Head: Normocephalic, no lesions, without obvious abnormality.  Eye: Normal external eye, conjunctiva, lids cornea, HEAVENLY.  Nose: Normal external nose, mucus membranes and septum.  Neck: no adenopathy, no carotid bruit, and supple, symmetrical, trachea midline  Lungs: clear to auscultation bilaterally  Heart: regular rate and rhythm and S1, S2 normal  Abdomen: soft, non-tender; bowel sounds normal; no masses,  no organomegaly and obese  Extremities:  Right leg with continued decrease in erythema.  Not as tender to palpation.  Neurologic: Mental status: Alert, oriented, thought content appropriate    Discharge Medications:         Medication List        START taking these medications      doxycycline hyclate 100 MG tablet  Commonly known as: VIBRA-TABS  Take 1 tablet by mouth 2 times daily for 10 days            CHANGE how you take  how you take these medications      cephALEXin 500 MG capsule  Commonly known as: Keflex  Take 1 capsule by mouth 4 times daily for 10 days  What changed: when to take this            CONTINUE taking these medications      atorvastatin 80 MG tablet  Commonly known as: LIPITOR  TAKE 1 TABLET BY MOUTH DAILY     blood glucose monitor kit and supplies  Please dispense glucometer per patient's insurance.     blood glucose test strips  Please check blood glucose daily, dispense per patient's insurance     CPAP Machine Misc     Lancets Misc  1 each by Does not apply route 2 times daily Please dispense per patient's insurance     lisinopril 20 MG tablet  Commonly known as: PRINIVIL;ZESTRIL  Take 1 tablet by mouth daily               Where to Get Your Medications        These medications were sent to [email or web address] - [place], [place] - [address] [place] [address] -  [phone] - F [phone]  [address] [place] [address]      Phone: [phone]   cephALEXin 500 MG capsule  doxycycline hyclate 100 MG tablet         Consults:  Pharmacy to manage Vancomycin.     Significant Diagnostic Studies:  Labs, CT left leg, Venous US left leg, COVID, Influenza, CXR, ECG.    Treatments:   IV Rocephin / Vancomycin, ACE wrap left leg, Lovenox for DVT prophylaxis, Ibuprofen / Tramadol for pain, IV Normal Saline.    Disposition:   Home.    Discharge Instructions:  Resume previous home medication.  Resume taking Keflex 500 mg at home but increase to 4 times a day (a prescription was sent for the extra pills needed to [facility]).  Continue for 10 days.  Take Doxycycline 100 mg two times a day x 10 days.  Keep left leg elevated as much as possible and wrap with ACE wraps or use a knee high compression stocking (20 to 30 mmHg pressure) daily.   Activity:  As tolerated.  Diet:  Diabetic diet.     The following appointments:  Echo (ordered from ED visit on 12/31) Mon Jan 8 @ 1pm.   Ryder Esquivel Jan 11 @ 9am.     Discharge time =  35

## 2024-01-07 LAB
MICROORGANISM SPEC CULT: NORMAL
SERVICE CMNT-IMP: NORMAL
SPECIMEN DESCRIPTION: NORMAL

## 2024-01-08 ENCOUNTER — HOSPITAL ENCOUNTER (OUTPATIENT)
Age: 54
Discharge: HOME | End: 2024-01-10
Attending: FAMILY MEDICINE
Payer: COMMERCIAL

## 2024-01-08 VITALS — HEIGHT: 75 IN | BODY MASS INDEX: 39.17 KG/M2 | WEIGHT: 315 LBS

## 2024-01-08 DIAGNOSIS — R06.02 SHORTNESS OF BREATH: ICD-10-CM

## 2024-01-08 DIAGNOSIS — R93.89 ABNORMAL FINDING ON CHEST XRAY: ICD-10-CM

## 2024-01-08 LAB
ECHO AO ASC DIAM: 2.8 CM
ECHO AO ASCENDING AORTA INDEX: 0.98 CM/M2
ECHO AO ROOT DIAM: 3 CM
ECHO AO ROOT INDEX: 1.05 CM/M2
ECHO AV AREA PEAK VELOCITY: 2.3 CM2
ECHO AV AREA/BSA PEAK VELOCITY: 0.8 CM2/M2
ECHO AV CUSP MM: 2.3 CM
ECHO AV PEAK GRADIENT: 8 MMHG
ECHO AV PEAK VELOCITY: 1.4 M/S
ECHO AV VELOCITY RATIO: 0.57
ECHO BSA: 2.99 M2
ECHO EST RA PRESSURE: 5 MMHG
ECHO LA DIAMETER INDEX: 1.57 CM/M2
ECHO LA DIAMETER: 4.5 CM
ECHO LA TO AORTIC ROOT RATIO: 1.5
ECHO LV E' LATERAL VELOCITY: 12 CM/S
ECHO LV EF PHYSICIAN: 50 %
ECHO LV INTERNAL DIMENSION DIASTOLIC MMODE: 6 CM (ref 4.2–5.9)
ECHO LV INTERNAL DIMENSION SYSTOLIC MMODE: 4.3 CM
ECHO LV IVSD MMODE: 1.5 CM (ref 0.6–1)
ECHO LV IVSS MMODE: 1.6 CM
ECHO LV POSTERIOR WALL DIASTOLIC MMODE: 1.1 CM (ref 0.6–1)
ECHO LV POSTERIOR WALL SYSTOLIC MMODE: 1.5 CM
ECHO LVOT AREA: 4.2 CM2
ECHO LVOT DIAM: 2.3 CM
ECHO LVOT MEAN GRADIENT: 2 MMHG
ECHO LVOT PEAK GRADIENT: 2 MMHG
ECHO LVOT PEAK VELOCITY: 0.8 M/S
ECHO LVOT STROKE VOLUME INDEX: 23.1 ML/M2
ECHO LVOT SV: 66 ML
ECHO LVOT VTI: 15.9 CM
ECHO MV A VELOCITY: 0.58 M/S
ECHO MV AREA PHT: 3.3 CM2
ECHO MV E DECELERATION TIME (DT): 233 MS
ECHO MV E VELOCITY: 0.95 M/S
ECHO MV E/A RATIO: 1.64
ECHO MV E/E' LATERAL: 7.92
ECHO MV PRESSURE HALF TIME (PHT): 67.6 MS
ECHO PV MAX VELOCITY: 1.2 M/S
ECHO PV PEAK GRADIENT: 6 MMHG
ECHO RIGHT VENTRICULAR SYSTOLIC PRESSURE (RVSP): 16 MMHG
ECHO TV REGURGITANT MAX VELOCITY: 1.65 M/S
ECHO TV REGURGITANT PEAK GRADIENT: 11 MMHG

## 2024-01-08 PROCEDURE — 93306 TTE W/DOPPLER COMPLETE: CPT | Performed by: INTERNAL MEDICINE

## 2024-01-08 PROCEDURE — 93306 TTE W/DOPPLER COMPLETE: CPT

## 2024-01-08 NOTE — TELEPHONE ENCOUNTER
Care Transitions Initial Follow Up Call    Outreach made within 2 business days of discharge: Yes    Patient: Derek Kang Patient : 1970   MRN: [id number]  Reason for Admission: Cellulitis of left lower leg.  Discharge Date: 24       Spoke with: Left Message     Discharge department/facility:[facility]         Scheduled appointment with PCP within 7-14 days    Follow Up  Future Appointments   Date Time Provider Department Center   2024  1:00 PM MW [facility]CHO 1 [facility]   2024  9:00 AM Clingman, Woody A, APRN - CNP [facility]   2024  6:00 AM Clingman, Woody A, APRN - CNP [facility]       Suellen Garcia MA

## 2024-01-08 NOTE — TELEPHONE ENCOUNTER
Care Transitions Initial Follow Up Call    Outreach made within 2 business days of discharge: yes    Patient: Derek Kang Patient : 1970   MRN: [id number]  Reason for Admission: Cellulitis left lower leg.    Discharge Date: 24       Spoke with: left message for patient     Discharge department/facility: [facility]       Scheduled appointment with PCP within 7-14 days    Follow Up  Future Appointments   Date Time Provider Department Center   2024  9:00 AM Clingman, Woody A, APRN - CNP [facility]   2024  6:00 AM Clingman, Woody A, APRN - CNP [facility]       Suellen Garcia MA

## 2024-01-11 ENCOUNTER — OFFICE VISIT (OUTPATIENT)
Dept: FAMILY MEDICINE CLINIC | Age: 54
End: 2024-01-11

## 2024-01-11 ENCOUNTER — TELEPHONE (OUTPATIENT)
Dept: FAMILY MEDICINE CLINIC | Age: 54
End: 2024-01-11

## 2024-01-11 VITALS
TEMPERATURE: 97.4 F | WEIGHT: 315 LBS | BODY MASS INDEX: 46.37 KG/M2 | OXYGEN SATURATION: 97 % | HEART RATE: 82 BPM | SYSTOLIC BLOOD PRESSURE: 112 MMHG | DIASTOLIC BLOOD PRESSURE: 66 MMHG

## 2024-01-11 DIAGNOSIS — Z09 HOSPITAL DISCHARGE FOLLOW-UP: ICD-10-CM

## 2024-01-11 DIAGNOSIS — S81.802D WOUND OF LEFT LOWER EXTREMITY, SUBSEQUENT ENCOUNTER: ICD-10-CM

## 2024-01-11 DIAGNOSIS — L03.116 CELLULITIS OF LEFT LOWER EXTREMITY: Primary | ICD-10-CM

## 2024-01-11 ASSESSMENT — PATIENT HEALTH QUESTIONNAIRE - PHQ9
2. FEELING DOWN, DEPRESSED OR HOPELESS: 0
1. LITTLE INTEREST OR PLEASURE IN DOING THINGS: 2
SUM OF ALL RESPONSES TO PHQ QUESTIONS 1-9: 2
SUM OF ALL RESPONSES TO PHQ9 QUESTIONS 1 & 2: 2

## 2024-01-11 NOTE — PROGRESS NOTES
painful. He continues to take antibiotics as directed. Has noticed that redness is still present. Has also developed some skin sloughing and darkened areas. Denies any fever.         Inpatient course: Discharge summary reviewed- see chart.    Interval history/Current status:     Patient Active Problem List   Diagnosis    Recurrent cellulitis of lower leg    Essential hypertension    Cervical disc herniation    Radiculopathy    Type 2 diabetes mellitus without complication, without long-term current use of insulin (HCC)    Family history of colon cancer in father    Mixed hyperlipidemia    Cellulitis of left lower leg       Medications listed as ordered at the time of discharge from hospital     Medication List            Accurate as of January 11, 2024 11:08 AM. If you have any questions, ask your nurse or doctor.                CONTINUE taking these medications      atorvastatin 80 MG tablet  Commonly known as: LIPITOR  TAKE 1 TABLET BY MOUTH DAILY     blood glucose monitor kit and supplies  Please dispense glucometer per patient's insurance.     blood glucose test strips  Please check blood glucose daily, dispense per patient's insurance     cephALEXin 500 MG capsule  Commonly known as: Keflex  Take 1 capsule by mouth 4 times daily for 10 days     CPAP Machine Misc     doxycycline hyclate 100 MG tablet  Commonly known as: VIBRA-TABS  Take 1 tablet by mouth 2 times daily for 10 days     Lancets Misc  1 each by Does not apply route 2 times daily Please dispense per patient's insurance     lisinopril 20 MG tablet  Commonly known as: PRINIVIL;ZESTRIL  Take 1 tablet by mouth daily                Medications marked \"taking\" at this time  Outpatient Medications Marked as Taking for the 1/11/24 encounter (Office Visit) with Woody Fraga APRN - CNP   Medication Sig Dispense Refill    cephALEXin (KEFLEX) 500 MG capsule Take 1 capsule by mouth 4 times daily for 10 days 12 capsule 0    doxycycline hyclate (VIBRA-TABS)

## 2024-01-11 NOTE — TELEPHONE ENCOUNTER
I spoke with Dr Rodriguez regarding pt's case. Dr Rodriguez will see pt today, 1/11/2024 at 1700 in the [place] location. Please call pt and let him know. This will be at the [facility] location.

## 2024-01-15 ENCOUNTER — TELEPHONE (OUTPATIENT)
Dept: FAMILY MEDICINE CLINIC | Age: 54
End: 2024-01-15

## 2024-01-15 DIAGNOSIS — S81.802D WOUND OF LEFT LOWER EXTREMITY, SUBSEQUENT ENCOUNTER: ICD-10-CM

## 2024-01-15 DIAGNOSIS — L03.116 CELLULITIS OF LEFT LOWER EXTREMITY: Primary | ICD-10-CM

## 2024-01-15 NOTE — TELEPHONE ENCOUNTER
Cancel referral to Dr. Rodriguez.  New referral for podiatry signed.  Please let patient know that podiatry office will call him.  They will squeeze him in this week.

## 2024-01-15 NOTE — TELEPHONE ENCOUNTER
Patient called about referral to Dr. Rodriguez. Next available appointment is February 1st 2024.   Please advise

## 2024-01-25 ENCOUNTER — OFFICE VISIT (OUTPATIENT)
Dept: FAMILY MEDICINE CLINIC | Age: 54
End: 2024-01-25
Payer: COMMERCIAL

## 2024-01-25 VITALS
SYSTOLIC BLOOD PRESSURE: 122 MMHG | TEMPERATURE: 97.9 F | OXYGEN SATURATION: 97 % | BODY MASS INDEX: 46.12 KG/M2 | WEIGHT: 315 LBS | DIASTOLIC BLOOD PRESSURE: 70 MMHG | HEART RATE: 86 BPM

## 2024-01-25 DIAGNOSIS — L03.116 CELLULITIS OF LEFT LOWER LEG: Primary | ICD-10-CM

## 2024-01-25 PROCEDURE — 99213 OFFICE O/P EST LOW 20 MIN: CPT | Performed by: NURSE PRACTITIONER

## 2024-01-25 PROCEDURE — 3017F COLORECTAL CA SCREEN DOC REV: CPT | Performed by: NURSE PRACTITIONER

## 2024-01-25 PROCEDURE — G8484 FLU IMMUNIZE NO ADMIN: HCPCS | Performed by: NURSE PRACTITIONER

## 2024-01-25 PROCEDURE — G8427 DOCREV CUR MEDS BY ELIG CLIN: HCPCS | Performed by: NURSE PRACTITIONER

## 2024-01-25 PROCEDURE — G8417 CALC BMI ABV UP PARAM F/U: HCPCS | Performed by: NURSE PRACTITIONER

## 2024-01-25 PROCEDURE — 1111F DSCHRG MED/CURRENT MED MERGE: CPT | Performed by: NURSE PRACTITIONER

## 2024-01-25 PROCEDURE — 3074F SYST BP LT 130 MM HG: CPT | Performed by: NURSE PRACTITIONER

## 2024-01-25 PROCEDURE — 3078F DIAST BP <80 MM HG: CPT | Performed by: NURSE PRACTITIONER

## 2024-01-25 PROCEDURE — 1036F TOBACCO NON-USER: CPT | Performed by: NURSE PRACTITIONER

## 2024-01-25 ASSESSMENT — ENCOUNTER SYMPTOMS
SHORTNESS OF BREATH: 0
VOMITING: 0
DIARRHEA: 0
NAUSEA: 0

## 2024-01-25 NOTE — PROGRESS NOTES
HPI Notes    Name: Derek Kang  : 1970         Chief Complaint:     Chief Complaint   Patient presents with    Cellulitis     Patient here today for 2 week follow up on cellulitis left lower leg. Patient did see  and follows up next week       History of Present Illness:        HPI  Pt is a 54 yo male who presents for follow up. Pt was seen on 24 for hospital follow up after being admitted for cellulitis of left lower extremity. Pt was referred to podiatry for wound debridement. Pt was given an ointment. Pt states that he is much improved. Antibiotics are completed. Still having some swelling.     Past Medical History:     Past Medical History:   Diagnosis Date    Arthritis     Hypertension     on meds x 2yrs    Mixed hyperlipidemia 2023    Type 2 diabetes mellitus without complication, without long-term current use of insulin (HCC) 10/26/2021    Unspecified sleep apnea     CPAP nightly use      Reviewed all health maintenance requirements and ordered appropriate tests  Health Maintenance Due   Topic Date Due    Hepatitis B vaccine (1 of 3 - 3-dose series) Never done    Pneumococcal 0-64 years Vaccine (1 - PCV) Never done    HIV screen  Never done    Hepatitis C screen  Never done    Shingles vaccine (1 of 2) Never done    Flu vaccine (1) 2023    COVID-19 Vaccine ( season) 2023    Lipids  2023       Past Surgical History:     Past Surgical History:   Procedure Laterality Date    CERVICAL FUSION N/A 2019    A.C.D.F. C 6-7 performed [place] Cortez [place] [facility] [place]    COLONOSCOPY N/A 2022    COLONOSCOPY POLYPECTOMY HOT BIOPSY performed by Peter Loomis MD at [facility] ENDOSCOPY    FRACTURE SURGERY Left     left hip surgical repair post fx    HIP SURGERY Left     plates    JOINT REPLACEMENT Left     hip    TONSILLECTOMY          Medications:       Prior to Admission medications    Medication Sig Start Date End Date Taking? Authorizing Provider

## 2024-01-25 NOTE — PATIENT INSTRUCTIONS
SURVEY:    You may be receiving a survey from [facility] regarding your visit today.    Please complete the survey to enable us to provide the highest quality of care to you and your family.    If you cannot score us a very good (5 Stars) on any question, please call the office to discuss how we could have made your experience a very good one.    Thank you.    Clinical Care Team: NICOL Maya-INES Fischer LPN    Clerical Team: Jill Mckeon

## 2024-04-07 DIAGNOSIS — I10 ESSENTIAL HYPERTENSION: ICD-10-CM

## 2024-04-08 RX ORDER — ATORVASTATIN CALCIUM 80 MG/1
80 TABLET, FILM COATED ORAL DAILY
Qty: 90 TABLET | Refills: 1 | Status: SHIPPED | OUTPATIENT
Start: 2024-04-08

## 2024-04-08 RX ORDER — LISINOPRIL 20 MG/1
20 TABLET ORAL DAILY
Qty: 90 TABLET | Refills: 1 | Status: SHIPPED | OUTPATIENT
Start: 2024-04-08

## 2024-04-08 NOTE — TELEPHONE ENCOUNTER
Last OV 1/25/24    Next OV 5/7/24    Requesting [facility]   Rx's pending

## 2024-05-07 ENCOUNTER — OFFICE VISIT (OUTPATIENT)
Dept: FAMILY MEDICINE CLINIC | Age: 54
End: 2024-05-07
Payer: COMMERCIAL

## 2024-05-07 VITALS
SYSTOLIC BLOOD PRESSURE: 126 MMHG | OXYGEN SATURATION: 96 % | HEIGHT: 75 IN | DIASTOLIC BLOOD PRESSURE: 84 MMHG | BODY MASS INDEX: 39.17 KG/M2 | HEART RATE: 71 BPM | WEIGHT: 315 LBS

## 2024-05-07 DIAGNOSIS — I10 ESSENTIAL HYPERTENSION: ICD-10-CM

## 2024-05-07 DIAGNOSIS — E78.2 MIXED HYPERLIPIDEMIA: ICD-10-CM

## 2024-05-07 DIAGNOSIS — E11.9 TYPE 2 DIABETES MELLITUS WITHOUT COMPLICATION, WITHOUT LONG-TERM CURRENT USE OF INSULIN (HCC): Primary | ICD-10-CM

## 2024-05-07 DIAGNOSIS — R60.0 LOWER LEG EDEMA: ICD-10-CM

## 2024-05-07 LAB — HBA1C MFR BLD: 7.5 %

## 2024-05-07 PROCEDURE — 2022F DILAT RTA XM EVC RTNOPTHY: CPT | Performed by: NURSE PRACTITIONER

## 2024-05-07 PROCEDURE — 83036 HEMOGLOBIN GLYCOSYLATED A1C: CPT | Performed by: NURSE PRACTITIONER

## 2024-05-07 PROCEDURE — G8427 DOCREV CUR MEDS BY ELIG CLIN: HCPCS | Performed by: NURSE PRACTITIONER

## 2024-05-07 PROCEDURE — G8417 CALC BMI ABV UP PARAM F/U: HCPCS | Performed by: NURSE PRACTITIONER

## 2024-05-07 PROCEDURE — 3074F SYST BP LT 130 MM HG: CPT | Performed by: NURSE PRACTITIONER

## 2024-05-07 PROCEDURE — 99213 OFFICE O/P EST LOW 20 MIN: CPT | Performed by: NURSE PRACTITIONER

## 2024-05-07 PROCEDURE — 3079F DIAST BP 80-89 MM HG: CPT | Performed by: NURSE PRACTITIONER

## 2024-05-07 PROCEDURE — 1036F TOBACCO NON-USER: CPT | Performed by: NURSE PRACTITIONER

## 2024-05-07 PROCEDURE — 3051F HG A1C>EQUAL 7.0%<8.0%: CPT | Performed by: NURSE PRACTITIONER

## 2024-05-07 PROCEDURE — 3017F COLORECTAL CA SCREEN DOC REV: CPT | Performed by: NURSE PRACTITIONER

## 2024-05-07 RX ORDER — METFORMIN HYDROCHLORIDE 500 MG/1
500 TABLET, EXTENDED RELEASE ORAL
Qty: 30 TABLET | Refills: 2 | Status: SHIPPED | OUTPATIENT
Start: 2024-05-07

## 2024-05-07 ASSESSMENT — ENCOUNTER SYMPTOMS
COUGH: 0
DIARRHEA: 0
VISUAL CHANGE: 0
SHORTNESS OF BREATH: 0
NAUSEA: 0
VOMITING: 0

## 2024-05-07 NOTE — PROGRESS NOTES
plates    JOINT REPLACEMENT Left 1997    hip    TONSILLECTOMY          Medications:       Prior to Admission medications    Medication Sig Start Date End Date Taking? Authorizing Provider   metFORMIN (GLUCOPHAGE-XR) 500 MG extended release tablet Take 1 tablet by mouth daily (with breakfast) 5/7/24  Yes Woody Fraga DNP   atorvastatin (LIPITOR) 80 MG tablet Take 1 tablet by mouth daily 4/8/24  Yes Woody Fraga DNP   lisinopril (PRINIVIL;ZESTRIL) 20 MG tablet Take 1 tablet by mouth daily 4/8/24  Yes Woody Fraga DNP   blood glucose monitor kit and supplies Please dispense glucometer per patient's insurance. 2/7/22  Yes Woody Fraga DNP   Lancets MISC 1 each by Does not apply route 2 times daily Please dispense per patient's insurance 2/7/22  Yes Woody Fraga DNP   blood glucose monitor strips Please check blood glucose daily, dispense per patient's insurance 2/7/22  Yes Woody Fraga DNP   CPAP Machine MISC 1 Units by Does not apply route nightly   Yes Provider, Raheem, MD        Allergies:       Bactrim    Social History:     Tobacco:    reports that he quit smoking about 9 years ago. His smoking use included cigarettes. He started smoking about 11 years ago. He has a 0.5 pack-year smoking history. He has been exposed to tobacco smoke. He has never used smokeless tobacco.  Alcohol:      reports current alcohol use.  Drug Use:  reports no history of drug use.    Family History:     Family History   Problem Relation Age of Onset    Cancer Father         colon    High Blood Pressure Father     Asthma Mother     No Known Problems Sister     No Known Problems Brother     Mental Illness Daughter         depression, anxiety       Review of Systems:         Review of Systems   Constitutional:  Negative for chills, fever and weight loss.   Respiratory:  Negative for cough and shortness of breath.    Cardiovascular:  Negative for chest pain and palpitations.

## 2024-07-20 ENCOUNTER — APPOINTMENT (OUTPATIENT)
Dept: GENERAL RADIOLOGY | Age: 54
End: 2024-07-20
Payer: COMMERCIAL

## 2024-07-20 ENCOUNTER — HOSPITAL ENCOUNTER (EMERGENCY)
Age: 54
Discharge: ANOTHER HOSPITAL | End: 2024-07-20
Attending: FAMILY MEDICINE
Payer: COMMERCIAL

## 2024-07-20 ENCOUNTER — HOSPITAL ENCOUNTER (INPATIENT)
Age: 54
LOS: 4 days | Discharge: HOME | End: 2024-07-25
Attending: EMERGENCY MEDICINE | Admitting: STUDENT IN AN ORGANIZED HEALTH CARE EDUCATION/TRAINING PROGRAM
Payer: COMMERCIAL

## 2024-07-20 VITALS
SYSTOLIC BLOOD PRESSURE: 113 MMHG | RESPIRATION RATE: 25 BRPM | DIASTOLIC BLOOD PRESSURE: 61 MMHG | HEART RATE: 86 BPM | TEMPERATURE: 99.8 F | BODY MASS INDEX: 39.17 KG/M2 | WEIGHT: 315 LBS | HEIGHT: 75 IN | OXYGEN SATURATION: 91 %

## 2024-07-20 DIAGNOSIS — L03.116 CELLULITIS OF LEFT LOWER EXTREMITY: Primary | ICD-10-CM

## 2024-07-20 DIAGNOSIS — R65.21 SEPTIC SHOCK (HCC): ICD-10-CM

## 2024-07-20 DIAGNOSIS — A41.9 SEPTIC SHOCK (HCC): ICD-10-CM

## 2024-07-20 DIAGNOSIS — R65.21 SEPTIC SHOCK (HCC): Primary | ICD-10-CM

## 2024-07-20 DIAGNOSIS — A41.9 SEPTIC SHOCK (HCC): Primary | ICD-10-CM

## 2024-07-20 DIAGNOSIS — L03.116 CELLULITIS OF LEFT LOWER EXTREMITY: ICD-10-CM

## 2024-07-20 LAB
-: ABNORMAL
ALBUMIN SERPL-MCNC: 4.4 G/DL (ref 3.5–5.2)
ALP SERPL-CCNC: 79 U/L (ref 40–129)
ALT SERPL-CCNC: 50 U/L (ref 5–41)
ANION GAP SERPL CALCULATED.3IONS-SCNC: 14 MMOL/L (ref 9–17)
AST SERPL-CCNC: 35 U/L
BASOPHILS # BLD: ABNORMAL K/UL (ref 0–0.2)
BASOPHILS NFR BLD: ABNORMAL % (ref 0–2)
BILIRUB SERPL-MCNC: 2.5 MG/DL (ref 0.3–1.2)
BILIRUB UR QL STRIP: NEGATIVE
BNP SERPL-MCNC: 45 PG/ML
BUN SERPL-MCNC: 25 MG/DL (ref 6–20)
CALCIUM SERPL-MCNC: 10.1 MG/DL (ref 8.6–10.4)
CASTS #/AREA URNS LPF: ABNORMAL /LPF
CHLORIDE SERPL-SCNC: 94 MMOL/L (ref 98–107)
CLARITY UR: CLEAR
CO2 SERPL-SCNC: 24 MMOL/L (ref 20–31)
COLOR UR: ABNORMAL
COMMENT: ABNORMAL
CREAT SERPL-MCNC: 1.1 MG/DL (ref 0.7–1.2)
D DIMER PPP FEU-MCNC: 0.48 UG/ML FEU (ref 0–0.59)
EKG ATRIAL RATE: 104 BPM
EKG P AXIS: 43 DEGREES
EKG P-R INTERVAL: 128 MS
EKG Q-T INTERVAL: 342 MS
EKG QRS DURATION: 126 MS
EKG QTC CALCULATION (BAZETT): 449 MS
EKG R AXIS: -56 DEGREES
EKG T AXIS: 48 DEGREES
EKG VENTRICULAR RATE: 104 BPM
EOSINOPHIL # BLD: ABNORMAL K/UL (ref 0–0.4)
EOSINOPHILS RELATIVE PERCENT: ABNORMAL % (ref 0–5)
ERYTHROCYTE [DISTWIDTH] IN BLOOD BY AUTOMATED COUNT: 12.7 % (ref 12.1–15.2)
GFR, ESTIMATED: 81 ML/MIN/1.73M2
GLUCOSE SERPL-MCNC: 162 MG/DL (ref 70–99)
GLUCOSE UR STRIP-MCNC: ABNORMAL MG/DL
HCT VFR BLD AUTO: 44.6 % (ref 41–53)
HGB BLD-MCNC: 15.7 G/DL (ref 13.5–17.5)
HGB UR QL STRIP.AUTO: ABNORMAL
IMM GRANULOCYTES # BLD AUTO: ABNORMAL K/UL (ref 0–0.3)
IMM GRANULOCYTES NFR BLD: ABNORMAL %
INR PPP: 1
KETONES UR STRIP-MCNC: NEGATIVE MG/DL
LACTATE BLDV-SCNC: 1.6 MMOL/L (ref 0.5–2.2)
LACTATE BLDV-SCNC: 2 MMOL/L (ref 0.5–2.2)
LACTATE BLDV-SCNC: 2.4 MMOL/L (ref 0.5–2.2)
LACTIC ACID, WHOLE BLOOD: 3.1 MMOL/L (ref 0.7–2.1)
LEUKOCYTE ESTERASE UR QL STRIP: NEGATIVE
LYMPHOCYTES NFR BLD: 1.22 K/UL (ref 1–4.8)
LYMPHOCYTES RELATIVE PERCENT: 7 % (ref 13–44)
MCH RBC QN AUTO: 30.7 PG (ref 26–34)
MCHC RBC AUTO-ENTMCNC: 35.2 G/DL (ref 31–37)
MCV RBC AUTO: 87.1 FL (ref 80–100)
MONOCYTES NFR BLD: 0.87 K/UL (ref 0–1)
MONOCYTES NFR BLD: 5 % (ref 5–9)
MORPHOLOGY: ABNORMAL
MUCOUS THREADS URNS QL MICRO: ABNORMAL
NEUTROPHILS NFR BLD: 88 % (ref 39–75)
NEUTS SEG NFR BLD: 15.31 K/UL (ref 2.1–6.5)
NITRITE UR QL STRIP: NEGATIVE
PARTIAL THROMBOPLASTIN TIME: 24.7 SEC (ref 23.9–33.8)
PH UR STRIP: 6 [PH] (ref 5–8)
PLATELET # BLD AUTO: 163 K/UL (ref 140–450)
PMV BLD AUTO: 8.7 FL (ref 6–12)
POTASSIUM SERPL-SCNC: 4.5 MMOL/L (ref 3.7–5.3)
PROT SERPL-MCNC: 7.9 G/DL (ref 6.4–8.3)
PROT UR STRIP-MCNC: ABNORMAL MG/DL
PROTHROMBIN TIME: 12.9 SEC (ref 11.5–14.2)
RBC # BLD AUTO: 5.12 M/UL (ref 4.5–5.9)
RBC #/AREA URNS HPF: ABNORMAL /HPF (ref 0–2)
SODIUM SERPL-SCNC: 132 MMOL/L (ref 135–144)
SP GR UR STRIP: 1.01 (ref 1–1.03)
TROPONIN I SERPL HS-MCNC: 13 NG/L (ref 0–22)
UROBILINOGEN UR STRIP-ACNC: NORMAL EU/DL (ref 0–1)
WBC #/AREA URNS HPF: ABNORMAL /HPF
WBC OTHER # BLD: 17.4 K/UL (ref 3.5–11)

## 2024-07-20 PROCEDURE — 6360000002 HC RX W HCPCS: Performed by: FAMILY MEDICINE

## 2024-07-20 PROCEDURE — 84484 ASSAY OF TROPONIN QUANT: CPT

## 2024-07-20 PROCEDURE — 93005 ELECTROCARDIOGRAM TRACING: CPT

## 2024-07-20 PROCEDURE — 2500000003 HC RX 250 WO HCPCS: Performed by: FAMILY MEDICINE

## 2024-07-20 PROCEDURE — 96365 THER/PROPH/DIAG IV INF INIT: CPT

## 2024-07-20 PROCEDURE — 2500000003 HC RX 250 WO HCPCS

## 2024-07-20 PROCEDURE — 99285 EMERGENCY DEPT VISIT HI MDM: CPT

## 2024-07-20 PROCEDURE — 96374 THER/PROPH/DIAG INJ IV PUSH: CPT

## 2024-07-20 PROCEDURE — 83605 ASSAY OF LACTIC ACID: CPT

## 2024-07-20 PROCEDURE — 96367 TX/PROPH/DG ADDL SEQ IV INF: CPT

## 2024-07-20 PROCEDURE — 96375 TX/PRO/DX INJ NEW DRUG ADDON: CPT

## 2024-07-20 PROCEDURE — 93005 ELECTROCARDIOGRAM TRACING: CPT | Performed by: FAMILY MEDICINE

## 2024-07-20 PROCEDURE — 81001 URINALYSIS AUTO W/SCOPE: CPT

## 2024-07-20 PROCEDURE — 71045 X-RAY EXAM CHEST 1 VIEW: CPT

## 2024-07-20 PROCEDURE — 85025 COMPLETE CBC W/AUTO DIFF WBC: CPT

## 2024-07-20 PROCEDURE — 36415 COLL VENOUS BLD VENIPUNCTURE: CPT

## 2024-07-20 PROCEDURE — 83880 ASSAY OF NATRIURETIC PEPTIDE: CPT

## 2024-07-20 PROCEDURE — 2580000003 HC RX 258

## 2024-07-20 PROCEDURE — 85610 PROTHROMBIN TIME: CPT

## 2024-07-20 PROCEDURE — 85730 THROMBOPLASTIN TIME PARTIAL: CPT

## 2024-07-20 PROCEDURE — 6370000000 HC RX 637 (ALT 250 FOR IP)

## 2024-07-20 PROCEDURE — 96361 HYDRATE IV INFUSION ADD-ON: CPT

## 2024-07-20 PROCEDURE — 87040 BLOOD CULTURE FOR BACTERIA: CPT

## 2024-07-20 PROCEDURE — 6370000000 HC RX 637 (ALT 250 FOR IP): Performed by: FAMILY MEDICINE

## 2024-07-20 PROCEDURE — 96366 THER/PROPH/DIAG IV INF ADDON: CPT

## 2024-07-20 PROCEDURE — 80053 COMPREHEN METABOLIC PANEL: CPT

## 2024-07-20 PROCEDURE — 2580000003 HC RX 258: Performed by: FAMILY MEDICINE

## 2024-07-20 PROCEDURE — 85379 FIBRIN DEGRADATION QUANT: CPT

## 2024-07-20 PROCEDURE — 83036 HEMOGLOBIN GLYCOSYLATED A1C: CPT

## 2024-07-20 RX ORDER — 0.9 % SODIUM CHLORIDE 0.9 %
1000 INTRAVENOUS SOLUTION INTRAVENOUS ONCE
Status: COMPLETED | OUTPATIENT
Start: 2024-07-20 | End: 2024-07-20

## 2024-07-20 RX ORDER — NOREPINEPHRINE BITARTRATE 0.06 MG/ML
1-100 INJECTION, SOLUTION INTRAVENOUS CONTINUOUS
Status: DISCONTINUED | OUTPATIENT
Start: 2024-07-20 | End: 2024-07-20

## 2024-07-20 RX ORDER — IBUPROFEN 400 MG/1
400 TABLET ORAL ONCE
Status: COMPLETED | OUTPATIENT
Start: 2024-07-20 | End: 2024-07-20

## 2024-07-20 RX ORDER — 0.9 % SODIUM CHLORIDE 0.9 %
1000 INTRAVENOUS SOLUTION INTRAVENOUS ONCE
Status: COMPLETED | OUTPATIENT
Start: 2024-07-21 | End: 2024-07-21

## 2024-07-20 RX ORDER — ACETAMINOPHEN 500 MG
1000 TABLET ORAL ONCE
Status: COMPLETED | OUTPATIENT
Start: 2024-07-20 | End: 2024-07-20

## 2024-07-20 RX ORDER — NOREPINEPHRINE BITARTRATE 0.06 MG/ML
1-100 INJECTION, SOLUTION INTRAVENOUS CONTINUOUS
Status: DISCONTINUED | OUTPATIENT
Start: 2024-07-20 | End: 2024-07-20 | Stop reason: HOSPADM

## 2024-07-20 RX ORDER — ONDANSETRON 2 MG/ML
4 INJECTION INTRAMUSCULAR; INTRAVENOUS ONCE
Status: DISCONTINUED | OUTPATIENT
Start: 2024-07-20 | End: 2024-07-20

## 2024-07-20 RX ORDER — NOREPINEPHRINE BITARTRATE 0.06 MG/ML
1-100 INJECTION, SOLUTION INTRAVENOUS CONTINUOUS
Status: DISCONTINUED | OUTPATIENT
Start: 2024-07-21 | End: 2024-07-22

## 2024-07-20 RX ORDER — ONDANSETRON 2 MG/ML
4 INJECTION INTRAMUSCULAR; INTRAVENOUS ONCE
Status: COMPLETED | OUTPATIENT
Start: 2024-07-20 | End: 2024-07-20

## 2024-07-20 RX ORDER — SODIUM CHLORIDE 9 MG/ML
INJECTION, SOLUTION INTRAVENOUS CONTINUOUS
Status: DISCONTINUED | OUTPATIENT
Start: 2024-07-20 | End: 2024-07-21

## 2024-07-20 RX ADMIN — SODIUM CHLORIDE 1000 ML: 9 INJECTION, SOLUTION INTRAVENOUS at 18:15

## 2024-07-20 RX ADMIN — SODIUM CHLORIDE 1000 ML: 9 INJECTION, SOLUTION INTRAVENOUS at 17:00

## 2024-07-20 RX ADMIN — ONDANSETRON 4 MG: 2 INJECTION INTRAMUSCULAR; INTRAVENOUS at 14:20

## 2024-07-20 RX ADMIN — ACETAMINOPHEN 1000 MG: 500 TABLET, FILM COATED ORAL at 17:25

## 2024-07-20 RX ADMIN — SODIUM CHLORIDE: 9 INJECTION, SOLUTION INTRAVENOUS at 23:33

## 2024-07-20 RX ADMIN — Medication 7 MCG/MIN: at 20:26

## 2024-07-20 RX ADMIN — IBUPROFEN 400 MG: 400 TABLET, FILM COATED ORAL at 23:42

## 2024-07-20 RX ADMIN — Medication 5 MCG/MIN: at 19:23

## 2024-07-20 RX ADMIN — SODIUM CHLORIDE 1000 ML: 9 INJECTION, SOLUTION INTRAVENOUS at 16:06

## 2024-07-20 RX ADMIN — VANCOMYCIN HYDROCHLORIDE 1000 MG: 1 INJECTION, POWDER, LYOPHILIZED, FOR SOLUTION INTRAVENOUS at 15:02

## 2024-07-20 RX ADMIN — CEFTRIAXONE SODIUM 1000 MG: 1 INJECTION, POWDER, FOR SOLUTION INTRAMUSCULAR; INTRAVENOUS at 14:24

## 2024-07-20 RX ADMIN — SODIUM CHLORIDE 1000 ML: 9 INJECTION, SOLUTION INTRAVENOUS at 23:49

## 2024-07-20 RX ADMIN — SODIUM CHLORIDE 1000 ML: 9 INJECTION, SOLUTION INTRAVENOUS at 14:21

## 2024-07-20 RX ADMIN — Medication 5 MCG/MIN: at 23:30

## 2024-07-20 ASSESSMENT — PAIN SCALES - GENERAL
PAINLEVEL_OUTOF10: 6
PAINLEVEL_OUTOF10: 5
PAINLEVEL_OUTOF10: 5

## 2024-07-20 ASSESSMENT — PAIN - FUNCTIONAL ASSESSMENT
PAIN_FUNCTIONAL_ASSESSMENT: 0-10
PAIN_FUNCTIONAL_ASSESSMENT: ACTIVITIES ARE NOT PREVENTED
PAIN_FUNCTIONAL_ASSESSMENT: 0-10

## 2024-07-20 ASSESSMENT — PAIN DESCRIPTION - DESCRIPTORS
DESCRIPTORS: TIGHTNESS
DESCRIPTORS: ACHING

## 2024-07-20 ASSESSMENT — PAIN DESCRIPTION - PAIN TYPE
TYPE: ACUTE PAIN
TYPE: ACUTE PAIN

## 2024-07-20 ASSESSMENT — PAIN DESCRIPTION - LOCATION
LOCATION: HEAD
LOCATION: LEG
LOCATION: LEG

## 2024-07-20 ASSESSMENT — LIFESTYLE VARIABLES
HOW MANY STANDARD DRINKS CONTAINING ALCOHOL DO YOU HAVE ON A TYPICAL DAY: PATIENT DOES NOT DRINK
HOW OFTEN DO YOU HAVE A DRINK CONTAINING ALCOHOL: NEVER
HOW OFTEN DO YOU HAVE A DRINK CONTAINING ALCOHOL: NEVER
HOW MANY STANDARD DRINKS CONTAINING ALCOHOL DO YOU HAVE ON A TYPICAL DAY: PATIENT DOES NOT DRINK

## 2024-07-20 ASSESSMENT — PAIN DESCRIPTION - ORIENTATION: ORIENTATION: LEFT;LOWER

## 2024-07-21 PROBLEM — L03.119 CELLULITIS OF LOWER EXTREMITY: Status: ACTIVE | Noted: 2024-07-21

## 2024-07-21 PROBLEM — R65.21 SEPTIC SHOCK (HCC): Status: ACTIVE | Noted: 2024-07-21

## 2024-07-21 PROBLEM — A41.9 SEPTIC SHOCK (HCC): Status: ACTIVE | Noted: 2024-07-21

## 2024-07-21 LAB
ALBUMIN SERPL-MCNC: 4 G/DL (ref 3.5–5.2)
ALBUMIN/GLOB SERPL: 1 {RATIO} (ref 1–2.5)
ALP SERPL-CCNC: 59 U/L (ref 40–129)
ALT SERPL-CCNC: 38 U/L (ref 10–50)
ANION GAP SERPL CALCULATED.3IONS-SCNC: 11 MMOL/L (ref 9–16)
ANION GAP SERPL CALCULATED.3IONS-SCNC: 14 MMOL/L (ref 9–16)
AST SERPL-CCNC: 37 U/L (ref 10–50)
BACTERIA URNS QL MICRO: NORMAL
BASOPHILS # BLD: 0 K/UL (ref 0–0.2)
BASOPHILS NFR BLD: 0 % (ref 0–2)
BILIRUB DIRECT SERPL-MCNC: 0.3 MG/DL (ref 0–0.2)
BILIRUB SERPL-MCNC: 3.3 MG/DL (ref 0–1.2)
BILIRUB UR QL STRIP: NEGATIVE
BUN SERPL-MCNC: 24 MG/DL (ref 6–20)
BUN SERPL-MCNC: 30 MG/DL (ref 6–20)
CALCIUM SERPL-MCNC: 8.3 MG/DL (ref 8.6–10.4)
CALCIUM SERPL-MCNC: 8.6 MG/DL (ref 8.6–10.4)
CASTS #/AREA URNS LPF: NORMAL /LPF (ref 0–8)
CHLORIDE SERPL-SCNC: 101 MMOL/L (ref 98–107)
CHLORIDE SERPL-SCNC: 104 MMOL/L (ref 98–107)
CLARITY UR: CLEAR
CO2 SERPL-SCNC: 19 MMOL/L (ref 20–31)
CO2 SERPL-SCNC: 20 MMOL/L (ref 20–31)
COLOR UR: YELLOW
CREAT SERPL-MCNC: 1.5 MG/DL (ref 0.7–1.2)
CREAT SERPL-MCNC: 1.7 MG/DL (ref 0.7–1.2)
CRP SERPL HS-MCNC: 173 MG/L (ref 0–5)
EOSINOPHIL # BLD: 0 K/UL (ref 0–0.4)
EOSINOPHILS RELATIVE PERCENT: 0 % (ref 1–4)
EPI CELLS #/AREA URNS HPF: NORMAL /HPF (ref 0–5)
ERYTHROCYTE [DISTWIDTH] IN BLOOD BY AUTOMATED COUNT: 13.5 % (ref 11.8–14.4)
ERYTHROCYTE [SEDIMENTATION RATE] IN BLOOD BY PHOTOMETRIC METHOD: 2 MM/HR (ref 0–20)
GFR, ESTIMATED: 47 ML/MIN/1.73M2
GFR, ESTIMATED: 56 ML/MIN/1.73M2
GLUCOSE BLD-MCNC: 127 MG/DL (ref 75–110)
GLUCOSE BLD-MCNC: 142 MG/DL (ref 75–110)
GLUCOSE BLD-MCNC: 168 MG/DL (ref 75–110)
GLUCOSE BLD-MCNC: 173 MG/DL (ref 75–110)
GLUCOSE BLD-MCNC: 176 MG/DL (ref 75–110)
GLUCOSE SERPL-MCNC: 161 MG/DL (ref 74–99)
GLUCOSE SERPL-MCNC: 168 MG/DL (ref 74–99)
GLUCOSE UR STRIP-MCNC: NEGATIVE MG/DL
HCT VFR BLD AUTO: 42.5 % (ref 40.7–50.3)
HGB BLD-MCNC: 14.1 G/DL (ref 13–17)
HGB UR QL STRIP.AUTO: NEGATIVE
IMM GRANULOCYTES # BLD AUTO: 0 K/UL (ref 0–0.3)
IMM GRANULOCYTES NFR BLD: 0 %
KETONES UR STRIP-MCNC: NEGATIVE MG/DL
LACTIC ACID, WHOLE BLOOD: 1.8 MMOL/L (ref 0.7–2.1)
LACTIC ACID, WHOLE BLOOD: 1.9 MMOL/L (ref 0.7–2.1)
LEUKOCYTE ESTERASE UR QL STRIP: NEGATIVE
LYMPHOCYTES NFR BLD: 1.97 K/UL (ref 1–4.8)
LYMPHOCYTES RELATIVE PERCENT: 9 % (ref 24–44)
MCH RBC QN AUTO: 30.2 PG (ref 25.2–33.5)
MCHC RBC AUTO-ENTMCNC: 33.2 G/DL (ref 28.4–34.8)
MCV RBC AUTO: 91 FL (ref 82.6–102.9)
MICROORGANISM SPEC CULT: NORMAL
MICROORGANISM SPEC CULT: NORMAL
MONOCYTES NFR BLD: 0.66 K/UL (ref 0.1–0.8)
MONOCYTES NFR BLD: 3 % (ref 1–7)
MORPHOLOGY: ABNORMAL
MRSA, DNA, NASAL: NEGATIVE
NEUTROPHILS NFR BLD: 88 % (ref 36–66)
NEUTS SEG NFR BLD: 19.27 K/UL (ref 1.8–7.7)
NITRITE UR QL STRIP: NEGATIVE
NRBC BLD-RTO: 0 PER 100 WBC
PH UR STRIP: 6 [PH] (ref 5–8)
PLATELET # BLD AUTO: 165 K/UL (ref 138–453)
PMV BLD AUTO: 9 FL (ref 8.1–13.5)
POTASSIUM SERPL-SCNC: 4.6 MMOL/L (ref 3.7–5.3)
POTASSIUM SERPL-SCNC: 4.7 MMOL/L (ref 3.7–5.3)
PROT SERPL-MCNC: 6.9 G/DL (ref 6.6–8.7)
PROT UR STRIP-MCNC: NEGATIVE MG/DL
RBC # BLD AUTO: 4.67 M/UL (ref 4.21–5.77)
RBC #/AREA URNS HPF: NORMAL /HPF (ref 0–4)
SERVICE CMNT-IMP: NORMAL
SERVICE CMNT-IMP: NORMAL
SODIUM SERPL-SCNC: 134 MMOL/L (ref 136–145)
SODIUM SERPL-SCNC: 135 MMOL/L (ref 136–145)
SP GR UR STRIP: 1.01 (ref 1–1.03)
SPECIMEN DESCRIPTION: NORMAL
TROPONIN I SERPL HS-MCNC: 18 NG/L (ref 0–22)
TROPONIN I SERPL HS-MCNC: 23 NG/L (ref 0–22)
TSH SERPL DL<=0.05 MIU/L-ACNC: 0.52 UIU/ML (ref 0.27–4.2)
UROBILINOGEN UR STRIP-ACNC: NORMAL EU/DL (ref 0–1)
VANCOMYCIN SERPL-MCNC: 5 UG/ML (ref 5–40)
WBC #/AREA URNS HPF: NORMAL /HPF (ref 0–5)
WBC OTHER # BLD: 21.9 K/UL (ref 3.5–11.3)

## 2024-07-21 PROCEDURE — 84484 ASSAY OF TROPONIN QUANT: CPT

## 2024-07-21 PROCEDURE — 84443 ASSAY THYROID STIM HORMONE: CPT

## 2024-07-21 PROCEDURE — 87641 MR-STAPH DNA AMP PROBE: CPT

## 2024-07-21 PROCEDURE — 99291 CRITICAL CARE FIRST HOUR: CPT | Performed by: INTERNAL MEDICINE

## 2024-07-21 PROCEDURE — 81001 URINALYSIS AUTO W/SCOPE: CPT

## 2024-07-21 PROCEDURE — 6370000000 HC RX 637 (ALT 250 FOR IP)

## 2024-07-21 PROCEDURE — 94761 N-INVAS EAR/PLS OXIMETRY MLT: CPT

## 2024-07-21 PROCEDURE — 2500000003 HC RX 250 WO HCPCS

## 2024-07-21 PROCEDURE — 2580000003 HC RX 258

## 2024-07-21 PROCEDURE — 82248 BILIRUBIN DIRECT: CPT

## 2024-07-21 PROCEDURE — 99223 1ST HOSP IP/OBS HIGH 75: CPT

## 2024-07-21 PROCEDURE — 80202 ASSAY OF VANCOMYCIN: CPT

## 2024-07-21 PROCEDURE — 2700000000 HC OXYGEN THERAPY PER DAY

## 2024-07-21 PROCEDURE — 87040 BLOOD CULTURE FOR BACTERIA: CPT

## 2024-07-21 PROCEDURE — 1200000000 HC SEMI PRIVATE

## 2024-07-21 PROCEDURE — 85652 RBC SED RATE AUTOMATED: CPT

## 2024-07-21 PROCEDURE — 80048 BASIC METABOLIC PNL TOTAL CA: CPT

## 2024-07-21 PROCEDURE — 36415 COLL VENOUS BLD VENIPUNCTURE: CPT

## 2024-07-21 PROCEDURE — 93005 ELECTROCARDIOGRAM TRACING: CPT | Performed by: STUDENT IN AN ORGANIZED HEALTH CARE EDUCATION/TRAINING PROGRAM

## 2024-07-21 PROCEDURE — 6360000002 HC RX W HCPCS

## 2024-07-21 PROCEDURE — 83036 HEMOGLOBIN GLYCOSYLATED A1C: CPT

## 2024-07-21 PROCEDURE — 83605 ASSAY OF LACTIC ACID: CPT

## 2024-07-21 PROCEDURE — 87086 URINE CULTURE/COLONY COUNT: CPT

## 2024-07-21 PROCEDURE — 82947 ASSAY GLUCOSE BLOOD QUANT: CPT

## 2024-07-21 PROCEDURE — 86140 C-REACTIVE PROTEIN: CPT

## 2024-07-21 RX ORDER — POLYETHYLENE GLYCOL 3350 17 G/17G
17 POWDER, FOR SOLUTION ORAL DAILY PRN
Status: DISCONTINUED | OUTPATIENT
Start: 2024-07-21 | End: 2024-07-25 | Stop reason: HOSPADM

## 2024-07-21 RX ORDER — GLUCAGON 1 MG/ML
1 KIT INJECTION PRN
Status: DISCONTINUED | OUTPATIENT
Start: 2024-07-21 | End: 2024-07-25 | Stop reason: HOSPADM

## 2024-07-21 RX ORDER — ACETAMINOPHEN 325 MG/1
650 TABLET ORAL EVERY 6 HOURS PRN
Status: DISCONTINUED | OUTPATIENT
Start: 2024-07-21 | End: 2024-07-25 | Stop reason: HOSPADM

## 2024-07-21 RX ORDER — DEXTROSE MONOHYDRATE 100 MG/ML
INJECTION, SOLUTION INTRAVENOUS CONTINUOUS PRN
Status: DISCONTINUED | OUTPATIENT
Start: 2024-07-21 | End: 2024-07-25 | Stop reason: HOSPADM

## 2024-07-21 RX ORDER — INSULIN LISPRO 100 [IU]/ML
0-4 INJECTION, SOLUTION INTRAVENOUS; SUBCUTANEOUS
Status: DISCONTINUED | OUTPATIENT
Start: 2024-07-21 | End: 2024-07-25 | Stop reason: HOSPADM

## 2024-07-21 RX ORDER — INSULIN LISPRO 100 [IU]/ML
0-4 INJECTION, SOLUTION INTRAVENOUS; SUBCUTANEOUS NIGHTLY
Status: DISCONTINUED | OUTPATIENT
Start: 2024-07-21 | End: 2024-07-25 | Stop reason: HOSPADM

## 2024-07-21 RX ORDER — ONDANSETRON 2 MG/ML
4 INJECTION INTRAMUSCULAR; INTRAVENOUS EVERY 6 HOURS PRN
Status: DISCONTINUED | OUTPATIENT
Start: 2024-07-21 | End: 2024-07-25 | Stop reason: HOSPADM

## 2024-07-21 RX ORDER — SODIUM CHLORIDE 0.9 % (FLUSH) 0.9 %
5-40 SYRINGE (ML) INJECTION PRN
Status: DISCONTINUED | OUTPATIENT
Start: 2024-07-21 | End: 2024-07-25 | Stop reason: HOSPADM

## 2024-07-21 RX ORDER — ONDANSETRON 4 MG/1
4 TABLET, ORALLY DISINTEGRATING ORAL EVERY 8 HOURS PRN
Status: DISCONTINUED | OUTPATIENT
Start: 2024-07-21 | End: 2024-07-25 | Stop reason: HOSPADM

## 2024-07-21 RX ORDER — SODIUM CHLORIDE 0.9 % (FLUSH) 0.9 %
5-40 SYRINGE (ML) INJECTION EVERY 12 HOURS SCHEDULED
Status: DISCONTINUED | OUTPATIENT
Start: 2024-07-21 | End: 2024-07-25 | Stop reason: HOSPADM

## 2024-07-21 RX ORDER — ATROPINE SULFATE 0.1 MG/ML
INJECTION INTRAVENOUS
Status: DISPENSED
Start: 2024-07-21 | End: 2024-07-21

## 2024-07-21 RX ORDER — IPRATROPIUM BROMIDE AND ALBUTEROL SULFATE 2.5; .5 MG/3ML; MG/3ML
1 SOLUTION RESPIRATORY (INHALATION) EVERY 4 HOURS PRN
Status: DISCONTINUED | OUTPATIENT
Start: 2024-07-21 | End: 2024-07-25 | Stop reason: HOSPADM

## 2024-07-21 RX ORDER — ATORVASTATIN CALCIUM 80 MG/1
80 TABLET, FILM COATED ORAL DAILY
Status: DISCONTINUED | OUTPATIENT
Start: 2024-07-21 | End: 2024-07-25 | Stop reason: HOSPADM

## 2024-07-21 RX ORDER — ACETAMINOPHEN 650 MG/1
650 SUPPOSITORY RECTAL EVERY 6 HOURS PRN
Status: DISCONTINUED | OUTPATIENT
Start: 2024-07-21 | End: 2024-07-25 | Stop reason: HOSPADM

## 2024-07-21 RX ORDER — SODIUM CHLORIDE 9 MG/ML
INJECTION, SOLUTION INTRAVENOUS PRN
Status: DISCONTINUED | OUTPATIENT
Start: 2024-07-21 | End: 2024-07-25 | Stop reason: HOSPADM

## 2024-07-21 RX ORDER — HEPARIN SODIUM 5000 [USP'U]/ML
5000 INJECTION, SOLUTION INTRAVENOUS; SUBCUTANEOUS EVERY 8 HOURS SCHEDULED
Status: DISCONTINUED | OUTPATIENT
Start: 2024-07-21 | End: 2024-07-25 | Stop reason: HOSPADM

## 2024-07-21 RX ORDER — POTASSIUM CHLORIDE 7.45 MG/ML
10 INJECTION INTRAVENOUS PRN
Status: DISCONTINUED | OUTPATIENT
Start: 2024-07-21 | End: 2024-07-25 | Stop reason: HOSPADM

## 2024-07-21 RX ORDER — POTASSIUM CHLORIDE 29.8 MG/ML
20 INJECTION INTRAVENOUS PRN
Status: DISCONTINUED | OUTPATIENT
Start: 2024-07-21 | End: 2024-07-25 | Stop reason: HOSPADM

## 2024-07-21 RX ORDER — ENOXAPARIN SODIUM 100 MG/ML
40 INJECTION SUBCUTANEOUS 2 TIMES DAILY
Status: DISCONTINUED | OUTPATIENT
Start: 2024-07-21 | End: 2024-07-21

## 2024-07-21 RX ORDER — MAGNESIUM SULFATE IN WATER 40 MG/ML
2000 INJECTION, SOLUTION INTRAVENOUS PRN
Status: DISCONTINUED | OUTPATIENT
Start: 2024-07-21 | End: 2024-07-25 | Stop reason: HOSPADM

## 2024-07-21 RX ADMIN — SODIUM CHLORIDE: 9 INJECTION, SOLUTION INTRAVENOUS at 01:28

## 2024-07-21 RX ADMIN — CEFEPIME 2000 MG: 2 INJECTION, POWDER, FOR SOLUTION INTRAVENOUS at 08:16

## 2024-07-21 RX ADMIN — SODIUM CHLORIDE: 9 INJECTION, SOLUTION INTRAVENOUS at 09:53

## 2024-07-21 RX ADMIN — CEFEPIME 2000 MG: 2 INJECTION, POWDER, FOR SOLUTION INTRAVENOUS at 16:19

## 2024-07-21 RX ADMIN — SODIUM CHLORIDE 1250 MG: 9 INJECTION, SOLUTION INTRAVENOUS at 02:52

## 2024-07-21 RX ADMIN — SODIUM CHLORIDE, PRESERVATIVE FREE 10 ML: 5 INJECTION INTRAVENOUS at 20:49

## 2024-07-21 RX ADMIN — SODIUM CHLORIDE, PRESERVATIVE FREE 10 ML: 5 INJECTION INTRAVENOUS at 08:11

## 2024-07-21 RX ADMIN — SODIUM CHLORIDE: 9 INJECTION, SOLUTION INTRAVENOUS at 16:18

## 2024-07-21 RX ADMIN — HEPARIN SODIUM 5000 UNITS: 5000 INJECTION INTRAVENOUS; SUBCUTANEOUS at 13:36

## 2024-07-21 RX ADMIN — ATORVASTATIN CALCIUM 80 MG: 80 TABLET, FILM COATED ORAL at 08:11

## 2024-07-21 RX ADMIN — SODIUM CHLORIDE: 9 INJECTION, SOLUTION INTRAVENOUS at 01:50

## 2024-07-21 RX ADMIN — CEFEPIME 2000 MG: 2 INJECTION, POWDER, FOR SOLUTION INTRAVENOUS at 02:07

## 2024-07-21 RX ADMIN — HEPARIN SODIUM 5000 UNITS: 5000 INJECTION INTRAVENOUS; SUBCUTANEOUS at 05:20

## 2024-07-21 RX ADMIN — Medication 10 MCG/MIN: at 00:33

## 2024-07-21 RX ADMIN — HEPARIN SODIUM 5000 UNITS: 5000 INJECTION INTRAVENOUS; SUBCUTANEOUS at 20:47

## 2024-07-21 ASSESSMENT — PAIN DESCRIPTION - LOCATION: LOCATION: LEG

## 2024-07-21 ASSESSMENT — PAIN DESCRIPTION - ORIENTATION: ORIENTATION: LEFT;LOWER

## 2024-07-21 ASSESSMENT — PAIN SCALES - GENERAL
PAINLEVEL_OUTOF10: 5
PAINLEVEL_OUTOF10: 0

## 2024-07-21 NOTE — ED NOTES
ED to inpatient nurses report      Chief Complaint:  Chief Complaint   Patient presents with    Blood Infection     Present to ED from: [place]     MOA:     LOC: alert and orientated to name, place, date  Mobility: Requires assistance * 1  Oxygen Baseline: 94    Current needs required: room air   Pending ED orders: none  Present condition: stable    Why did the patient come to the ED? Blood infection/Septic shock   What is the plan? Admission  Any procedures or intervention occur? Labs  Any safety concerns?? NA    Mental Status:  Level of Consciousness: Alert (0)    Psych Assessment:   Psychosocial  Psychosocial (WDL): Within Defined Limits  Vital signs   Vitals:    07/20/24 2323 07/20/24 2327   BP: 130/69    Pulse: 71    Resp: 21    Temp: 99.3 °F (37.4 °C)    TempSrc: Oral    SpO2:  94%   Weight: (!) 175 kg (385 lb 12.9 oz)    Height: 1.905 m (6' 3\")         Vitals:  Patient Vitals for the past 24 hrs:   BP Temp Temp src Pulse Resp SpO2 Height Weight   07/20/24 2327 -- -- -- -- -- 94 % -- --   07/20/24 2323 130/69 99.3 °F (37.4 °C) Oral 71 21 -- 1.905 m (6' 3\") (!) 175 kg (385 lb 12.9 oz)      Visit Vitals  /69   Pulse 71   Temp 99.3 °F (37.4 °C) (Oral)   Resp 21   Ht 1.905 m (6' 3\")   Wt (!) 175 kg (385 lb 12.9 oz)   SpO2 94%   BMI 48.22 kg/m²        LDAs:   Peripheral IV 07/20/24 Right Antecubital (Active)       Peripheral IV 07/20/24 Left;Anterior Forearm (Active)       Ambulatory Status:  Presents to emergency department  because of falls (Syncope, seizure, or loss of consciousness): No, Age > 70: No, Altered Mental Status, Intoxication with alcohol or substance confusion (Disorientation, impaired judgment, poor safety awaremess, or inability to follow instructions): No, Impaired Mobility: Ambulates or transfers with assistive devices or assistance; Unable to ambulate or transer.: No, Nursing Judgement: No    Diagnosis:  DISPOSITION Decision To Admit 07/20/2024 11:25:33 PM   Final diagnoses:   Cellulitis        Electronically signed by Tunde Swan RN on 7/20/2024 at 11:38 PM

## 2024-07-21 NOTE — H&P
Critical Care - History and Physical Examination    Patient's name:  Viral Kang  Medical Record Number: [id number]  Patient's account/billing number: [id number]  Patient's YOB: 1970  Age: 53 y.o.  Date of Admission: 7/20/2024 11:14 PM  Date of History and Physical Examination: 7/21/2024      Primary Care Physician: Woody Fraga DNP  Attending Physician: Dr. Vern Lynch    Code Status: Prior    Chief complaint:   Chief Complaint   Patient presents with    Blood Infection         HISTORY OF PRESENT ILLNESS:      History was obtained from chart review and the patient.      Viral Kang is a 53 y.o. with PMH of type 2 diabetes mellitus on metformin and hypertension on lisinopril.    Patient presented to [facility] ER on 7/20/2024 with a chief complaints of redness and swelling of his left leg.  The patient has had history of cellulitis of his left lower extremity as well as right lower extremity in the past and has required IV antibiotics and hospitalization before.  According to patient, he started feeling fatigued and weak in the morning and then realized that he had to go to ER for his management.    When he arrived in ER, he was initially doing okay but started losing his blood pressure and labs.  His labs went down less than 60 with decreased systolic blood pressure.  The patient received total of 4 L of fluid bolus which did not improve his blood pressure and ultimately was started on pressor support with Levophed.  Labs showed sodium 132, potassium 4.5, creatinine 1.1, lactic acid 1.7, proBNP 45, troponin 13, glucose 162, WBC count 17.4.  Patient was then shifted to [facility] for further management through [facility].  When the patient arrived here, patient initially was taken off of pressor support but his maps started going down again.  Repeat lactic acid level was 3.1 which increased from 1.6.  WBC count increased from 17.4-21.9.  Patient received vancomycin and  °C)      Patient Vitals for the past 8 hrs:   BP Temp Temp src Pulse Resp SpO2 Height Weight   07/21/24 0009 (!) 92/26 -- -- 84 19 98 % -- --   07/20/24 2354 (!) 122/96 -- -- 92 27 90 % -- --   07/20/24 2344 138/81 -- -- 90 22 95 % -- --   07/20/24 2327 -- -- -- -- -- 94 % -- --   07/20/24 2323 130/69 99.3 °F (37.4 °C) Oral 71 21 -- 1.905 m (6' 3\") (!) 175 kg (385 lb 12.9 oz)       No intake or output data in the 24 hours ending 07/21/24 0017    Wt Readings from Last 3 Encounters:   07/20/24 (!) 175 kg (385 lb 12.9 oz)   07/20/24 (!) 175.1 kg (386 lb)   05/07/24 (!) 175.5 kg (387 lb)     Body mass index is 48.22 kg/m².        PHYSICAL EXAM:  Physical Exam  Constitutional:       Appearance: Normal appearance. He is obese. He is not ill-appearing or toxic-appearing.   HENT:      Head: Normocephalic and atraumatic.      Nose: Nose normal. No congestion.      Mouth/Throat:      Mouth: Mucous membranes are moist.      Pharynx: Oropharynx is clear. No oropharyngeal exudate.   Eyes:      General: No scleral icterus.     Extraocular Movements: Extraocular movements intact.      Pupils: Pupils are equal, round, and reactive to light.   Cardiovascular:      Rate and Rhythm: Normal rate and regular rhythm.      Pulses: Normal pulses.      Heart sounds: No murmur heard.  Pulmonary:      Effort: Pulmonary effort is normal. No respiratory distress.      Breath sounds: No stridor. No wheezing or rhonchi.   Abdominal:      General: There is no distension.      Palpations: Abdomen is soft. There is no mass.      Tenderness: There is no abdominal tenderness.   Musculoskeletal:         General: Swelling and tenderness present. No deformity. Normal range of motion.      Cervical back: Normal range of motion. No rigidity.      Right lower leg: Edema present.      Left lower leg: Edema present.   Skin:     General: Skin is warm.      Coloration: Skin is not jaundiced.      Findings: Erythema and lesion present. No bruising.

## 2024-07-21 NOTE — PROGRESS NOTES
Assessment: Patient was awake and oriented when  visited. Family was present and open to spiritual care. Patient remained calm and seemed to appreciate the treatment and care he was receiving. When asked how he was feeling, patient responded, \"better.\" Patient said he was raised [employer] but not affiliated with a [place]. Patient received sacrament of anointing of the sick.   Intervention:  provided ministry of presence, offered support and prayed with patient and family.  Outcome: Patient and family expressed gratitude for the anointing and blessing they received.   Plan: Chaplains would continue to remain available for more spiritual and emotional support.

## 2024-07-21 NOTE — PLAN OF CARE
SUBJECTIVE:     OVERNIGHT EVENTS:        TODAY:  Patient has been of Vasopressors since 6 am, A&O x 3, States still has mild fatigue but denies any SOB, nor chest pain        OBJECTIVE:     VITAL SIGNS:  /64   Pulse 75   Temp 97.8 °F (36.6 °C) (Oral)   Resp 17   Ht 1.905 m (6' 3\")   Wt (!) 177 kg (390 lb 3.2 oz)   SpO2 98%   BMI 48.77 kg/m²   Tmax over 24 hours:  Temp (24hrs), Av °F (37.2 °C), Min:97.8 °F (36.6 °C), Max:100.6 °F (38.1 °C)            ASSESSMENT:     Patient Active Problem List    Diagnosis Date Noted    Septic shock (HCC) 2024    Cellulitis of left lower leg 2024    Mixed hyperlipidemia 2023    Family history of colon cancer in father 2022    Type 2 diabetes mellitus without complication, without long-term current use of insulin ([facility]) 10/26/2021    Cervical disc herniation 2019    Radiculopathy 2019    Essential hypertension 2018    Recurrent cellulitis of lower leg 2014          PLAN:     ICU PROPHYLAXIS:  Stress ulcer:  [] PPI Agent  [] [id number] [] Sucralfate  [] Other:  VTE:   [] Enoxaparin  [x] Unfract. Heparin Subcut  [] EPC Cuffs    NUTRITION:  [] NPO [] Tube Feeding (Specify: ) [] TPN  [x] PO    HOME MEDS RECONCILED: [] No  [x] Yes    CONSULTATION NEEDED:  [x] No  [] Yes    FAMILY UPDATED:    [] No  [x] Yes    TRANSFER OUT OF ICU:   [] No  [x] Yes      Plan:  Continue IV Abx, Consider Daily CRP if trending up if not improving consider ID consult   Site is warm to touch, circumferential erythema of Left LE, mildly tender to touch  No recent procedures , Low Rist for DVT, likely will not require Doppler  Patient is off Vasopressors, will monitor but if continues to be off Levophed consider transferring out of ICU if no vasopressor requirement   Tolerating PO diet   Blood Cultures negative less than 24 hours             Shantel Carson MD  Family  Medicine PGY2  2024 7:35 AM

## 2024-07-21 NOTE — CARE COORDINATION
Case Management Assessment  Initial Evaluation    Date/Time of Evaluation: 7/21/2024 1:30 PM  Assessment Completed by: Ivette Sousa RN    If patient is discharged prior to next notation, then this note serves as note for discharge by case management.    Patient Name: Viral Kang                   YOB: 1970  Diagnosis: Cellulitis of left lower extremity [L03.116]  Septic shock (HCC) [A41.9, R65.21]                   Date / Time: 7/20/2024 11:14 PM    Patient Admission Status: Inpatient   Readmission Risk (Low < 19, Mod (19-27), High > 27): Readmission Risk Score: 12.9    Current PCP: Woody Fraga DNP  PCP verified by CM? (P) Yes    Chart Reviewed: Yes      History Provided by: (P) Patient  Patient Orientation: (P) Alert and Oriented    Patient Cognition: (P) Alert    Hospitalization in the last 30 days (Readmission):  No    If yes, Readmission Assessment in CM Navigator will be completed.    Advance Directives:      Code Status: Full Code   Patient's Primary Decision Maker is: (P) Legal Next of Kin    Primary Decision Maker: Idalmis Kang - Spouse - [phone]    Discharge Planning:    Patient lives with: (P) Spouse/Significant Other, Children Type of Home: (P) House  Primary Care Giver: (P) Self  Patient Support Systems include: (P) Spouse/Significant Other, Children, Family Members   Current Financial resources:    Current community resources:    Current services prior to admission: (P) C-pap            Current DME:              Type of Home Care services:  (P) None    ADLS  Prior functional level: (P) Independent in ADLs/IADLs  Current functional level: (P) Independent in ADLs/IADLs    PT AM-PAC:   /24  OT AM-PAC:   /24    Family can provide assistance at DC: (P) Yes  Would you like Case Management to discuss the discharge plan with any other family members/significant others, and if so, who? (P) No  Plans to Return to Present Housing: (P) Yes  Other Identified Issues/Barriers to  RETURNING to current housing: none  Potential Assistance needed at discharge: (P) N/A            Potential DME:    Patient expects to discharge to: (P) House  Plan for transportation at discharge: (P) Family    Financial    Payor: [employer] / Plan: [employer] [address] / Product Type: *No Product type* /     Does insurance require precert for SNF: Yes    Potential assistance Purchasing Medications:    Meds-to-Beds request: Yes      [employer] [address] [place] [address] - [address] [address] [phone] - F [phone]  [address] [place] [address]  Phone: [phone] Fax: [phone]      Notes:    Factors facilitating achievement of predicted outcomes: Family support, Cooperative, and Pleasant    Barriers to discharge: Medical complications    Additional Case Management Notes: patient returning home with his wife and daughter. He is independent. He denies needs for home    The Plan for Transition of Care is related to the following treatment goals of Cellulitis of left lower extremity [L03.116]  Septic shock (HCC) [A41.9, R65.21]    The Patient and/or Patient Representative Agree with the Discharge Plan?  yes    Ivette Sousa RN  Case Management Department  Ph: [phone] Fax: [phone]

## 2024-07-21 NOTE — RT PROTOCOL NOTE
RT Nebulizer Bronchodilator Protocol Note    There is a bronchodilator order in the chart from a provider indicating to follow the RT Bronchodilator Protocol and there is an “Initiate RT Bronchodilator Protocol” order as well (see protocol at bottom of note).    CXR Findings:  XR CHEST PORTABLE    Result Date: 7/20/2024  Impression: 1. Expiratory chest. 2. No acute heart or lung disease identified.       The findings from the last RT Protocol Assessment were as follows:  Smoking: Smoker 15 pack years or more  Respiratory Pattern: Regular pattern and RR 12-20 bpm  Breath Sounds: Clear breath sounds  Cough: Strong, spontaneous, non-productive  Indication for Bronchodilator Therapy:    Bronchodilator Assessment Score: 1    Aerosolized bronchodilator medication orders have been revised according to the RT Nebulizer Bronchodilator Protocol below.    Respiratory Therapist to perform RT Therapy Protocol Assessment initially then follow the protocol.  Repeat RT Therapy Protocol Assessment PRN for score 0-3 or on second treatment, BID, and PRN for scores above 3.    No Indications - adjust the frequency to every 6 hours PRN wheezing or bronchospasm, if no treatments needed after 48 hours then discontinue using Per Protocol order mode.     If indication present, adjust the RT bronchodilator orders based on the Bronchodilator Assessment Score as indicated below.  If a patient is on this medication at home then do not decrease Frequency below that used at home.    0-3 - enter or revise RT bronchodilator order(s) to equivalent RT Bronchodilator order with Frequency of every 4 hours PRN for wheezing or increased work of breathing using Per Protocol order mode.       4-6 - enter or revise RT Bronchodilator order(s) to two equivalent RT bronchodilator orders with one order with BID Frequency and one order with Frequency of every 4 hours PRN wheezing or increased work of breathing using Per Protocol order mode.         7-10 - enter

## 2024-07-21 NOTE — ED TRIAGE NOTES
Pt to Ed via life flight from [place] with concerns for sepsis. Pt was seen in January for Cellulitis in his left leg. Recently, pt states that left leg started to feel the same way it did in January and went to the ED. PTA: negative for dimer, negative trop, 17.4 WBC, LAC 2.4 now 1.6, blood cultures were position, 1 gram of Rocephin given, 1 gram of Vanco given, 4L NS, 1g Tylenol. Levo running at 5mcg/min. Pt is A&Ox4, pt's respirations are even, non-labored, pt denies chest pain, pt denies shortness of breath. Dr. Cason and Dr. Wylie at bedside to assess.

## 2024-07-21 NOTE — PROGRESS NOTES
[facility]   Pharmacy Pharmacokinetic Monitoring Service - Vancomycin     Viral Kang is a 53 y.o. male starting on vancomycin therapy for possible sepsis from [facility]. Pharmacy consulted by Tj Shoemaker  for monitoring and adjustment.    Target Concentration: Goal AUC/CANDY 400-600 mg*hr/L    Additional Antimicrobials: cefepime    Pertinent Laboratory Values:   Wt Readings from Last 1 Encounters:   07/20/24 (!) 175 kg (385 lb 12.9 oz)     Temp Readings from Last 1 Encounters:   07/20/24 99.3 °F (37.4 °C) (Oral)     Estimated Creatinine Clearance: 87 mL/min (A) (based on SCr of 1.7 mg/dL (H)).  Recent Labs     07/20/24  1239 07/20/24  2332   CREATININE 1.1 1.7*   BUN 25* 30*   WBC 17.4* 21.9*     Procalcitonin:     Pertinent Cultures:  Culture Date Source Results   pending     MRSA Nasal Swab: N/A. Non-respiratory infection.    Plan:  Dosing recommendations based on Bayesian software  Start vancomycin 1250 mg every 18 hours.  Will monitor closely due to large increase in serum creatine within past 24 hours.  Anticipated AUC of 507 and trough concentration of 13.7 at steady state  Renal labs as indicated   Vancomycin concentration ordered for 7/21/24 @ 1500   Pharmacy will continue to monitor patient and adjust therapy as indicated    Thank you for the consult,  Michael Christian RPH  7/21/2024 1:07 AM

## 2024-07-21 NOTE — PLAN OF CARE
Problem: Pain  Goal: Verbalizes/displays adequate comfort level or baseline comfort level  Outcome: Progressing  Flowsheets  Taken 7/21/2024 1600  Verbalizes/displays adequate comfort level or baseline comfort level: Assess pain using appropriate pain scale  Taken 7/21/2024 1200  Verbalizes/displays adequate comfort level or baseline comfort level: Assess pain using appropriate pain scale  Taken 7/21/2024 0800  Verbalizes/displays adequate comfort level or baseline comfort level: Assess pain using appropriate pain scale     Problem: Skin/Tissue Integrity  Goal: Absence of new skin breakdown  Description: 1.  Monitor for areas of redness and/or skin breakdown  2.  Assess vascular access sites hourly  3.  Every 4-6 hours minimum:  Change oxygen saturation probe site  4.  Every 4-6 hours:  If on nasal continuous positive airway pressure, respiratory therapy assess nares and determine need for appliance change or resting period.  Outcome: Progressing     Problem: ABCDS Injury Assessment  Goal: Absence of physical injury  Outcome: Progressing  Flowsheets (Taken 7/21/2024 0800)  Absence of Physical Injury: Implement safety measures based on patient assessment     Problem: Discharge Planning  Goal: Discharge to home or other facility with appropriate resources  Outcome: Progressing  Flowsheets (Taken 7/21/2024 0800)  Discharge to home or other facility with appropriate resources: Identify barriers to discharge with patient and caregiver     Problem: Chronic Conditions and Co-morbidities  Goal: Patient's chronic conditions and co-morbidity symptoms are monitored and maintained or improved  Outcome: Progressing

## 2024-07-21 NOTE — ED PROVIDER NOTES
[facility]  Emergency Department Encounter  Emergency Medicine Resident     Pt Name:Viral Kang  MRN: [id number]  Birthdate 1970  Date of evaluation: 7/20/24  PCP:  Woody Fraga DNP  Note Started: 11:22 PM EDT      CHIEF COMPLAINT       Chief Complaint   Patient presents with    Blood Infection       HISTORY OF PRESENT ILLNESS  (Location/Symptom, Timing/Onset, Context/Setting, Quality, Duration, Modifying Factors, Severity.)      Viral Kang is a 53 y.o. male with a history of recurrent cellulitis of his left leg, hypertension, type 2 diabetes who presents as a transfer from [facility] due to concerns for septic shock from cellulitis of his left leg.  Patient states he was at his daughter's house today when he began to feel unwell.  He was feeling tired, shortness of breath    Patient received 4 L IV fluid at outlying facility.  Is currently on Levophed.  [facility]     Patient's initial lactic acid [facility] 2.4, white blood cell count 17.4, blood and urine cultures drawn, chest x-ray shows no acute cardiopulmonary abnormality    PAST MEDICAL / SURGICAL / SOCIAL / FAMILY HISTORY      has a past medical history of Arthritis, Hypertension, Mixed hyperlipidemia, Type 2 diabetes mellitus without complication, without long-term current use of insulin (HCC), and Unspecified sleep apnea.       has a past surgical history that includes hip surgery (Left); Tonsillectomy; joint replacement (Left, 1997); fracture surgery (Left, 1997); cervical fusion (N/A, 12/5/2019); and Colonoscopy (N/A, 1/24/2022).      Social History     Socioeconomic History    Marital status:      Spouse name: Not on file    Number of children: Not on file    Years of education: Not on file    Highest education level: Not on file   Occupational History    Not on file   Tobacco Use    Smoking status: Former     Current packs/day: 0.00     Average packs/day: 0.3

## 2024-07-21 NOTE — ED PROVIDER NOTES
[facility]     Emergency Department     Faculty Attestation    I performed a history and physical examination of the patient and discussed management with the resident. I have reviewed and agree with the resident’s findings including all diagnostic interpretations, and treatment plans as written. Any areas of disagreement are noted on the chart. I was personally present for the key portions of any procedures. I have documented in the chart those procedures where I was not present during the key portions. I have reviewed the emergency nurses triage note. I agree with the chief complaint, past medical history, past surgical history, allergies, medications, social and family history as documented unless otherwise noted below. Documentation of the HPI, Physical Exam and Medical Decision Making performed by mary graceibchalo is based on my personal performance of the HPI, PE and MDM. For Physician Assistant/ Nurse Practitioner cases/documentation I have personally evaluated this patient and have completed at least one if not all key elements of the E/M (history, physical exam, and MDM). Additional findings are as noted.    Note Started: 11:34 PM EDT     54 yo M  transferred for sepsis, patient complains of chills, resolved nausea, no chest pain, no shortness of breath,  Previously treated with antibiotics fluids and Levophed,  PE vss gcs 15 radial pulse = d pedal pulse =   Abdomen protuberant, nontender, no rebound, no guarding,    Admit // levo drip    EKG Interpretation    Interpreted by me  Normal sinus, heart rate 90, no ischemia, QTc 467, rbbb  /.  ECG from 12/31/2023, stable,    CRITICAL CARE: There was a high probability of clinically significant/life threatening deterioration in this patient's condition which required my urgent intervention.  Total critical care time was 10 minutes.  This excludes any time for separately reportable procedures.       Velasquez Wylie

## 2024-07-21 NOTE — ED PROVIDER NOTES
Year: 1     Unstable Housing in the Last Year: No       PHYSICAL EXAM    VITAL SIGNS: /61   Pulse 86   Temp 99.8 °F (37.7 °C) (Oral)   Resp 25   Ht 1.905 m (6' 3\")   Wt (!) 175.1 kg (386 lb)   SpO2 91%   BMI 48.25 kg/m²   Constitutional:  Well developed, well nourished, severe acute distress, non-toxic appearance   HENT:  Atraumatic, external ears normal, nose normal, oropharynx dry.  Neck- normal range of motion, no tenderness, supple   Respiratory:  No respiratory distress, normal breath sounds.   Cardiovascular:  Normal rate, normal rhythm, no murmurs, no gallops, no rubs   GI:  Soft, nondistended, normal bowel sounds, nontender   Musculoskeletal:  Left lower leg is erythematous, tender, swollen and warm to touch.   Integument:  Erythema, tenderness, swelling and warmth of left lower leg.Poor skin turgor.  Neurologic:  Grossly intact.     RADIOLOGY/PROCEDURES    XR CHEST PORTABLE   Final Result   Impression:   1. Expiratory chest.   2. No acute heart or lung disease identified.               Labs  Labs Reviewed   CBC WITH AUTO DIFFERENTIAL - Abnormal; Notable for the following components:       Result Value    WBC 17.4 (*)     Neutrophils % 88 (*)     Lymphocytes % 7 (*)     Neutrophils Absolute 15.31 (*)     All other components within normal limits   COMPREHENSIVE METABOLIC PANEL - Abnormal; Notable for the following components:    Sodium 132 (*)     Chloride 94 (*)     Glucose 162 (*)     BUN 25 (*)     Total Bilirubin 2.5 (*)     ALT 50 (*)     All other components within normal limits   LACTIC ACID - Abnormal; Notable for the following components:    Lactic Acid 2.4 (*)     All other components within normal limits   URINALYSIS WITH REFLEX TO CULTURE - Abnormal; Notable for the following components:    Color, UA HEDY (*)     Glucose, Ur 50 mg/dL (*)     Urine Hgb TRACE (*)     Protein, UA 2+ (*)     All other components within normal limits   MICROSCOPIC URINALYSIS - Abnormal; Notable for the

## 2024-07-21 NOTE — CONSULTS
[facility] - Initial Consult Note  Today's Date and Time: 7/21/2024, 1:11 PM    Impression :   Cellulitis of left lower extremity  Concern for septicemia  KETTY  Type 2 diabetes mellitus  Venous stasis dermatitis    Recommendations:   D/C Vancomycin   MRSA screen negative  KETTY  Cefepime pending cultures  ASO titer    Medical Decision Making/Summary/Discussion:7/21/2024     Daily Elements of Decision Making provided by Consulting Physician:    Note: I have independently performed the steps listed below as part of the medical decision making and evaluation.    Review of current Problems:  Today I am seeing the patient for the following problems:  Cellulitis of left lower extremity  Concern for septicemia  KETTY  Type 2 diabetes mellitus  Venous stasis dermatitis  Evaluation of Patient:  Patient evaluated and examined in the ICU.  Evaluated because of cellulitis of the lower extremity and hypotension  Please see daily details in Interval Changes Section  Changes in physical exam:  Cellulitis of the lower extremity  Please see daily details in Physical Exam section and in Interval Changes Section  Changes in ROS:  Improved  Please see daily details in Review of Symptoms section and in Interval Changes Section  Discussion with Referring Physician or Service  Dr. Lynch  Laboratory and Radiologic Studies with personal review of radiologic studies  Laboratory  Radiology  Microbiology  Please see Details in daily Interval Changes Section devoted to Lab, Micro and Radiology and in Medical Decision Laboratory, Imaging and Cultures sections.  Review of Infection Control and Prevention measures:  Universal precautions  Please see daily details in Infection Control Recommendations section  Administer medications as ordered:  Cefepime pending further data  Review of Antimicrobial Stewardship Measures:  Targeted therapy  PK dosing  Please see daily details in Antimicrobial Stewardship  Results   should not be used to guide or monitor treatment for MRSA infections.         Culture, Blood 2 [[id number]] Collected: 07/20/24 1427    Order Status: Completed Specimen: Blood Updated: 07/21/24 0623     Specimen Description .BLOOD     Special Requests Site: Blood     Culture NO GROWTH 15 HOURS    Culture, Blood 1 [[id number]] Collected: 07/20/24 1426    Order Status: Completed Specimen: Blood Updated: 07/21/24 0623     Specimen Description .BLOOD     Special Requests          Culture NO GROWTH 15 HOURS              Medical Decision Making-Other:     Note:    Thank you for allowing us to participate in the care of this patient. Please call with questions.    MD Elisa Bhatti DPM participated in the evaluation of the patient under supervision.       ATTESTATION:     I have discussed the case, including pertinent history and exam findings with the medical resident. I have evaluated the  History, physical findings and pictures of the patient and the key elements of the encounter have been performed by me. I have reviewed the laboratory data, other diagnostic studies and discussed them with the medical resident. I have updated the medical record where necessary.     I agree with the assessment, plan and orders as documented by the medical resident and I have modified them as necessary.     Omar Buchanan MD    7/21/2024     Office: [phone]

## 2024-07-21 NOTE — TRANSITION OF CARE
Critical care team - Resident sign-out to medicine service      Date and time: 7/21/2024 2:47 PM  Patient's name:  Viral Kang  Medical Record Number: [id number]  Patient's account/billing number: [id number]  Patient's YOB: 1970  Age: 53 y.o.  Date of Admission: 7/20/2024 11:14 PM  Length of stay during current admission: 0    Primary Care Physician: Woody Fraga DNP    Code Status: Full Code    Mode of physician to physician communication:        [x] Via telephone   [] In person     Date and time of sign-out: 7/21/2024 2:47 PM    Accepting Internal Medicine Physician: Umberto Belcher    Accepting Medicine team: Dewayne    Accepting team's attending: Dr. Bernard    Patient's current ICU Bed:  3015     Patient's assigned bed on floor:  537        [x] Med-Surg Monitored [] Step-down       [] Psychiatry ICU       [] Psych floor     Reason for ICU admission:     Septic Shock requiring Vasopressors    ICU course summary:     Patient is a 52 yo male with Pmhx of T2DM, HTN, COLLETTE whom presented to [facility] for left leg swelling and erythema. Stated had similar symptoms on 1/24 and was diagnosed with Cellulitis and started on medications. Patient was started on Minimal Dose levophed but since 6 AM patient has been off Vasopressors. Patient denies any fever, SOB, and is A&O x4. Patient is tolerating PO diet without issues     Procedures during patient's ICU stay:         Current Vitals:     BP (!) 102/55   Pulse 89   Temp 97.5 °F (36.4 °C) (Oral)   Resp 22   Ht 1.905 m (6' 3\")   Wt (!) 177 kg (390 lb 3.2 oz)   SpO2 96%   BMI 48.77 kg/m²       Cultures:     Blood cultures:                 [] None drawn      [] Negative             []  Positive (Details:  )  Urine Culture:                   [] None drawn      [] Negative             []  Positive (Details:  )  Sputum Culture:               [] None drawn       [] Negative             []  Positive (Details:  )   Endotracheal

## 2024-07-21 NOTE — PROGRESS NOTES
[facility]  PROGRESS NOTE    Shift date: 7/21/2024  Shift day: Sunday   Shift # 1    Room # 3015/3015-01   Name: Viral Kang                Orthodox: Congregation   Place of Orthodoxy: N/A    Referral:  Social Isolation List    Admit Date & Time: 7/20/2024 11:14 PM    Assessment:  Viral Kang is a 53 y.o. male in the hospital because of Septic shock. Upon entering the room writer observes patient awake, alert and watching TV with no family present but spouse is coming.      Intervention:  Writer introduced self and title as  Kalyani for spiritual care. Writer offered space for patient  to express feelings, needs, and concerns and provided a ministry presence.     Outcome:  Patient requested prayer and prayer was given. Patient is Congregation and was ok with a standard prayer stating he was not 100% Congregation because the writer explained that he was not Congregation and Congregation prayers and rituals he cannot provide. Patient expressed gratitude and thanksgiving for the visit and prayer.     Plan:  Chaplains will remain available to offer spiritual and emotional support as needed.      Electronically signed by REESE GATES, Intern, on 7/21/2024 at 9:39 AM.  [facility]  [phone]       07/21/24 0936   Encounter Summary   Encounter Overview/Reason Loneliness/Social Isolation   Service Provided For Patient   Referral/Consult From Rounding   Support System Spouse   Last Encounter  07/21/24   Complexity of Encounter Low   Begin Time 0905   End Time  0915   Total Time Calculated 10 min   Spiritual/Emotional needs   Type Spiritual Support   Assessment/Intervention/Outcome   Assessment Calm;Coping;Hopeful   Intervention Active listening;Prayer (assurance of)/[place];Sustaining Presence/Ministry of presence   Outcome Acceptance;Coping;Expressed Gratitude

## 2024-07-22 ENCOUNTER — APPOINTMENT (OUTPATIENT)
Dept: VASCULAR LAB | Age: 54
End: 2024-07-22
Payer: COMMERCIAL

## 2024-07-22 PROBLEM — E11.65 TYPE 2 DIABETES MELLITUS WITH HYPERGLYCEMIA, WITHOUT LONG-TERM CURRENT USE OF INSULIN (HCC): Status: ACTIVE | Noted: 2021-10-26

## 2024-07-22 LAB
ANION GAP SERPL CALCULATED.3IONS-SCNC: 9 MMOL/L (ref 9–16)
BASOPHILS # BLD: 0.03 K/UL (ref 0–0.2)
BASOPHILS NFR BLD: 0 % (ref 0–2)
BUN SERPL-MCNC: 17 MG/DL (ref 6–20)
CALCIUM SERPL-MCNC: 8.4 MG/DL (ref 8.6–10.4)
CHLORIDE SERPL-SCNC: 100 MMOL/L (ref 98–107)
CO2 SERPL-SCNC: 22 MMOL/L (ref 20–31)
CREAT SERPL-MCNC: 1.1 MG/DL (ref 0.7–1.2)
ECHO BSA: 3.06 M2
EKG ATRIAL RATE: 104 BPM
EKG ATRIAL RATE: 63 BPM
EKG ATRIAL RATE: 90 BPM
EKG P AXIS: 43 DEGREES
EKG P AXIS: 48 DEGREES
EKG P AXIS: 57 DEGREES
EKG P-R INTERVAL: 126 MS
EKG P-R INTERVAL: 128 MS
EKG P-R INTERVAL: 134 MS
EKG Q-T INTERVAL: 342 MS
EKG Q-T INTERVAL: 382 MS
EKG Q-T INTERVAL: 454 MS
EKG QRS DURATION: 126 MS
EKG QRS DURATION: 138 MS
EKG QRS DURATION: 150 MS
EKG QTC CALCULATION (BAZETT): 449 MS
EKG QTC CALCULATION (BAZETT): 464 MS
EKG QTC CALCULATION (BAZETT): 467 MS
EKG R AXIS: -45 DEGREES
EKG R AXIS: -46 DEGREES
EKG R AXIS: -56 DEGREES
EKG T AXIS: 41 DEGREES
EKG T AXIS: 48 DEGREES
EKG T AXIS: 54 DEGREES
EKG VENTRICULAR RATE: 104 BPM
EKG VENTRICULAR RATE: 63 BPM
EKG VENTRICULAR RATE: 90 BPM
EOSINOPHIL # BLD: <0.03 K/UL (ref 0–0.44)
EOSINOPHILS RELATIVE PERCENT: 0 % (ref 1–4)
ERYTHROCYTE [DISTWIDTH] IN BLOOD BY AUTOMATED COUNT: 13.4 % (ref 11.8–14.4)
EST. AVERAGE GLUCOSE BLD GHB EST-MCNC: 148 MG/DL
GFR, ESTIMATED: 81 ML/MIN/1.73M2
GLUCOSE BLD-MCNC: 143 MG/DL (ref 75–110)
GLUCOSE BLD-MCNC: 156 MG/DL (ref 75–110)
GLUCOSE BLD-MCNC: 194 MG/DL (ref 75–110)
GLUCOSE BLD-MCNC: 229 MG/DL (ref 75–110)
GLUCOSE SERPL-MCNC: 137 MG/DL (ref 74–99)
HBA1C MFR BLD: 6.8 % (ref 4–6)
HCT VFR BLD AUTO: 38.5 % (ref 40.7–50.3)
HGB BLD-MCNC: 12.5 G/DL (ref 13–17)
IMM GRANULOCYTES # BLD AUTO: 0.09 K/UL (ref 0–0.3)
IMM GRANULOCYTES NFR BLD: 1 %
INTERVENTION: NORMAL
LYMPHOCYTES NFR BLD: 1.05 K/UL (ref 1.1–3.7)
LYMPHOCYTES RELATIVE PERCENT: 9 % (ref 24–43)
MCH RBC QN AUTO: 29.8 PG (ref 25.2–33.5)
MCHC RBC AUTO-ENTMCNC: 32.5 G/DL (ref 28.4–34.8)
MCV RBC AUTO: 91.7 FL (ref 82.6–102.9)
MICROORGANISM SPEC CULT: NO GROWTH
MONOCYTES NFR BLD: 0.72 K/UL (ref 0.1–1.2)
MONOCYTES NFR BLD: 6 % (ref 3–12)
NEUTROPHILS NFR BLD: 85 % (ref 36–65)
NEUTS SEG NFR BLD: 10.52 K/UL (ref 1.5–8.1)
NRBC BLD-RTO: 0 PER 100 WBC
PLATELET # BLD AUTO: 98 K/UL (ref 138–453)
PMV BLD AUTO: 9.3 FL (ref 8.1–13.5)
POTASSIUM SERPL-SCNC: 3.7 MMOL/L (ref 3.7–5.3)
RBC # BLD AUTO: 4.2 M/UL (ref 4.21–5.77)
SERVICE CMNT-IMP: NORMAL
SODIUM SERPL-SCNC: 131 MMOL/L (ref 136–145)
SPECIMEN DESCRIPTION: NORMAL
WBC OTHER # BLD: 12.4 K/UL (ref 3.5–11.3)

## 2024-07-22 PROCEDURE — 6360000002 HC RX W HCPCS

## 2024-07-22 PROCEDURE — 86063 ANTISTREPTOLYSIN O SCREEN: CPT

## 2024-07-22 PROCEDURE — 1200000000 HC SEMI PRIVATE

## 2024-07-22 PROCEDURE — 93970 EXTREMITY STUDY: CPT | Performed by: SURGERY

## 2024-07-22 PROCEDURE — 97535 SELF CARE MNGMENT TRAINING: CPT

## 2024-07-22 PROCEDURE — 99232 SBSQ HOSP IP/OBS MODERATE 35: CPT | Performed by: INTERNAL MEDICINE

## 2024-07-22 PROCEDURE — 93010 ELECTROCARDIOGRAM REPORT: CPT | Performed by: INTERNAL MEDICINE

## 2024-07-22 PROCEDURE — 97166 OT EVAL MOD COMPLEX 45 MIN: CPT

## 2024-07-22 PROCEDURE — 6370000000 HC RX 637 (ALT 250 FOR IP)

## 2024-07-22 PROCEDURE — 97530 THERAPEUTIC ACTIVITIES: CPT

## 2024-07-22 PROCEDURE — 36415 COLL VENOUS BLD VENIPUNCTURE: CPT

## 2024-07-22 PROCEDURE — 82947 ASSAY GLUCOSE BLOOD QUANT: CPT

## 2024-07-22 PROCEDURE — 2580000003 HC RX 258

## 2024-07-22 PROCEDURE — 93970 EXTREMITY STUDY: CPT

## 2024-07-22 PROCEDURE — 85025 COMPLETE CBC W/AUTO DIFF WBC: CPT

## 2024-07-22 PROCEDURE — 80048 BASIC METABOLIC PNL TOTAL CA: CPT

## 2024-07-22 PROCEDURE — 99222 1ST HOSP IP/OBS MODERATE 55: CPT | Performed by: STUDENT IN AN ORGANIZED HEALTH CARE EDUCATION/TRAINING PROGRAM

## 2024-07-22 RX ADMIN — ACETAMINOPHEN 650 MG: 325 TABLET ORAL at 13:04

## 2024-07-22 RX ADMIN — SODIUM CHLORIDE: 9 INJECTION, SOLUTION INTRAVENOUS at 00:42

## 2024-07-22 RX ADMIN — ATORVASTATIN CALCIUM 80 MG: 80 TABLET, FILM COATED ORAL at 10:50

## 2024-07-22 RX ADMIN — SODIUM CHLORIDE, PRESERVATIVE FREE 10 ML: 5 INJECTION INTRAVENOUS at 11:08

## 2024-07-22 RX ADMIN — HEPARIN SODIUM 5000 UNITS: 5000 INJECTION INTRAVENOUS; SUBCUTANEOUS at 06:39

## 2024-07-22 RX ADMIN — HEPARIN SODIUM 5000 UNITS: 5000 INJECTION INTRAVENOUS; SUBCUTANEOUS at 18:19

## 2024-07-22 RX ADMIN — CEFEPIME 2000 MG: 2 INJECTION, POWDER, FOR SOLUTION INTRAVENOUS at 00:44

## 2024-07-22 RX ADMIN — CEFEPIME 2000 MG: 2 INJECTION, POWDER, FOR SOLUTION INTRAVENOUS at 10:49

## 2024-07-22 RX ADMIN — CEFEPIME 2000 MG: 2 INJECTION, POWDER, FOR SOLUTION INTRAVENOUS at 18:18

## 2024-07-22 ASSESSMENT — PAIN DESCRIPTION - LOCATION: LOCATION: HEAD

## 2024-07-22 ASSESSMENT — PAIN SCALES - GENERAL
PAINLEVEL_OUTOF10: 0
PAINLEVEL_OUTOF10: 3

## 2024-07-22 ASSESSMENT — PAIN DESCRIPTION - DESCRIPTORS: DESCRIPTORS: ACHING

## 2024-07-22 NOTE — PLAN OF CARE
Problem: Pain  Goal: Verbalizes/displays adequate comfort level or baseline comfort level  7/22/2024 0608 by Zev Webster, RN  Outcome: Progressing     Problem: Skin/Tissue Integrity  Goal: Absence of new skin breakdown  Description: 1.  Monitor for areas of redness and/or skin breakdown  2.  Assess vascular access sites hourly  3.  Every 4-6 hours minimum:  Change oxygen saturation probe site  4.  Every 4-6 hours:  If on nasal continuous positive airway pressure, respiratory therapy assess nares and determine need for appliance change or resting period.  7/22/2024 0608 by Zev Webster, RN  Outcome: Progressing     Problem: ABCDS Injury Assessment  Goal: Absence of physical injury  7/22/2024 0608 by Zev Webster, RN  Outcome: Progressing     Problem: Safety - Adult  Goal: Free from fall injury  7/22/2024 0608 by Zev Webster, RN  Outcome: Progressing

## 2024-07-22 NOTE — DISCHARGE INSTR - COC
Continuity of Care Form    Patient Name: Viral Kang   :  1970  MRN:  [id number]    Admit date:  2024  Discharge date:  ***    Code Status Order: Full Code   Advance Directives:     Admitting Physician:  Sarkis Gonzalez MD  PCP: Woody Fraga DNP    Discharging Nurse: ***  Discharging Hospital Unit/Room#: 0537/0537-01  Discharging Unit Phone Number: ***    Emergency Contact:   Extended Emergency Contact Information  Primary Emergency Contact: Idalmis Kang  Address: [address] [place] [address] [facility]  Home Phone: [phone]  Work Phone: [phone]  Mobile Phone: [phone]  Relation: Spouse  Secondary Emergency Contact: Zoraida Kang  Address: *           [place] [address]  Home Phone: [phone]  Work Phone: [phone]  Mobile Phone: [phone]  Relation: Parent    Past Surgical History:  Past Surgical History:   Procedure Laterality Date    CERVICAL FUSION N/A 2019    A.C.D.F. C 6-7 performed by Dean Cortez MD at [facility]    COLONOSCOPY N/A 2022    COLONOSCOPY POLYPECTOMY HOT BIOPSY performed by Peter Loomis MD at [facility] ENDOSCOPY    FRACTURE SURGERY Left     left hip surgical repair post fx    HIP SURGERY Left     plates    JOINT REPLACEMENT Left     hip    TONSILLECTOMY         Immunization History:   Immunization History   Administered Date(s) Administered    COVID-19, MODERNA BLUE border, Primary or Immunocompromised, (age 12y+), IM, 100 mcg/0.5mL 2021, 2021, 2022    Influenza, FLUARIX, FLULAVAL, FLUZONE (age 6 mo+) AND AFLURIA, (age 3 y+), PF, 0.5mL 2022    TDaP, ADACEL (age 10y-64y), BOOSTRIX (age 10y+), IM, 0.5mL 2019, 10/20/2022       Active Problems:  Patient Active Problem List   Diagnosis Code    Recurrent cellulitis of lower leg L03.119    Essential hypertension I10    Cervical disc herniation M50.20    Radiculopathy M54.10    Type 2 diabetes mellitus with hyperglycemia, without  long-term current use of insulin (HCC) E11.65    Family history of colon cancer in father Z80.0    Mixed hyperlipidemia E78.2    Cellulitis of left lower leg L03.116    Septic shock (HCC) A41.9, R65.21    Cellulitis of lower extremity L03.119       Isolation/Infection:   Isolation            No Isolation          Patient Infection Status       None to display                     Nurse Assessment:  Last Vital Signs: /70   Pulse 77   Temp 98.4 °F (36.9 °C) (Oral)   Resp 20   Ht 1.905 m (6' 3\")   Wt (!) 179.8 kg (396 lb 6.2 oz)   SpO2 96%   BMI 49.54 kg/m²     Last documented pain score (0-10 scale): Pain Level: 0  Last Weight:   Wt Readings from Last 1 Encounters:   07/22/24 (!) 179.8 kg (396 lb 6.2 oz)     Mental Status:  {IP PT MENTAL STATUS:20030}    IV Access:  { BULMARO IV ACCESS:525789751}    Nursing Mobility/ADLs:  Walking   {CHP DME ADLs:161520713}  Transfer  {CHP DME ADLs:707146565}  Bathing  {P DME ADLs:752849393}  Dressing  {CHP DME ADLs:110716666}  Toileting  {P DME ADLs:355034046}  Feeding  {P DME ADLs:673036890}  Med Admin  {P DME ADLs:811603811}  Med Delivery   { BULMARO MED Delivery:002075454}    Wound Care Documentation and Therapy:        Elimination:  Continence:   Bowel: {YES / NO:19727}  Bladder: {YES / NO:19727}  Urinary Catheter: {Urinary Catheter:772838372}   Colostomy/Ileostomy/Ileal Conduit: {YES / NO:19727}       Date of Last BM: ***    Intake/Output Summary (Last 24 hours) at 7/22/2024 1840  Last data filed at 7/22/2024 1100  Gross per 24 hour   Intake 597.12 ml   Output 1040 ml   Net -442.88 ml     I/O last 3 completed shifts:  In: 3207.1 [P.O.:510; I.V.:2147.7; IV Piggyback:549.4]  Out: 3465 [Urine:3465]    Safety Concerns:     { BULMARO Safety Concerns:723822860}    Impairments/Disabilities:      { BULMARO Impairments/Disabilities:525038956}    Nutrition Therapy:  Current Nutrition Therapy:   { BULMARO Diet List:690202398}    Routes of Feeding: {CHP DME Other

## 2024-07-22 NOTE — FLOWSHEET NOTE
Report given to 5C nurse taking room 0537. All questions answered. Pt transferred in [place] ICU bed at 0300 with all belongings. Pt's wife notified of transfer.

## 2024-07-22 NOTE — PLAN OF CARE
Problem: Pain  Goal: Verbalizes/displays adequate comfort level or baseline comfort level  7/22/2024 0105 by Lea Ott RN  Outcome: Progressing     Problem: Skin/Tissue Integrity  Goal: Absence of new skin breakdown  Description: 1.  Monitor for areas of redness and/or skin breakdown  2.  Assess vascular access sites hourly  3.  Every 4-6 hours minimum:  Change oxygen saturation probe site  4.  Every 4-6 hours:  If on nasal continuous positive airway pressure, respiratory therapy assess nares and determine need for appliance change or resting period.  7/22/2024 0105 by Lea Ott RN  Outcome: Progressing     Problem: ABCDS Injury Assessment  Goal: Absence of physical injury  7/22/2024 0105 by Lea Ott RN  Outcome: Progressing     Problem: Discharge Planning  Goal: Discharge to home or other facility with appropriate resources  7/22/2024 0105 by Lea Ott RN  Outcome: Progressing     Problem: Chronic Conditions and Co-morbidities  Goal: Patient's chronic conditions and co-morbidity symptoms are monitored and maintained or improved  7/22/2024 0105 by Lea Ott RN  Outcome: Progressing     Problem: Safety - Adult  Goal: Free from fall injury  7/22/2024 0105 by Lea Ott RN  Outcome: Progressing

## 2024-07-22 NOTE — H&P
[facility]  Office: [phone]  Devyn Bernard DO, Jose Hernandez DO, William Davalos DO, Tunde Khalil DO, She Hester MD, Qing Morales MD, Sammy Pearson MD, Marguerite Ellington MD,  Jose Ramon Key MD, Royce Chatterjee MD, Trae Duffy MD,  Troy Matt DO, Sarkis Gonzalez MD, Jalen Lainez MD, Derek Bernard DO, Maral Herrera MD,  Thor Schreiber DO, Mirian Felix MD, Mallkia Lynch MD, Taty English MD, Nidia Diane MD,  Ministerio Hughes MD, Umberto Belcher MD, Lucinda Bowman MD, Stephanie Prakash MD, Weston Nichols MD, Kris Vaughn MD, Jesus Jimenez DO, Abel Prtety DO, Patricia Albright MD,  Juancarlos Monte MD, Shirley Waterhouse, CNP,  Rebekah Barry CNP, Michael Pappas, CNP,  Cinthya Christianson, DANIELLE, Hiral Tripathi, CNP, Filomena Sharpe, CNP, Zenobia Tate CNP, Valentina Chong, CNP, Evelin Grullon, PA-C, Rose Bush PA-C, Eboni Pearl, CNP, Krystal Diaz, CNP, Nav Molina, CNP, Elvia Haddad, CNP, Elisa Joseph, CNP, Shweta Leahy, CNS, Alyssia Gastelum, CNP, Barbara Collins CNP, Tracy Schwab, CNP         [facility]   IN-PATIENT SERVICE   [facility]    HISTORY AND PHYSICAL EXAMINATION            Date:   7/22/2024  Patient name:  Viral Kang  Date of admission:  7/20/2024 11:14 PM  MRN:   [id number]  Account:  [id number]  YOB: 1970  PCP:    Woody Fraga DNP  Room:   [address] [place] [address]  Code Status:    Full Code    Chief Complaint:     Chief Complaint   Patient presents with    Blood Infection       History Obtained From:     patient, electronic medical record    History of Present Illness:     Viral Kang is a 53 y.o. Non- / non  male who presents with Blood Infection   and is admitted to the hospital for the management of Septic shock (HCC).    53-year-old male with known medical history of hypertension, hyperlipidemia, sleep apnea, diabetes presents to medical ICU from [facility] with chief complaint of

## 2024-07-22 NOTE — PROGRESS NOTES
[facility] - Progress Note    Today's Date and Time: 7/22/2024, 7:03 AM    Impression :   Cellulitis of left lower extremity  Concern for septicemia  KETTY  Type 2 diabetes mellitus  Venous stasis dermatitis with chronic edema    Recommendations:   D/C Vancomycin   Cefepime pending cultures  Zyvox 600 mg po BID  ASO titer  ACE wraps to left leg to decrease edema    Medical Decision Making/Summary/Discussion:7/22/2024     Daily Elements of Decision Making provided by Consulting Physician:    Note: I have independently performed the steps listed below as part of the medical decision making and evaluation.    Review of current Problems:  Today I am seeing the patient for the following problems:  Cellulitis of left lower extremity  Concern for septicemia  KETTY  Type 2 diabetes mellitus  Venous stasis dermatitis  Evaluation of Patient:  Patient evaluated and examined in the ICU.  Evaluated because of cellulitis of the lower extremity and hypotension  Please see daily details in Interval Changes Section  Changes in physical exam:  Cellulitis of the lower extremity  Please see daily details in Physical Exam section and in Interval Changes Section  Changes in ROS:  Improved  Please see daily details in Review of Symptoms section and in Interval Changes Section  Discussion with Referring Physician or Service  Dr. Gonzalez  Laboratory and Radiologic Studies with personal review of radiologic studies  Laboratory  Radiology  Microbiology  Please see Details in daily Interval Changes Section devoted to Lab, Micro and Radiology and in Medical Decision Laboratory, Imaging and Cultures sections.  Review of Infection Control and Prevention measures:  Universal precautions  Please see daily details in Infection Control Recommendations section  Administer medications as ordered:  Cefepime pending further data  Zyvox  Review of Antimicrobial Stewardship Measures:  Targeted therapy  PK dosing  Please see  input(s): \"BC\" in the last 72 hours.  Lab Results   Component Value Date/Time    BACTERIA None 07/21/2024 06:43 AM    MUCUS 1+ 07/20/2024 08:00 PM    RBC 4.67 07/20/2024 11:32 PM    RBC 5.43 04/21/2012 08:34 PM    TRICHOMONAS NOT REPORTED 05/11/2017 09:45 PM    WBC 21.9 07/20/2024 11:32 PM    YEAST NOT REPORTED 05/11/2017 09:45 PM    TURBIDITY Clear 07/21/2024 06:43 AM     Lab Results   Component Value Date/Time    CREATININE 1.5 07/21/2024 02:23 AM    GLUCOSE 161 07/21/2024 02:23 AM    GLUCOSE 114 04/21/2012 08:34 PM       Medical Decision Making-Imaging:   XR CHEST PORTABLE    Result Date: 7/20/2024  EXAM: XR CHEST PORTABLE HISTORY: . Shortness of breath with exertion . COMPARISON: 12/31/2023 TECHNIQUE: Single view of the chest. FINDINGS: This is an expiratory chest. Heart and vascularity are unremarkable. Lungs are free of focal infiltrates. Old granulomatous disease is noted.     Impression: 1. Expiratory chest. 2. No acute heart or lung disease identified.       Medical Decision Pyqfin-Uebejlwz-Qvsxc:     Results       Procedure Component Value Units Date/Time    Infectious Disease Intervention [[id number]]     Order Status: Sent     Culture, Urine [[id number]] Collected: 07/21/24 0643    Order Status: Sent Specimen: Urine, clean catch Updated: 07/21/24 0643    Culture, Blood 1 [[id number]] Collected: 07/21/24 0514    Order Status: Completed Specimen: Blood Updated: 07/22/24 0544     Specimen Description .BLOOD     Special Requests          Culture NO GROWTH 1 DAY    Culture, Blood 1 [[id number]] Collected: 07/21/24 0514    Order Status: Completed Specimen: Blood Updated: 07/22/24 0543     Specimen Description .BLOOD     Special Requests          Culture NO GROWTH 1 DAY    MRSA DNA Probe, Nasal [[id number]] Collected: 07/21/24 0130    Order Status: Completed Specimen: Nasal Updated: 07/21/24 0947     Specimen Description .NASAL SWAB     MRSA, DNA, Nasal NEGATIVE     Comment: NEGATIVE:  MRSA DNA not detected by

## 2024-07-22 NOTE — PLAN OF CARE
Problem: Pain  Goal: Verbalizes/displays adequate comfort level or baseline comfort level  7/22/2024 1854 by Symone Montgomrey RN  Outcome: Progressing  7/22/2024 0608 by Zev Webster RN  Outcome: Progressing     Problem: Skin/Tissue Integrity  Goal: Absence of new skin breakdown  Description: 1.  Monitor for areas of redness and/or skin breakdown  2.  Assess vascular access sites hourly  3.  Every 4-6 hours minimum:  Change oxygen saturation probe site  4.  Every 4-6 hours:  If on nasal continuous positive airway pressure, respiratory therapy assess nares and determine need for appliance change or resting period.  7/22/2024 1854 by Symone Montgomery RN  Outcome: Progressing  7/22/2024 0608 by Zev Webster RN  Outcome: Progressing     Problem: ABCDS Injury Assessment  Goal: Absence of physical injury  7/22/2024 1854 by Symone Montgomery RN  Outcome: Progressing  7/22/2024 0608 by Zev Webster RN  Outcome: Progressing     Problem: Discharge Planning  Goal: Discharge to home or other facility with appropriate resources  Outcome: Progressing     Problem: Chronic Conditions and Co-morbidities  Goal: Patient's chronic conditions and co-morbidity symptoms are monitored and maintained or improved  Outcome: Progressing     Problem: Safety - Adult  Goal: Free from fall injury  7/22/2024 1854 by Symone Montgomery RN  Outcome: Progressing  7/22/2024 0608 by Zev Webster RN  Outcome: Progressing     Problem: Respiratory - Adult  Goal: Achieves optimal ventilation and oxygenation  Outcome: Progressing     Problem: Skin/Tissue Integrity - Adult  Goal: Skin integrity remains intact  Outcome: Progressing  Flowsheets (Taken 7/22/2024 0800 by Sydnee Stacy, RN)  Skin Integrity Remains Intact:   Monitor for areas of redness and/or skin breakdown   Assess vascular access sites hourly   Every 4-6 hours minimum: Change oxygen saturation probe site   Every 4-6 hours: If on nasal continuous positive airway

## 2024-07-22 NOTE — PROGRESS NOTES
Occupational Therapy  Facility/Department: [address] STEPDOWN  Occupational Therapy Initial Assessment    Name: Viral Kang  : 1970  MRN: [id number]  Date of Service: 2024    Discharge Recommendations:     OT Equipment Recommendations  Equipment Needed: No     Chief Complaint   Patient presents with    Blood Infection       Patient Diagnosis(es): The primary encounter diagnosis was Cellulitis of left lower extremity. A diagnosis of Septic shock (HCC) was also pertinent to this visit.  Past Medical History:  has a past medical history of Arthritis, Hypertension, Mixed hyperlipidemia, Type 2 diabetes mellitus without complication, without long-term current use of insulin (HCC), and Unspecified sleep apnea.  Past Surgical History:  has a past surgical history that includes hip surgery (Left); Tonsillectomy; joint replacement (Left, ); fracture surgery (Left, ); cervical fusion (N/A, 2019); and Colonoscopy (N/A, 2022).           Assessment   Performance deficits / Impairments: Decreased functional mobility ;Decreased ADL status;Decreased safe awareness;Decreased cognition;Decreased balance;Decreased high-level IADLs  Assessment: Pt completes bed mobility from a flat bed using bedrail with [place] at the trunk; patient has a bedrail available at home. VC required for use of pursed lip breathing to manage SOB with good outcome, O2 remained WFL. Pt sits EOB x 30 minutes while engaged in UE assessment, LB and UB dressing and therapeutic interviewing with Mod I. Pt able to don/doff hospital gown while sitting EOB Mod I and requires MaxA to manage footies despite strong effort. Pt completes transfers and functional mobility using bariatric RW with [place] for decreased stand balance and VC for RW safety. Pt stands x ~1 minute with CGA at RW level.  Prognosis: Good  Decision Making: Medium Complexity  REQUIRES OT FOLLOW-UP: Yes  Activity Tolerance  Activity Tolerance: Patient Tolerated treatment well

## 2024-07-23 LAB
ANION GAP SERPL CALCULATED.3IONS-SCNC: 11 MMOL/L (ref 9–16)
BASOPHILS # BLD: 0.03 K/UL (ref 0–0.2)
BASOPHILS NFR BLD: 0 % (ref 0–2)
BUN SERPL-MCNC: 15 MG/DL (ref 6–20)
CALCIUM SERPL-MCNC: 8.8 MG/DL (ref 8.6–10.4)
CHLORIDE SERPL-SCNC: 98 MMOL/L (ref 98–107)
CO2 SERPL-SCNC: 23 MMOL/L (ref 20–31)
CREAT SERPL-MCNC: 0.9 MG/DL (ref 0.7–1.2)
EOSINOPHIL # BLD: 0.08 K/UL (ref 0–0.44)
EOSINOPHILS RELATIVE PERCENT: 1 % (ref 1–4)
ERYTHROCYTE [DISTWIDTH] IN BLOOD BY AUTOMATED COUNT: 13.1 % (ref 11.8–14.4)
GFR, ESTIMATED: >90 ML/MIN/1.73M2
GLUCOSE BLD-MCNC: 152 MG/DL (ref 75–110)
GLUCOSE BLD-MCNC: 201 MG/DL (ref 75–110)
GLUCOSE BLD-MCNC: 210 MG/DL (ref 75–110)
GLUCOSE BLD-MCNC: 260 MG/DL (ref 75–110)
GLUCOSE SERPL-MCNC: 154 MG/DL (ref 74–99)
HCT VFR BLD AUTO: 39.1 % (ref 40.7–50.3)
HGB BLD-MCNC: 13.2 G/DL (ref 13–17)
IMM GRANULOCYTES # BLD AUTO: 0.05 K/UL (ref 0–0.3)
IMM GRANULOCYTES NFR BLD: 1 %
LYMPHOCYTES NFR BLD: 1.15 K/UL (ref 1.1–3.7)
LYMPHOCYTES RELATIVE PERCENT: 12 % (ref 24–43)
MCH RBC QN AUTO: 29.7 PG (ref 25.2–33.5)
MCHC RBC AUTO-ENTMCNC: 33.8 G/DL (ref 28.4–34.8)
MCV RBC AUTO: 87.9 FL (ref 82.6–102.9)
MONOCYTES NFR BLD: 0.76 K/UL (ref 0.1–1.2)
MONOCYTES NFR BLD: 8 % (ref 3–12)
NEUTROPHILS NFR BLD: 78 % (ref 36–65)
NEUTS SEG NFR BLD: 7.37 K/UL (ref 1.5–8.1)
NRBC BLD-RTO: 0 PER 100 WBC
PLATELET # BLD AUTO: 119 K/UL (ref 138–453)
PMV BLD AUTO: 9.3 FL (ref 8.1–13.5)
POTASSIUM SERPL-SCNC: 3.8 MMOL/L (ref 3.7–5.3)
RBC # BLD AUTO: 4.45 M/UL (ref 4.21–5.77)
SODIUM SERPL-SCNC: 132 MMOL/L (ref 136–145)
WBC OTHER # BLD: 9.4 K/UL (ref 3.5–11.3)

## 2024-07-23 PROCEDURE — 2580000003 HC RX 258

## 2024-07-23 PROCEDURE — 97530 THERAPEUTIC ACTIVITIES: CPT

## 2024-07-23 PROCEDURE — 6360000002 HC RX W HCPCS

## 2024-07-23 PROCEDURE — 85025 COMPLETE CBC W/AUTO DIFF WBC: CPT

## 2024-07-23 PROCEDURE — 1200000000 HC SEMI PRIVATE

## 2024-07-23 PROCEDURE — APPSS30 APP SPLIT SHARED TIME 16-30 MINUTES: Performed by: NURSE PRACTITIONER

## 2024-07-23 PROCEDURE — 97161 PT EVAL LOW COMPLEX 20 MIN: CPT

## 2024-07-23 PROCEDURE — 6370000000 HC RX 637 (ALT 250 FOR IP): Performed by: STUDENT IN AN ORGANIZED HEALTH CARE EDUCATION/TRAINING PROGRAM

## 2024-07-23 PROCEDURE — 99232 SBSQ HOSP IP/OBS MODERATE 35: CPT | Performed by: STUDENT IN AN ORGANIZED HEALTH CARE EDUCATION/TRAINING PROGRAM

## 2024-07-23 PROCEDURE — 82947 ASSAY GLUCOSE BLOOD QUANT: CPT

## 2024-07-23 PROCEDURE — 80048 BASIC METABOLIC PNL TOTAL CA: CPT

## 2024-07-23 PROCEDURE — 6370000000 HC RX 637 (ALT 250 FOR IP)

## 2024-07-23 RX ORDER — LINEZOLID 600 MG/1
600 TABLET, FILM COATED ORAL EVERY 12 HOURS SCHEDULED
Status: DISCONTINUED | OUTPATIENT
Start: 2024-07-23 | End: 2024-07-25

## 2024-07-23 RX ADMIN — LINEZOLID 600 MG: 600 TABLET, FILM COATED ORAL at 21:17

## 2024-07-23 RX ADMIN — CEFEPIME 2000 MG: 2 INJECTION, POWDER, FOR SOLUTION INTRAVENOUS at 01:14

## 2024-07-23 RX ADMIN — ACETAMINOPHEN 650 MG: 325 TABLET ORAL at 21:17

## 2024-07-23 RX ADMIN — CEFEPIME 2000 MG: 2 INJECTION, POWDER, FOR SOLUTION INTRAVENOUS at 11:02

## 2024-07-23 RX ADMIN — CEFEPIME 2000 MG: 2 INJECTION, POWDER, FOR SOLUTION INTRAVENOUS at 18:48

## 2024-07-23 RX ADMIN — HEPARIN SODIUM 5000 UNITS: 5000 INJECTION INTRAVENOUS; SUBCUTANEOUS at 21:19

## 2024-07-23 RX ADMIN — HEPARIN SODIUM 5000 UNITS: 5000 INJECTION INTRAVENOUS; SUBCUTANEOUS at 05:20

## 2024-07-23 RX ADMIN — SODIUM CHLORIDE, PRESERVATIVE FREE 10 ML: 5 INJECTION INTRAVENOUS at 07:54

## 2024-07-23 RX ADMIN — HEPARIN SODIUM 5000 UNITS: 5000 INJECTION INTRAVENOUS; SUBCUTANEOUS at 14:08

## 2024-07-23 RX ADMIN — ATORVASTATIN CALCIUM 80 MG: 80 TABLET, FILM COATED ORAL at 07:54

## 2024-07-23 RX ADMIN — INSULIN LISPRO 1 UNITS: 100 INJECTION, SOLUTION INTRAVENOUS; SUBCUTANEOUS at 16:51

## 2024-07-23 RX ADMIN — INSULIN LISPRO 1 UNITS: 100 INJECTION, SOLUTION INTRAVENOUS; SUBCUTANEOUS at 12:58

## 2024-07-23 RX ADMIN — LINEZOLID 600 MG: 600 TABLET, FILM COATED ORAL at 10:51

## 2024-07-23 ASSESSMENT — PAIN SCALES - GENERAL
PAINLEVEL_OUTOF10: 6
PAINLEVEL_OUTOF10: 4

## 2024-07-23 ASSESSMENT — PAIN DESCRIPTION - LOCATION
LOCATION: LEG
LOCATION: NECK

## 2024-07-23 ASSESSMENT — PAIN - FUNCTIONAL ASSESSMENT
PAIN_FUNCTIONAL_ASSESSMENT: ACTIVITIES ARE NOT PREVENTED
PAIN_FUNCTIONAL_ASSESSMENT: ACTIVITIES ARE NOT PREVENTED

## 2024-07-23 ASSESSMENT — PAIN DESCRIPTION - DESCRIPTORS: DESCRIPTORS: DISCOMFORT

## 2024-07-23 ASSESSMENT — PAIN DESCRIPTION - ORIENTATION: ORIENTATION: MID

## 2024-07-23 NOTE — CARE COORDINATION
Transitional planning-talked with patient. Plan is to go home with his wife, daughter, and grand daughter. Has ride. Waiting on ASO titer to determine antibiotics.

## 2024-07-23 NOTE — PLAN OF CARE
Problem: Pain  Goal: Verbalizes/displays adequate comfort level or baseline comfort level  7/22/2024 2037 by Zeke Berumen, RN  Outcome: Progressing     Problem: Skin/Tissue Integrity  Goal: Absence of new skin breakdown  Description: 1.  Monitor for areas of redness and/or skin breakdown  2.  Assess vascular access sites hourly  3.  Every 4-6 hours minimum:  Change oxygen saturation probe site  4.  Every 4-6 hours:  If on nasal continuous positive airway pressure, respiratory therapy assess nares and determine need for appliance change or resting period.  7/22/2024 2037 by Zeke Berumen, RN  Outcome: Progressing     Problem: Skin/Tissue Integrity - Adult  Goal: Incisions, wounds, or drain sites healing without S/S of infection  7/22/2024 2037 by Zeke Berumen RN  Outcome: Progressing     Problem: ABCDS Injury Assessment  Goal: Absence of physical injury  7/22/2024 2037 by Zeke Berumen RN  Outcome: Progressing     Problem: Discharge Planning  Goal: Discharge to home or other facility with appropriate resources  7/22/2024 2037 by Zeke Berumen, RN  Outcome: Progressing     Problem: Chronic Conditions and Co-morbidities  Goal: Patient's chronic conditions and co-morbidity symptoms are monitored and maintained or improved  7/22/2024 2037 by Zeke Berumen RN  Outcome: Progressing     Problem: Safety - Adult  Goal: Free from fall injury  7/22/2024 2037 by Zeke Berumen, RN  Outcome: Progressing

## 2024-07-23 NOTE — PROGRESS NOTES
[facility] - Progress Note    Today's Date and Time: 7/23/2024, 12:51 PM    Impression :   Cellulitis of left lower extremity  Concern for septicemia  KETTY  Type 2 diabetes mellitus  Venous stasis dermatitis with chronic edema    Recommendations:   D/C Vancomycin   Cefepime pending cultures  Zyvox 600 mg po BID  ASO titer  ACE wraps to left leg to decrease edema    Medical Decision Making/Summary/Discussion:7/23/2024       Infection Control Recommendations   Universal Precautions  Wound     Antimicrobial Stewardship Recommendations     Simplification of therapy  Targeted therapy  PK dosing    Coordination of Outpatient Care:   Estimated Length of IV antimicrobials:TBD  Patient will need Midline Catheter Insertion: TBD  Patient will need PICC line Insertion:TBD  Patient will need: Home IV , Infusion Center,  SNF,  LTAC: TBD  Patient will need outpatient wound care: yes    Chief complaint/reason for consultation:   Cellulitis left lower extremity  Concern for septicemia      History of Present Illness:   Viral Kang is a 53 y.o.-year-old  male who was initially admitted on 7/20/2024. Patient seen at the request of Dr. Lynch.    INITIAL HISTORY:    Patient presented to ED at [facility] on 07/20/24 with redness and swelling of his left lower leg.      Patient has a history of cellulitis of his left lower extremity in the past which required IV antibiotics and hospitalization.  Patient felt fatigued over the past day.  Pain and nausea described as being severe. Patient was transferred to [facility] 07/20/24 due to concerns of sepsis.     Patient's initial lactic acid at outlying facility 2.4, white blood cell count 17.4, blood and urine cultures drawn, chest x-ray shows no acute cardiopulmonary abnormality       CURRENT EVALUATION- DAILY INTERVAL CHANGES 7/23/2024  /75   Pulse 76   Temp 99 °F (37.2 °C) (Oral)   Resp 18    Making-Imaging:   XR CHEST PORTABLE    Result Date: 7/20/2024  EXAM: XR CHEST PORTABLE HISTORY: . Shortness of breath with exertion . COMPARISON: 12/31/2023 TECHNIQUE: Single view of the chest. FINDINGS: This is an expiratory chest. Heart and vascularity are unremarkable. Lungs are free of focal infiltrates. Old granulomatous disease is noted.     Impression: 1. Expiratory chest. 2. No acute heart or lung disease identified.       Medical Decision Rsezxr-[id number]-Sfgfe:     Results       Procedure Component Value Units Date/Time    Infectious Disease Intervention [[id number]] Collected: 07/21/24 1630    Order Status: Completed Updated: 07/22/24 0808     Intervention De-escalation    Culture, Urine [[id number]] Collected: 07/21/24 0643    Order Status: Completed Specimen: Urine, clean catch Updated: 07/22/24 0806     Specimen Description .CLEAN CATCH URINE     Special Requests Site: Urine     Culture NO GROWTH    Culture, Blood 1 [[id number]] Collected: 07/21/24 0514    Order Status: Completed Specimen: Blood Updated: 07/23/24 0544     Specimen Description .BLOOD     Special Requests          Culture NO GROWTH 2 DAYS    Culture, Blood 1 [[id number]] Collected: 07/21/24 0514    Order Status: Completed Specimen: Blood Updated: 07/23/24 0543     Specimen Description .BLOOD     Special Requests          Culture NO GROWTH 2 DAYS    MRSA DNA Probe, Nasal [[id number]] Collected: 07/21/24 0130    Order Status: Completed Specimen: Nasal Updated: 07/21/24 0947     Specimen Description .NASAL SWAB     MRSA, DNA, Nasal NEGATIVE     Comment: NEGATIVE:  MRSA DNA not detected by nucleic acid amplification.                                                    Results should be used as an adjunct to nosocomial control efforts to identify patients   needing enhanced precautions.    The test is not intended to identify patients with staphylococcal infections.  Results   should not be used to guide or monitor treatment for MRSA

## 2024-07-23 NOTE — PROGRESS NOTES
[facility]    [facility]   IN-PATIENT SERVICE   [facility]    Progress Note    7/23/2024    5:20 PM    Name:   Viral Kang  MRN:     [id number]     Acct:      [id number]   Room:   0537/0537-01   Day:  2  Admit Date:  7/20/2024 11:14 PM    PCP:   Woody Fraga DNP  Code Status:  Full Code    Subjective:     C/C:   Chief Complaint   Patient presents with    Blood Infection     Interval History Status: improved.     Seen at bedside, hemodynamically stable  No acute events overnight, no new complaints  Currently on cefepime, Zyvox, awaiting ASO titer, ID service following  DVT scan negative    Brief History:     Per my colleague     \"Viral Kang is a 53 y.o. Non- / non  male who presents with Blood Infection   and is admitted to the hospital for the management of Septic shock (HCC).     53-year-old male with known medical history of hypertension, hyperlipidemia, sleep apnea, diabetes presents to medical ICU from [facility] with chief complaint of redness, swelling of left leg.  He started feeling fatigued and weak and had to go to the ER due to pain and redness.  When he arrived in the ER he was noted to have hypotensive, patient was not responsive to fluid boluses and had been restarted on Levophed.  Patient was life flighted to ICU.  Lactic acid was 3.1.  White count increased to 21.9.  Patient was started on broad-spectrum antibiotics.  Patient will remain off pressors and transferred to floor.  ID was consulted.  Patient reports that he has had cellulitis in his legs twice before.  He does not feel that the previous episode got completely better.  He works as a  but states that he takes frequent breaks and does not travel on long distances.\"    Medications:     Allergies:    Allergies   Allergen Reactions    Bactrim Nausea And Vomiting       Current Meds:   Scheduled Meds:    linezolid  600 mg Oral 2 times per day    cefepime   \"THGBART\", \"THB\", \"LIE5UGR\", \"QRTQ5XZX\", \"Z7PSBHON\", \"O2SAT\", \"FIO2\"  Lab Results   Component Value Date/Time    SPECIAL Site: Urine 07/21/2024 06:43 AM     Lab Results   Component Value Date/Time    CULTURE NO GROWTH 07/21/2024 06:43 AM       Radiology:  XR CHEST PORTABLE    Result Date: 7/20/2024  Impression: 1. Expiratory chest. 2. No acute heart or lung disease identified.       Physical Examination:        General appearance:  alert, cooperative and no distress  Mental Status:  oriented to person, place and time and normal affect  Lungs:  clear to auscultation bilaterally, normal effort  Heart:  regular rate and rhythm, no murmur  Abdomen:  soft, nontender, nondistended, normal bowel sounds, no masses, hepatomegaly, splenomegaly  Extremities: Chronic edema, left extremity cellulitis changes, in Ace wrap currently,   skin:  no gross lesions, rashes, induration except above    Assessment:        Hospital Problems             Last Modified POA    * (Principal) Septic shock (HCC) 7/21/2024 Yes    Recurrent cellulitis of lower leg 7/23/2024 Yes    Essential hypertension 7/23/2024 Yes    Type 2 diabetes mellitus with hyperglycemia, without long-term current use of insulin ([facility]) 7/22/2024 Yes    Mixed hyperlipidemia 7/23/2024 Yes    Cellulitis of lower extremity 7/21/2024 Yes       Plan:        -Continue cefepime, Zyvox, pending ASO titers, ID service following, follow recommendations  -Continue Ace wrap's  -DVT scan negative  -Continue sliding scale, POCT glucose checks, hypoglycemia protocol  -Continue home dose Lipitor, resume home dose antihypertensives as tolerated  -KETTY resolved, continue to monitor kidney function, urine output  -Home CPAP    Lucinda Clement Sra, MD  7/23/2024  5:20 PM

## 2024-07-23 NOTE — PROGRESS NOTES
Patients platelets 119. Paged Dr. Bowman to verify if okay to give sub q heparin.     Per Dr. Bowman okay to give.

## 2024-07-23 NOTE — PROGRESS NOTES
Physical Therapy  Facility/Department: [address] STEPDOWN  Physical Therapy Initial Assessment    Name: Viral Kang  : 1970  MRN: [id number]  Date of Service: 2024    Patient presented to [facility] on 2024 with a chief complaints of redness and swelling of his left leg. The patient has had history of cellulitis of his left lower extremity as well as right lower extremity in the past and has required IV antibiotics and hospitalization before. According to patient, he started feeling fatigued and weak in the morning and then realized that he had to go to ER for his management.     Discharge Recommendations:  No therapy recommended at discharge, Home independently   PT Equipment Recommendations  Equipment Needed: No      Patient Diagnosis(es): The primary encounter diagnosis was Cellulitis of left lower extremity. A diagnosis of Septic shock (HCC) was also pertinent to this visit.  Past Medical History:  has a past medical history of Arthritis, Hypertension, Mixed hyperlipidemia, Type 2 diabetes mellitus without complication, without long-term current use of insulin (HCC), and Unspecified sleep apnea.  Past Surgical History:  has a past surgical history that includes hip surgery (Left); Tonsillectomy; joint replacement (Left, ); fracture surgery (Left, ); cervical fusion (N/A, 2019); and Colonoscopy (N/A, 2022).    Assessment      No Skilled PT: Independent with functional mobility   Requires PT Follow-Up: No  Activity Tolerance  Activity Tolerance: Patient tolerated treatment well     Plan   Physical Therapy Plan  General Plan: Discharge  Safety Devices  Type of Devices: Call light within reach, Left in chair, Nurse notified  Restraints  Restraints Initially in Place: No     Restrictions  Restrictions/Precautions  Restrictions/Precautions: ROM Restrictions, Up as Tolerated  Required Braces or Orthoses?: No  Position Activity Restriction  Other position/activity restrictions:  Functional Limits  Hearing  Hearing: Within functional limits    Cognition   Orientation  Overall Orientation Status: Within Normal Limits  Orientation Level: Oriented X4  Cognition  Overall Cognitive Status: WFL  Arousal/Alertness: Appears intact  Following Commands: Follows all commands without difficulty  Attention Span: Appears intact  Memory: Appears intact  Safety Judgement: Appears intact  Problem Solving: Appears intact  Insights: Appears intact  Initiation: Appears intact  Sequencing: Appears intact     Objective   Temp: 98.2 °F (36.8 °C)  Pulse: 77  Heart Rate Source: Monitor  Respirations: 18  SpO2: 95 %  O2 Device: None (Room air)  BP: 121/75  MAP (Calculated): 90  BP Location: Left upper arm  BP Method: Automatic  Patient Position: Semi fowlers     Observation/Palpation  Posture: Good        PROM RLE (degrees)  RLE PROM: WFL  AROM RLE (degrees)  RLE AROM: WFL  PROM LLE (degrees)  LLE PROM: WFL  AROM LLE (degrees)  LLE AROM :WFL  PROM RUE (degrees)  RUE PROM: WFL  AROM RUE (degrees)  RUE AROM : WFL  PROM LUE (degrees)  LUE PROM: WFL  AROM LUE (degrees)  LUE AROM : WFL  Strength RLE  Strength RLE: WFL  Strength LLE  Strength LLE: WFL  Strength RUE  Strength RUE: WFL  Strength LUE  Strength LUE: WFL           Bed mobility  Rolling to Left: Independent  Rolling to Right: Independent  Supine to Sit: Independent  Sit to Supine: Independent  Scooting: Independent  Transfers  Sit to Stand: Independent  Stand to Sit: Independent  Bed to Chair: Independent  Stand Pivot Transfers: Independent  Ambulation  Surface: Level tile  Device: Rolling Walker  Assistance: Independent  Gait Deviations: Slow [place];Decreased step length;Decreased step height;Decreased arm swing  Distance: 500'  More Ambulation?: No  Stairs/Curb  Stairs?: Yes  Stairs  # Steps : 10  Rails: Right ascending  Device: No Device  Assistance: Independent  Pt able to go foot over foot, but prefers to karen time up and down.      Balance  Posture:

## 2024-07-24 LAB
ANION GAP SERPL CALCULATED.3IONS-SCNC: 8 MMOL/L (ref 9–16)
ASO AB SERPL-ACNC: 258 IU/ML (ref 0–150)
BASOPHILS # BLD: 0.03 K/UL (ref 0–0.2)
BASOPHILS NFR BLD: 0 % (ref 0–2)
BUN SERPL-MCNC: 14 MG/DL (ref 6–20)
CALCIUM SERPL-MCNC: 8.5 MG/DL (ref 8.6–10.4)
CHLORIDE SERPL-SCNC: 100 MMOL/L (ref 98–107)
CO2 SERPL-SCNC: 24 MMOL/L (ref 20–31)
CREAT SERPL-MCNC: 0.9 MG/DL (ref 0.7–1.2)
EOSINOPHIL # BLD: 0.1 K/UL (ref 0–0.44)
EOSINOPHILS RELATIVE PERCENT: 1 % (ref 1–4)
ERYTHROCYTE [DISTWIDTH] IN BLOOD BY AUTOMATED COUNT: 13.3 % (ref 11.8–14.4)
GFR, ESTIMATED: >90 ML/MIN/1.73M2
GLUCOSE BLD-MCNC: 175 MG/DL (ref 75–110)
GLUCOSE BLD-MCNC: 199 MG/DL (ref 75–110)
GLUCOSE BLD-MCNC: 205 MG/DL (ref 75–110)
GLUCOSE BLD-MCNC: 232 MG/DL (ref 75–110)
GLUCOSE SERPL-MCNC: 163 MG/DL (ref 74–99)
HCT VFR BLD AUTO: 38.3 % (ref 40.7–50.3)
HGB BLD-MCNC: 12.6 G/DL (ref 13–17)
IMM GRANULOCYTES # BLD AUTO: 0.04 K/UL (ref 0–0.3)
IMM GRANULOCYTES NFR BLD: 1 %
LYMPHOCYTES NFR BLD: 1.21 K/UL (ref 1.1–3.7)
LYMPHOCYTES RELATIVE PERCENT: 16 % (ref 24–43)
MCH RBC QN AUTO: 29.6 PG (ref 25.2–33.5)
MCHC RBC AUTO-ENTMCNC: 32.9 G/DL (ref 28.4–34.8)
MCV RBC AUTO: 89.9 FL (ref 82.6–102.9)
MONOCYTES NFR BLD: 0.78 K/UL (ref 0.1–1.2)
MONOCYTES NFR BLD: 11 % (ref 3–12)
NEUTROPHILS NFR BLD: 71 % (ref 36–65)
NEUTS SEG NFR BLD: 5.23 K/UL (ref 1.5–8.1)
NRBC BLD-RTO: 0 PER 100 WBC
PLATELET # BLD AUTO: 115 K/UL (ref 138–453)
PMV BLD AUTO: 9 FL (ref 8.1–13.5)
POTASSIUM SERPL-SCNC: 3.8 MMOL/L (ref 3.7–5.3)
RBC # BLD AUTO: 4.26 M/UL (ref 4.21–5.77)
SODIUM SERPL-SCNC: 132 MMOL/L (ref 136–145)
WBC OTHER # BLD: 7.4 K/UL (ref 3.5–11.3)

## 2024-07-24 PROCEDURE — 2580000003 HC RX 258

## 2024-07-24 PROCEDURE — 82947 ASSAY GLUCOSE BLOOD QUANT: CPT

## 2024-07-24 PROCEDURE — 1200000000 HC SEMI PRIVATE

## 2024-07-24 PROCEDURE — 80048 BASIC METABOLIC PNL TOTAL CA: CPT

## 2024-07-24 PROCEDURE — 6370000000 HC RX 637 (ALT 250 FOR IP): Performed by: STUDENT IN AN ORGANIZED HEALTH CARE EDUCATION/TRAINING PROGRAM

## 2024-07-24 PROCEDURE — 97535 SELF CARE MNGMENT TRAINING: CPT

## 2024-07-24 PROCEDURE — 99232 SBSQ HOSP IP/OBS MODERATE 35: CPT | Performed by: STUDENT IN AN ORGANIZED HEALTH CARE EDUCATION/TRAINING PROGRAM

## 2024-07-24 PROCEDURE — 6370000000 HC RX 637 (ALT 250 FOR IP)

## 2024-07-24 PROCEDURE — 85025 COMPLETE CBC W/AUTO DIFF WBC: CPT

## 2024-07-24 PROCEDURE — 86063 ANTISTREPTOLYSIN O SCREEN: CPT

## 2024-07-24 PROCEDURE — 6360000002 HC RX W HCPCS

## 2024-07-24 PROCEDURE — 99232 SBSQ HOSP IP/OBS MODERATE 35: CPT | Performed by: INTERNAL MEDICINE

## 2024-07-24 PROCEDURE — 36415 COLL VENOUS BLD VENIPUNCTURE: CPT

## 2024-07-24 RX ORDER — MINERAL OIL/HYDROPHIL PETROLAT
OINTMENT (GRAM) TOPICAL 2 TIMES DAILY
Status: DISCONTINUED | OUTPATIENT
Start: 2024-07-24 | End: 2024-07-25 | Stop reason: HOSPADM

## 2024-07-24 RX ADMIN — WHITE PETROLATUM: 1.75 OINTMENT TOPICAL at 21:55

## 2024-07-24 RX ADMIN — INSULIN LISPRO 1 UNITS: 100 INJECTION, SOLUTION INTRAVENOUS; SUBCUTANEOUS at 12:30

## 2024-07-24 RX ADMIN — CEFEPIME 2000 MG: 2 INJECTION, POWDER, FOR SOLUTION INTRAVENOUS at 08:55

## 2024-07-24 RX ADMIN — CEFEPIME 2000 MG: 2 INJECTION, POWDER, FOR SOLUTION INTRAVENOUS at 03:03

## 2024-07-24 RX ADMIN — HEPARIN SODIUM 5000 UNITS: 5000 INJECTION INTRAVENOUS; SUBCUTANEOUS at 14:03

## 2024-07-24 RX ADMIN — ATORVASTATIN CALCIUM 80 MG: 80 TABLET, FILM COATED ORAL at 08:03

## 2024-07-24 RX ADMIN — HEPARIN SODIUM 5000 UNITS: 5000 INJECTION INTRAVENOUS; SUBCUTANEOUS at 21:56

## 2024-07-24 RX ADMIN — CEFEPIME 2000 MG: 2 INJECTION, POWDER, FOR SOLUTION INTRAVENOUS at 18:02

## 2024-07-24 RX ADMIN — INSULIN LISPRO 1 UNITS: 100 INJECTION, SOLUTION INTRAVENOUS; SUBCUTANEOUS at 17:55

## 2024-07-24 RX ADMIN — LINEZOLID 600 MG: 600 TABLET, FILM COATED ORAL at 21:55

## 2024-07-24 RX ADMIN — HEPARIN SODIUM 5000 UNITS: 5000 INJECTION INTRAVENOUS; SUBCUTANEOUS at 06:31

## 2024-07-24 RX ADMIN — LINEZOLID 600 MG: 600 TABLET, FILM COATED ORAL at 08:53

## 2024-07-24 NOTE — PROGRESS NOTES
Occupational Therapy  Facility/Department: [facility] 5C STEPDOWN   Daily Treatment Note  Patient Name: Viral Kang        MRN: [id number]    : 1970    Date of Service: 2024    Discharge Recommendations       OT Equipment Recommendations  Equipment Needed: No    Assessment  Performance deficits / Impairments: Decreased functional mobility , Decreased ADL status, Decreased safe awareness, Decreased cognition, Decreased balance, Decreased high-level IADLs  Prognosis: Good  Decision Making: Medium Complexity  REQUIRES OT FOLLOW-UP: Yes  Activity Tolerance  Activity Tolerance: Patient Tolerated treatment well  Safety Devices  Type of Devices: Call light within reach, Left in chair, Nurse notified  Restraints  Restraints Initially in Place: No    Restrictions/Precautions  Restrictions/Precautions  Restrictions/Precautions: ROM Restrictions, Up as Tolerated  Required Braces or Orthoses?: No  Position Activity Restriction  Other position/activity restrictions: up with assist    Subjective  General  Patient assessed for rehabilitation services?: Yes  Family / Caregiver Present: No  Diagnosis: Septic Shock; Cellulitis of the L LE  General Comment  Comments: RN okayd tx. Pt agreeable, pleasant and cooperative. Pt is talkative and requires some redirection to attend to therapeutic tasks. No c/o pain this afternoon.  Pain Screening  Pain at present: 0    Objective  Orientation  Overall Orientation Status: Within Normal Limits  Orientation Level: Oriented X4  Cognition  Overall Cognitive Status: WFL  Following Commands: Follows all commands without difficulty    Activities of Daily Living  Feeding: Independent  Feeding Skilled Clinical Factors: demonstrates hand to mouth patterning and drinks from a styrofoam cup with no difficulty  Grooming: Modified independent   Grooming Skilled Clinical Factors: Stood at sink for oral care- MI level, RW as needed.  UE Bathing: Modified independent , Based on clinical judgement  UE  Treatment Minutes: 57 Minutes      Electronically signed by PAUL Feng on 7/24/24 at 3:51 PM EDT

## 2024-07-24 NOTE — PROGRESS NOTES
@[facility]@    [facility]   IN-PATIENT SERVICE   [facility]    Progress Note    7/24/2024    4:22 PM    Name:   Viral Kang  MRN:     [id number]     Acct:      [id number]   Room:   0537/0537-01   Day:  3  Admit Date:  7/20/2024 11:14 PM    PCP:   Woody Fraga DNP  Code Status:  Full Code    Subjective:     C/C:   Chief Complaint   Patient presents with    Blood Infection     Interval History Status: improved.     Seen at bedside, hemodynamically stable  No acute events overnight, no new complaints  Currently on cefepime, Zyvox, elevated ASO titer,, ID service following      Brief History:     Per my colleague     \"Viral Kang is a 53 y.o. Non- / non  male who presents with Blood Infection   and is admitted to the hospital for the management of Septic shock (HCC).     53-year-old male with known medical history of hypertension, hyperlipidemia, sleep apnea, diabetes presents to medical ICU from [facility] with chief complaint of redness, swelling of left leg.  He started feeling fatigued and weak and had to go to the ER due to pain and redness.  When he arrived in the ER he was noted to have hypotensive, patient was not responsive to fluid boluses and had been restarted on Levophed.  Patient was life flighted to ICU.  Lactic acid was 3.1.  White count increased to 21.9.  Patient was started on broad-spectrum antibiotics.  Patient will remain off pressors and transferred to floor.  ID was consulted.  Patient reports that he has had cellulitis in his legs twice before.  He does not feel that the previous episode got completely better.  He works as a  but states that he takes frequent breaks and does not travel on long distances.\"    DVT scan negative    Medications:     Allergies:    Allergies   Allergen Reactions    Bactrim Nausea And Vomiting       Current Meds:   Scheduled Meds:    linezolid  600 mg Oral 2 times per day

## 2024-07-24 NOTE — PROGRESS NOTES
[facility] - Progress Note    Today's Date and Time: 7/24/2024, 6:39 AM    Impression :   Cellulitis of left lower extremity  Presumptive Strep Group A  Severity of infection worsened by venous insufficiency and chronic edema  Concern for septicemia  Blood: No growth   KETTY  Improving  Type 2 diabetes mellitus  Venous stasis dermatitis with chronic edema    Recommendations:     Zyvox 600 mg po BID  D/C Vancomycin   D/C Cefepime   ASO titer elevated confirming Strep pyogenes infection  ACE wraps to left leg to decrease edema    Medical Decision Making/Summary/Discussion:7/24/2024       Infection Control Recommendations   Universal Precautions  Wound     Antimicrobial Stewardship Recommendations     Simplification of therapy  Targeted therapy  PK dosing    Coordination of Outpatient Care:   Estimated Length of IV antimicrobials:TBD  Patient will need Midline Catheter Insertion: TBD  Patient will need PICC line Insertion:TBD  Patient will need: Home IV , Infusion Center,  SNF,  LTAC: TBD  Patient will need outpatient wound care: yes    Chief complaint/reason for consultation:   Cellulitis left lower extremity  Concern for septicemia      History of Present Illness:   Viral Kang is a 53 y.o.-year-old  male who was initially admitted on 7/20/2024. Patient seen at the request of Dr. Lynch.    INITIAL HISTORY:    Patient presented to ED at [facility] on 07/20/24 with redness and swelling of his left lower leg.      Patient has a history of cellulitis of his left lower extremity in the past which required IV antibiotics and hospitalization.  Patient felt fatigued over the past day.  Pain and nausea described as being severe. Patient was transferred to [facility] 07/20/24 due to concerns of sepsis.     Patient's initial lactic acid at outlying facility 2.4, white blood cell count 17.4, blood and urine cultures drawn, chest x-ray shows

## 2024-07-25 ENCOUNTER — TELEPHONE (OUTPATIENT)
Dept: FAMILY MEDICINE CLINIC | Age: 54
End: 2024-07-25

## 2024-07-25 VITALS
OXYGEN SATURATION: 97 % | HEART RATE: 74 BPM | SYSTOLIC BLOOD PRESSURE: 115 MMHG | HEIGHT: 75 IN | BODY MASS INDEX: 39.17 KG/M2 | RESPIRATION RATE: 16 BRPM | DIASTOLIC BLOOD PRESSURE: 70 MMHG | WEIGHT: 315 LBS | TEMPERATURE: 97.9 F

## 2024-07-25 LAB
ANION GAP SERPL CALCULATED.3IONS-SCNC: 10 MMOL/L (ref 9–16)
BASOPHILS # BLD: 0.03 K/UL (ref 0–0.2)
BASOPHILS NFR BLD: 0 % (ref 0–2)
BUN SERPL-MCNC: 15 MG/DL (ref 6–20)
CALCIUM SERPL-MCNC: 8.5 MG/DL (ref 8.6–10.4)
CHLORIDE SERPL-SCNC: 98 MMOL/L (ref 98–107)
CO2 SERPL-SCNC: 22 MMOL/L (ref 20–31)
CREAT SERPL-MCNC: 0.9 MG/DL (ref 0.7–1.2)
EOSINOPHIL # BLD: 0.17 K/UL (ref 0–0.44)
EOSINOPHILS RELATIVE PERCENT: 2 % (ref 1–4)
ERYTHROCYTE [DISTWIDTH] IN BLOOD BY AUTOMATED COUNT: 13.5 % (ref 11.8–14.4)
GFR, ESTIMATED: >90 ML/MIN/1.73M2
GLUCOSE BLD-MCNC: 198 MG/DL (ref 75–110)
GLUCOSE BLD-MCNC: 211 MG/DL (ref 75–110)
GLUCOSE SERPL-MCNC: 194 MG/DL (ref 74–99)
HCT VFR BLD AUTO: 38 % (ref 40.7–50.3)
HGB BLD-MCNC: 12.5 G/DL (ref 13–17)
IMM GRANULOCYTES # BLD AUTO: 0.06 K/UL (ref 0–0.3)
IMM GRANULOCYTES NFR BLD: 1 %
LYMPHOCYTES NFR BLD: 1.4 K/UL (ref 1.1–3.7)
LYMPHOCYTES RELATIVE PERCENT: 19 % (ref 24–43)
MCH RBC QN AUTO: 29.7 PG (ref 25.2–33.5)
MCHC RBC AUTO-ENTMCNC: 32.9 G/DL (ref 28.4–34.8)
MCV RBC AUTO: 90.3 FL (ref 82.6–102.9)
MONOCYTES NFR BLD: 0.83 K/UL (ref 0.1–1.2)
MONOCYTES NFR BLD: 11 % (ref 3–12)
NEUTROPHILS NFR BLD: 67 % (ref 36–65)
NEUTS SEG NFR BLD: 5.06 K/UL (ref 1.5–8.1)
NRBC BLD-RTO: 0 PER 100 WBC
PLATELET # BLD AUTO: 133 K/UL (ref 138–453)
PMV BLD AUTO: 9.2 FL (ref 8.1–13.5)
POTASSIUM SERPL-SCNC: 3.6 MMOL/L (ref 3.7–5.3)
RBC # BLD AUTO: 4.21 M/UL (ref 4.21–5.77)
SODIUM SERPL-SCNC: 130 MMOL/L (ref 136–145)
WBC OTHER # BLD: 7.6 K/UL (ref 3.5–11.3)

## 2024-07-25 PROCEDURE — 2580000003 HC RX 258

## 2024-07-25 PROCEDURE — 85025 COMPLETE CBC W/AUTO DIFF WBC: CPT

## 2024-07-25 PROCEDURE — 6370000000 HC RX 637 (ALT 250 FOR IP)

## 2024-07-25 PROCEDURE — 6370000000 HC RX 637 (ALT 250 FOR IP): Performed by: INTERNAL MEDICINE

## 2024-07-25 PROCEDURE — 80048 BASIC METABOLIC PNL TOTAL CA: CPT

## 2024-07-25 PROCEDURE — 99239 HOSP IP/OBS DSCHRG MGMT >30: CPT | Performed by: STUDENT IN AN ORGANIZED HEALTH CARE EDUCATION/TRAINING PROGRAM

## 2024-07-25 PROCEDURE — 99232 SBSQ HOSP IP/OBS MODERATE 35: CPT | Performed by: INTERNAL MEDICINE

## 2024-07-25 PROCEDURE — 36415 COLL VENOUS BLD VENIPUNCTURE: CPT

## 2024-07-25 PROCEDURE — 6370000000 HC RX 637 (ALT 250 FOR IP): Performed by: STUDENT IN AN ORGANIZED HEALTH CARE EDUCATION/TRAINING PROGRAM

## 2024-07-25 PROCEDURE — 82947 ASSAY GLUCOSE BLOOD QUANT: CPT

## 2024-07-25 PROCEDURE — 6360000002 HC RX W HCPCS

## 2024-07-25 RX ORDER — CEPHALEXIN 500 MG/1
1000 CAPSULE ORAL EVERY 6 HOURS SCHEDULED
Status: DISCONTINUED | OUTPATIENT
Start: 2024-07-25 | End: 2024-07-25 | Stop reason: HOSPADM

## 2024-07-25 RX ORDER — CEPHALEXIN 500 MG/1
1000 CAPSULE ORAL 4 TIMES DAILY
Qty: 80 CAPSULE | Refills: 0 | Status: SHIPPED | OUTPATIENT
Start: 2024-07-25 | End: 2024-08-04

## 2024-07-25 RX ORDER — MINERAL OIL/HYDROPHIL PETROLAT
OINTMENT (GRAM) TOPICAL
Qty: 50 G | Refills: 0 | Status: SHIPPED | OUTPATIENT
Start: 2024-07-25

## 2024-07-25 RX ORDER — POTASSIUM CHLORIDE 20 MEQ/1
40 TABLET, EXTENDED RELEASE ORAL ONCE
Status: COMPLETED | OUTPATIENT
Start: 2024-07-25 | End: 2024-07-25

## 2024-07-25 RX ORDER — CEPHALEXIN 500 MG/1
1000 CAPSULE ORAL 4 TIMES DAILY
Qty: 80 CAPSULE | Refills: 0 | Status: SHIPPED | OUTPATIENT
Start: 2024-07-25 | End: 2024-07-25

## 2024-07-25 RX ADMIN — WHITE PETROLATUM: 1.75 OINTMENT TOPICAL at 09:31

## 2024-07-25 RX ADMIN — LINEZOLID 600 MG: 600 TABLET, FILM COATED ORAL at 09:30

## 2024-07-25 RX ADMIN — CEFEPIME 2000 MG: 2 INJECTION, POWDER, FOR SOLUTION INTRAVENOUS at 03:03

## 2024-07-25 RX ADMIN — HEPARIN SODIUM 5000 UNITS: 5000 INJECTION INTRAVENOUS; SUBCUTANEOUS at 07:22

## 2024-07-25 RX ADMIN — CEFEPIME 2000 MG: 2 INJECTION, POWDER, FOR SOLUTION INTRAVENOUS at 09:29

## 2024-07-25 RX ADMIN — INSULIN LISPRO 1 UNITS: 100 INJECTION, SOLUTION INTRAVENOUS; SUBCUTANEOUS at 09:11

## 2024-07-25 RX ADMIN — ATORVASTATIN CALCIUM 80 MG: 80 TABLET, FILM COATED ORAL at 09:30

## 2024-07-25 RX ADMIN — CEPHALEXIN 1000 MG: 500 CAPSULE ORAL at 11:37

## 2024-07-25 RX ADMIN — SODIUM CHLORIDE, PRESERVATIVE FREE 10 ML: 5 INJECTION INTRAVENOUS at 09:30

## 2024-07-25 RX ADMIN — POTASSIUM CHLORIDE 40 MEQ: 1500 TABLET, EXTENDED RELEASE ORAL at 13:04

## 2024-07-25 ASSESSMENT — PAIN - FUNCTIONAL ASSESSMENT: PAIN_FUNCTIONAL_ASSESSMENT: ACTIVITIES ARE NOT PREVENTED

## 2024-07-25 ASSESSMENT — PAIN DESCRIPTION - DESCRIPTORS: DESCRIPTORS: DISCOMFORT

## 2024-07-25 ASSESSMENT — PAIN DESCRIPTION - ORIENTATION: ORIENTATION: LEFT

## 2024-07-25 ASSESSMENT — PAIN SCALES - GENERAL: PAINLEVEL_OUTOF10: 4

## 2024-07-25 NOTE — DISCHARGE INSTRUCTIONS
Complete course of antibiotics as prescribed, follow up with PCP and infectious disease doctor as given  Keep a record of your BP at home, discuss with PCP regarding starting back on LISINOPRIL as appropriate.  Continue compression stockings/ACE wraps

## 2024-07-25 NOTE — PLAN OF CARE
Problem: Pain  Goal: Verbalizes/displays adequate comfort level or baseline comfort level  7/25/2024 1108 by Kezia Lantigua, RN  Outcome: Adequate for Discharge     Problem: Skin/Tissue Integrity  Goal: Absence of new skin breakdown  Description: 1.  Monitor for areas of redness and/or skin breakdown  2.  Assess vascular access sites hourly  3.  Every 4-6 hours minimum:  Change oxygen saturation probe site  4.  Every 4-6 hours:  If on nasal continuous positive airway pressure, respiratory therapy assess nares and determine need for appliance change or resting period.  7/25/2024 1108 by Kezia Lantigua, RN  Outcome: Adequate for Discharge     Problem: ABCDS Injury Assessment  Goal: Absence of physical injury  Outcome: Adequate for Discharge     Problem: Discharge Planning  Goal: Discharge to home or other facility with appropriate resources  Outcome: Adequate for Discharge     Problem: Chronic Conditions and Co-morbidities  Goal: Patient's chronic conditions and co-morbidity symptoms are monitored and maintained or improved  Outcome: Adequate for Discharge     Problem: Safety - Adult  Goal: Free from fall injury  Outcome: Adequate for Discharge     Problem: Respiratory - Adult  Goal: Achieves optimal ventilation and oxygenation  Outcome: Adequate for Discharge     Problem: Skin/Tissue Integrity - Adult  Goal: Skin integrity remains intact  Outcome: Adequate for Discharge  Goal: Incisions, wounds, or drain sites healing without S/S of infection  Outcome: Adequate for Discharge     Problem: Metabolic/Fluid and Electrolytes - Adult  Goal: Electrolytes maintained within normal limits  Outcome: Adequate for Discharge  Goal: Hemodynamic stability and optimal renal function maintained  Outcome: Adequate for Discharge  Goal: Glucose maintained within prescribed range  Outcome: Adequate for Discharge

## 2024-07-25 NOTE — PLAN OF CARE
Problem: Pain  Goal: Verbalizes/displays adequate comfort level or baseline comfort level  Outcome: Progressing     Problem: Skin/Tissue Integrity  Goal: Absence of new skin breakdown  Outcome: Progressing

## 2024-07-25 NOTE — DISCHARGE SUMMARY
NEGATIVE 07/21/2024 06:43 AM    PHUR 6.0 07/21/2024 06:43 AM    PHUR 6.0 05/11/2017 09:45 PM    PROTEINU NEGATIVE 07/21/2024 06:43 AM    GLUCOSEU NEGATIVE 07/21/2024 06:43 AM    GLUCOSEU NEGATIVE 04/21/2012 08:35 PM    KETUA NEGATIVE 07/21/2024 06:43 AM    BILIRUBINUR NEGATIVE 07/21/2024 06:43 AM    UROBILINOGEN Normal 07/21/2024 06:43 AM    NITRU NEGATIVE 07/21/2024 06:43 AM    LEUKOCYTESUR NEGATIVE 07/21/2024 06:43 AM     TSH:    Lab Results   Component Value Date/Time    TSH 0.52 07/21/2024 06:24 PM        Radiology:  Vascular duplex lower extremity venous bilateral    Result Date: 7/22/2024    No evidence of deep vein or superficial vein thrombosis in the bilateral lower extremities.     XR CHEST PORTABLE    Result Date: 7/20/2024  EXAM: XR CHEST PORTABLE HISTORY: . Shortness of breath with exertion . COMPARISON: 12/31/2023 TECHNIQUE: Single view of the chest. FINDINGS: This is an expiratory chest. Heart and vascularity are unremarkable. Lungs are free of focal infiltrates. Old granulomatous disease is noted.     Impression: 1. Expiratory chest. 2. No acute heart or lung disease identified.         Consultations:    Consults:     Final Specialist Recommendations/Findings:   IP CONSULT TO CRITICAL CARE  IP CONSULT TO SPIRITUAL SERVICES  IP CONSULT TO INFECTIOUS DISEASES      The patient was seen and examined on day of discharge and this discharge summary is in conjunction with any daily progress note from day of discharge.    Discharge plan:     Disposition: Home    Physician Follow Up:     Woody Fraga DNP  [address] [address] [phone]    Schedule an appointment as soon as possible for a visit in 1 week(s)      Woody Fraga DNP  [address] [address] [phone]          Omar Buchanan MD  [address]  [address]  [facility] [id number] [phone]    Schedule an appointment as soon as possible for a visit in 2 week(s)  Please call [phone]  discharge plan and follow up.    Electronically signed by   Lucinda Clement Sra, MD  7/25/2024  10:54 AM      Thank you Woody Wild DNP for the opportunity to be involved in this patient's care.

## 2024-07-25 NOTE — PROGRESS NOTES
[facility] - Progress Note    Today's Date and Time: 7/25/2024, 7:27 AM    Impression :   Cellulitis of left lower extremity  Presumptive Strep Group A  Severity of infection worsened by venous insufficiency and chronic edema  Concern for septicemia  Blood: No growth   KETTY  Improving  Type 2 diabetes mellitus  Venous stasis dermatitis with chronic edema    Recommendations:     D/C Zyvox 600 mg po BID  Switch to po Keflex 1000 mg po qid x 10 days  OK to continue treatment as outpatient  Continue compression stockings at home (he has a set at home) or ACE wraps  Office f/up in 2 weeks with Dr Buchanan for infection. Please call [phone] for appointment     ASO titer elevated confirming Strep pyogenes infection    Medical Decision Making/Summary/Discussion:7/25/2024       Infection Control Recommendations   Universal Precautions  Wound     Antimicrobial Stewardship Recommendations     Simplification of therapy  Targeted therapy  PK dosing    Coordination of Outpatient Care:   Estimated Length of IV antimicrobials:TBD  Patient will need Midline Catheter Insertion: TBD  Patient will need PICC line Insertion:TBD  Patient will need: Home IV , Infusion Center,  SNF,  LTAC: TBD  Patient will need outpatient wound care: yes    Chief complaint/reason for consultation:   Cellulitis left lower extremity  Concern for septicemia      History of Present Illness:   Viral Kang is a 53 y.o.-year-old  male who was initially admitted on 7/20/2024. Patient seen at the request of Dr. Lynch.    INITIAL HISTORY:    Patient presented to ED at [facility] on 07/20/24 with redness and swelling of his left lower leg.      Patient has a history of cellulitis of his left lower extremity in the past which required IV antibiotics and hospitalization.  Patient felt fatigued over the past day.  Pain and nausea described as being severe. Patient was transferred to [facility]  Nasal NEGATIVE     Comment: NEGATIVE:  MRSA DNA not detected by nucleic acid amplification.                                                    Results should be used as an adjunct to nosocomial control efforts to identify patients   needing enhanced precautions.    The test is not intended to identify patients with staphylococcal infections.  Results   should not be used to guide or monitor treatment for MRSA infections.         Culture, Blood 2 [[id number]] Collected: 07/20/24 1427    Order Status: Completed Specimen: Blood Updated: 07/25/24 0607     Specimen Description .BLOOD     Special Requests Site: Blood     Culture NO GROWTH 5 DAYS    Culture, Blood 1 [[id number]] Collected: 07/20/24 1426    Order Status: Completed Specimen: Blood Updated: 07/25/24 0607     Specimen Description .BLOOD     Special Requests          Culture NO GROWTH 5 DAYS              Medical Decision Making-Other:     Note:    Thank you for allowing us to participate in the care of this patient. Please call with questions.    MD Elisa Bhatti DPM participated in the evaluation of the patient under supervision.       ATTESTATION:     I have discussed the case, including pertinent history and exam findings with the medical resident. I have evaluated the  History, physical findings and pictures of the patient and the key elements of the encounter have been performed by me. I have reviewed the laboratory data, other diagnostic studies and discussed them with the medical resident. I have updated the medical record where necessary.     I agree with the assessment, plan and orders as documented by the medical resident and I have modified them as necessary.     Omar Buchanan MD    7/25/2024

## 2024-07-25 NOTE — PROGRESS NOTES
Writer gave discharge instructions to the patient, all questions were answered. Left the hospital in good condition and not in any form of distress.

## 2024-07-25 NOTE — DISCHARGE INSTR - DIET

## 2024-07-25 NOTE — DISCHARGE INSTR - PHARMACY
cephALEXin 500 MG capsule  Commonly known as: KEFLEX  Take 2 capsules by mouth 4 times daily for 40 doses

## 2024-07-25 NOTE — TELEPHONE ENCOUNTER
Viral is being discharged from [facility]. Admitted for cellulitis. Follow up scheduled for 8/1/24.      Health Maintenance   Topic Date Due    Hepatitis B vaccine (1 of 3 - 3-dose series) Never done    Pneumococcal 0-64 years Vaccine (1 of 2 - PCV) Never done    HIV screen  Never done    Hepatitis C screen  Never done    Shingles vaccine (1 of 2) Never done    COVID-19 Vaccine (4 - 2023-24 season) 09/01/2023    Lipids  11/08/2023    Diabetic Alb to Cr ratio (uACR) test  05/09/2024    Flu vaccine (1) 08/01/2024    Diabetic foot exam  11/07/2024    Depression Screen  01/11/2025    Diabetic retinal exam  04/04/2025    A1C test (Diabetic or Prediabetic)  07/20/2025    GFR test (Diabetes, CKD 3-4, OR last GFR 15-59)  07/25/2025    Colorectal Cancer Screen  01/24/2032    DTaP/Tdap/Td vaccine (3 - Td or Tdap) 10/20/2032    Hepatitis A vaccine  Aged Out    Hib vaccine  Aged Out    Polio vaccine  Aged Out    Meningococcal (ACWY) vaccine  Aged Out             (applicable per patient's age: Cancer Screenings, Depression Screening, Fall Risk Screening, Immunizations)    Hemoglobin A1C (%)   Date Value   07/20/2024 6.8 (H)   05/07/2024 7.5   11/07/2023 6.6     AST (U/L)   Date Value   07/20/2024 37     ALT (U/L)   Date Value   07/20/2024 38     BUN (mg/dL)   Date Value   07/25/2024 15      (goal A1C is < 7)   (goal LDL is <100) need 30-50% reduction from baseline     BP Readings from Last 3 Encounters:   07/25/24 115/70   07/20/24 113/61   05/07/24 126/84    (goal /80)      All Future Testing planned in [facility]:  Lab Frequency Next Occurrence   HYPOGLYCEMIA TREATMENT: blood glucose LESS THAN 70 mg/dL and patient ALERT and TOLERATING PO PRN    HYPOGLYCEMIA TREATMENT: blood glucose LESS THAN 70 mg/dL and patient NOT ALERT or NPO PRN    POCT Glucose PRN    Intake and output EVERY 8 HOURS    Initiate Oxygen Therapy Protocol PRN    Home BIPAP or CPAP AT BEDTIME (RT)    Basic Metabolic Panel w/ Reflex to MG Daily (Lab)    CBC

## 2024-07-25 NOTE — CARE COORDINATION
Discharge Report    [facility]  Clinical Case Management Department  Written by: Kristy Berumen RN    Patient Name: Viral Kang  Attending Provider: No att. providers found  Admit Date: 2024 11:14 PM  MRN: [id number]  Account: [id number]                     : 1970  Discharge Date: 2024      Disposition: home    Kristy Berumen RN

## 2024-07-26 LAB
MICROORGANISM SPEC CULT: NORMAL
SERVICE CMNT-IMP: NORMAL
SPECIMEN DESCRIPTION: NORMAL

## 2024-07-29 LAB — INTERVENTION: NORMAL

## 2024-08-01 ENCOUNTER — OFFICE VISIT (OUTPATIENT)
Dept: FAMILY MEDICINE CLINIC | Age: 54
End: 2024-08-01

## 2024-08-01 VITALS
BODY MASS INDEX: 39.17 KG/M2 | HEIGHT: 75 IN | WEIGHT: 315 LBS | OXYGEN SATURATION: 95 % | DIASTOLIC BLOOD PRESSURE: 88 MMHG | HEART RATE: 71 BPM | SYSTOLIC BLOOD PRESSURE: 126 MMHG

## 2024-08-01 DIAGNOSIS — Z09 HOSPITAL DISCHARGE FOLLOW-UP: ICD-10-CM

## 2024-08-01 DIAGNOSIS — L03.116 CELLULITIS OF LEFT LOWER LEG: Primary | ICD-10-CM

## 2024-08-01 DIAGNOSIS — A41.9 SEPTIC SHOCK (HCC): ICD-10-CM

## 2024-08-01 DIAGNOSIS — I10 ESSENTIAL HYPERTENSION: ICD-10-CM

## 2024-08-01 DIAGNOSIS — R65.21 SEPTIC SHOCK (HCC): ICD-10-CM

## 2024-08-01 SDOH — ECONOMIC STABILITY: INCOME INSECURITY: HOW HARD IS IT FOR YOU TO PAY FOR THE VERY BASICS LIKE FOOD, HOUSING, MEDICAL CARE, AND HEATING?: NOT HARD AT ALL

## 2024-08-01 SDOH — ECONOMIC STABILITY: FOOD INSECURITY: WITHIN THE PAST 12 MONTHS, YOU WORRIED THAT YOUR FOOD WOULD RUN OUT BEFORE YOU GOT MONEY TO BUY MORE.: NEVER TRUE

## 2024-08-01 SDOH — ECONOMIC STABILITY: FOOD INSECURITY: WITHIN THE PAST 12 MONTHS, THE FOOD YOU BOUGHT JUST DIDN'T LAST AND YOU DIDN'T HAVE MONEY TO GET MORE.: NEVER TRUE

## 2024-08-01 NOTE — PROGRESS NOTES
before.  He does not feel that the previous episode got completely better.  He works as a  but states that he takes frequent breaks and does not travel on long distances.     Today pt feels improved. Energy level is better. Denies pain to left lower leg. Still on Keflex. Still wrapping with ACE.     Inpatient course: Discharge summary reviewed- see chart.    Interval history/Current status:     Patient Active Problem List   Diagnosis    Recurrent cellulitis of lower leg    Essential hypertension    Cervical disc herniation    Radiculopathy    Type 2 diabetes mellitus with hyperglycemia, without long-term current use of insulin (HCC)    Family history of colon cancer in father    Mixed hyperlipidemia    Cellulitis of left lower leg    Septic shock (HCC)    Cellulitis of lower extremity       Medications listed as ordered at the time of discharge from hospital     Medication List            Accurate as of August 1, 2024  8:38 AM. If you have any questions, ask your nurse or doctor.                CONTINUE taking these medications      atorvastatin 80 MG tablet  Commonly known as: LIPITOR  Take 1 tablet by mouth daily     blood glucose monitor kit and supplies  Please dispense glucometer per patient's insurance.     blood glucose test strips  Please check blood glucose daily, dispense per patient's insurance     cephALEXin 500 MG capsule  Commonly known as: KEFLEX  Take 2 capsules by mouth 4 times daily for 40 doses     CPAP Machine Misc     Lancets Misc  1 each by Does not apply route 2 times daily Please dispense per patient's insurance     metFORMIN 500 MG extended release tablet  Commonly known as: GLUCOPHAGE-XR  Take 1 tablet by mouth daily (with breakfast)     mineral oil-hydrophilic petrolatum ointment  Apply topically as needed.                Medications marked \"taking\" at this time  Outpatient Medications Marked as Taking for the 8/1/24 encounter (Office Visit) with Woody Fraga DNP

## 2024-08-12 NOTE — PROGRESS NOTES
[facility] - Office Progress Note    Today's Date and Time: 08/13/2024    Impression :   Cellulitis of left lower extremity  Presumptive Strep Group A  Severity of infection worsened by venous insufficiency and chronic edema  Concern for septicemia  Blood: No growth   KETTY  Improving  Type 2 diabetes mellitus  Venous stasis dermatitis with chronic edema    Recommendations:     Completed po Keflex 1000 mg po qid x 10 days.  No additional antibiotics needed at this point    Continue compression stockings at home (he has a set at home)   Office f/up PRN    ASO titer elevated confirming prior Strep pyogenes infection    Medical Decision Making/Summary/Discussion:7/25/2024       Infection Control Recommendations   Universal Precautions  Wound     Antimicrobial Stewardship Recommendations     Simplification of therapy  Targeted therapy  PK dosing    Coordination of Outpatient Care:   Estimated Length of IV antimicrobials:TBD  Patient will need Midline Catheter Insertion: TBD  Patient will need PICC line Insertion:TBD  Patient will need: Home IV , Infusion Center,  SNF,  LTAC: TBD  Patient will need outpatient wound care: yes    Chief complaint/reason for consultation:   Cellulitis left lower extremity  Concern for septicemia      History of Present Illness:   Viral Kang is a 53 y.o.-year-old  male who was seen in the office on 8/13/2024 . Patient seen at the request of Dr. Fraga    INITIAL HISTORY:    Patient known to our service from prior admission to [facility] on 7-20-24.    Patient presented to ED at [facility] on 07/20/24 with redness and swelling of his left lower leg.      Patient has a history of cellulitis of his left lower extremity in the past which required IV antibiotics and hospitalization.  Patient felt fatigued over the past day.  Pain and nausea described as being severe. Patient was transferred to [facility]

## 2024-08-13 ENCOUNTER — OFFICE VISIT (OUTPATIENT)
Dept: FAMILY MEDICINE CLINIC | Age: 54
End: 2024-08-13
Payer: COMMERCIAL

## 2024-08-13 ENCOUNTER — OFFICE VISIT (OUTPATIENT)
Dept: INFECTIOUS DISEASES | Age: 54
End: 2024-08-13
Payer: COMMERCIAL

## 2024-08-13 VITALS
HEIGHT: 75 IN | SYSTOLIC BLOOD PRESSURE: 118 MMHG | OXYGEN SATURATION: 95 % | BODY MASS INDEX: 39.17 KG/M2 | HEART RATE: 67 BPM | DIASTOLIC BLOOD PRESSURE: 78 MMHG | WEIGHT: 315 LBS

## 2024-08-13 VITALS
BODY MASS INDEX: 39.17 KG/M2 | RESPIRATION RATE: 20 BRPM | SYSTOLIC BLOOD PRESSURE: 136 MMHG | OXYGEN SATURATION: 95 % | HEIGHT: 75 IN | TEMPERATURE: 97.5 F | WEIGHT: 315 LBS | HEART RATE: 72 BPM | DIASTOLIC BLOOD PRESSURE: 84 MMHG

## 2024-08-13 DIAGNOSIS — I10 ESSENTIAL HYPERTENSION: ICD-10-CM

## 2024-08-13 DIAGNOSIS — L03.116 CELLULITIS OF LEFT LOWER LEG: ICD-10-CM

## 2024-08-13 DIAGNOSIS — E11.65 TYPE 2 DIABETES MELLITUS WITH HYPERGLYCEMIA, WITHOUT LONG-TERM CURRENT USE OF INSULIN (HCC): ICD-10-CM

## 2024-08-13 DIAGNOSIS — I87.2 VENOUS STASIS DERMATITIS OF BOTH LOWER EXTREMITIES: ICD-10-CM

## 2024-08-13 DIAGNOSIS — E11.9 TYPE 2 DIABETES MELLITUS WITHOUT COMPLICATION, WITHOUT LONG-TERM CURRENT USE OF INSULIN (HCC): Primary | ICD-10-CM

## 2024-08-13 DIAGNOSIS — L03.116 CELLULITIS OF LEFT LOWER LEG: Primary | ICD-10-CM

## 2024-08-13 LAB — HBA1C MFR BLD: 6.9 %

## 2024-08-13 PROCEDURE — 1111F DSCHRG MED/CURRENT MED MERGE: CPT | Performed by: INTERNAL MEDICINE

## 2024-08-13 PROCEDURE — 1036F TOBACCO NON-USER: CPT | Performed by: INTERNAL MEDICINE

## 2024-08-13 PROCEDURE — 1036F TOBACCO NON-USER: CPT | Performed by: NURSE PRACTITIONER

## 2024-08-13 PROCEDURE — 2022F DILAT RTA XM EVC RTNOPTHY: CPT | Performed by: INTERNAL MEDICINE

## 2024-08-13 PROCEDURE — 3017F COLORECTAL CA SCREEN DOC REV: CPT | Performed by: NURSE PRACTITIONER

## 2024-08-13 PROCEDURE — G8417 CALC BMI ABV UP PARAM F/U: HCPCS | Performed by: INTERNAL MEDICINE

## 2024-08-13 PROCEDURE — 1111F DSCHRG MED/CURRENT MED MERGE: CPT | Performed by: NURSE PRACTITIONER

## 2024-08-13 PROCEDURE — 3074F SYST BP LT 130 MM HG: CPT | Performed by: NURSE PRACTITIONER

## 2024-08-13 PROCEDURE — 3079F DIAST BP 80-89 MM HG: CPT | Performed by: INTERNAL MEDICINE

## 2024-08-13 PROCEDURE — 99214 OFFICE O/P EST MOD 30 MIN: CPT | Performed by: NURSE PRACTITIONER

## 2024-08-13 PROCEDURE — 83036 HEMOGLOBIN GLYCOSYLATED A1C: CPT | Performed by: NURSE PRACTITIONER

## 2024-08-13 PROCEDURE — 3044F HG A1C LEVEL LT 7.0%: CPT | Performed by: NURSE PRACTITIONER

## 2024-08-13 PROCEDURE — G8417 CALC BMI ABV UP PARAM F/U: HCPCS | Performed by: NURSE PRACTITIONER

## 2024-08-13 PROCEDURE — 3078F DIAST BP <80 MM HG: CPT | Performed by: NURSE PRACTITIONER

## 2024-08-13 PROCEDURE — G8427 DOCREV CUR MEDS BY ELIG CLIN: HCPCS | Performed by: INTERNAL MEDICINE

## 2024-08-13 PROCEDURE — 99213 OFFICE O/P EST LOW 20 MIN: CPT | Performed by: INTERNAL MEDICINE

## 2024-08-13 PROCEDURE — 3044F HG A1C LEVEL LT 7.0%: CPT | Performed by: INTERNAL MEDICINE

## 2024-08-13 PROCEDURE — 3017F COLORECTAL CA SCREEN DOC REV: CPT | Performed by: INTERNAL MEDICINE

## 2024-08-13 PROCEDURE — 2022F DILAT RTA XM EVC RTNOPTHY: CPT | Performed by: NURSE PRACTITIONER

## 2024-08-13 PROCEDURE — G8427 DOCREV CUR MEDS BY ELIG CLIN: HCPCS | Performed by: NURSE PRACTITIONER

## 2024-08-13 PROCEDURE — 3075F SYST BP GE 130 - 139MM HG: CPT | Performed by: INTERNAL MEDICINE

## 2024-08-13 RX ORDER — METFORMIN HYDROCHLORIDE 500 MG/1
1000 TABLET, EXTENDED RELEASE ORAL
Qty: 60 TABLET | Refills: 2 | Status: SHIPPED | OUTPATIENT
Start: 2024-08-13

## 2024-08-13 ASSESSMENT — ENCOUNTER SYMPTOMS
SHORTNESS OF BREATH: 0
VISUAL CHANGE: 0
NAUSEA: 0
VOMITING: 0
COUGH: 0
DIARRHEA: 0

## 2024-08-13 NOTE — PROGRESS NOTES
at this time.  Will continue to monitor.       3. Cellulitis of left lower leg   -- Much improved.  Continue wearing compression stockings.                       Completed Refills   Requested Prescriptions     Signed Prescriptions Disp Refills    metFORMIN (GLUCOPHAGE-XR) 500 MG extended release tablet 60 tablet 2     Sig: Take 2 tablets by mouth daily (with breakfast)     Return in about 3 months (around 11/13/2024) for DM.  Orders Placed This Encounter   Medications    metFORMIN (GLUCOPHAGE-XR) 500 MG extended release tablet     Sig: Take 2 tablets by mouth daily (with breakfast)     Dispense:  60 tablet     Refill:  2     Orders Placed This Encounter   Procedures    POCT glycosylated hemoglobin (Hb A1C)         Patient Instructions     SURVEY:    You may be receiving a survey from [employer] regarding your visit today.    Please complete the survey to enable us to provide the highest quality of care to you and your family.    If you cannot score us a very good on any question, please call the office to discuss how we could have made your experience a very good one.    Thank you.     Electronically signed by Woody Fraga DNP,APRN,CNP  on 8/13/2024 at 9:03 AM           Completed Refills   Requested Prescriptions     Signed Prescriptions Disp Refills    metFORMIN (GLUCOPHAGE-XR) 500 MG extended release tablet 60 tablet 2     Sig: Take 2 tablets by mouth daily (with breakfast)

## 2024-10-14 RX ORDER — ATORVASTATIN CALCIUM 80 MG/1
80 TABLET, FILM COATED ORAL DAILY
Qty: 90 TABLET | Refills: 1 | Status: SHIPPED | OUTPATIENT
Start: 2024-10-14

## 2024-10-14 NOTE — TELEPHONE ENCOUNTER
Rx refill request via [facility]  Atorvastatin 80mg qd  Last OV for hyperlipidemia 5-7-24  Next appt- 11/19/24

## 2024-10-16 NOTE — PROGRESS NOTES
"Leif Huitron is a 54 y.o. male {with Hx of (Optional):38987} to establish care and is also due for annual.  {Ask PMH/FHx/relevant Hx r/o if relevant to acute complaint:55192}  {Exam Remarks:34554}  {vaccine/labs/caregaps/screenings/imagiing/encounters:94901::\"reviewed at this visit\"}  Follow up {ATEND:33175}    " [facility]   Pharmacy Pharmacokinetic Monitoring Service - Vancomycin     Derek Kang is a 53 y.o. male starting on vancomycin therapy for SSTI. Pharmacy consulted by Clifford Gomez for monitoring and adjustment.    Target Concentration: Goal AUC/CANDY 400-600 mg*hr/L    Additional Antimicrobials: ceftriaxone    Pertinent Laboratory Values:   Wt Readings from Last 1 Encounters:   01/01/24 (!) 168.3 kg (371 lb 1.6 oz)     Temp Readings from Last 1 Encounters:   01/01/24 99.5 °F (37.5 °C) (Oral)     Estimated Creatinine Clearance: 120 mL/min (based on SCr of 1.2 mg/dL).  Recent Labs     12/31/23  1211 01/01/24  1429   CREATININE 1.1 1.2   BUN 17 21*   WBC 13.2* 16.7*     Procalcitonin: NA    Pertinent Cultures:  Culture Date Source Results   1/1/2024 Blood x 1 sent   MRSA Nasal Swab: N/A. Non-respiratory infection.    Plan:  Dosing recommendations based on Bayesian software  Start vancomycin 2000 mg once then 1250 mg q12h  Anticipated AUC of 557 and trough concentration of 15.6 at steady state  Renal labs as indicated   Vancomycin concentration ordered for TBD @ TBD   Pharmacy will continue to monitor patient and adjust therapy as indicated    Thank you for the consult,  Horacio Lee RPH  1/1/2024 7:26 PM

## 2024-11-04 RX ORDER — METFORMIN HYDROCHLORIDE 500 MG/1
1000 TABLET, EXTENDED RELEASE ORAL
Qty: 60 TABLET | Refills: 2 | Status: SHIPPED | OUTPATIENT
Start: 2024-11-04

## 2024-11-13 RX ORDER — METFORMIN HYDROCHLORIDE 500 MG/1
1000 TABLET, EXTENDED RELEASE ORAL
Qty: 60 TABLET | Refills: 2 | OUTPATIENT
Start: 2024-11-13

## 2024-11-19 ENCOUNTER — OFFICE VISIT (OUTPATIENT)
Dept: FAMILY MEDICINE CLINIC | Age: 54
End: 2024-11-19
Payer: COMMERCIAL

## 2024-11-19 VITALS
WEIGHT: 315 LBS | SYSTOLIC BLOOD PRESSURE: 128 MMHG | HEART RATE: 66 BPM | BODY MASS INDEX: 39.17 KG/M2 | HEIGHT: 75 IN | OXYGEN SATURATION: 95 % | DIASTOLIC BLOOD PRESSURE: 88 MMHG

## 2024-11-19 DIAGNOSIS — E11.9 TYPE 2 DIABETES MELLITUS WITHOUT COMPLICATION, WITHOUT LONG-TERM CURRENT USE OF INSULIN (HCC): Primary | ICD-10-CM

## 2024-11-19 LAB — HBA1C MFR BLD: 6.7 %

## 2024-11-19 PROCEDURE — 3079F DIAST BP 80-89 MM HG: CPT | Performed by: NURSE PRACTITIONER

## 2024-11-19 PROCEDURE — 83036 HEMOGLOBIN GLYCOSYLATED A1C: CPT | Performed by: NURSE PRACTITIONER

## 2024-11-19 PROCEDURE — 3074F SYST BP LT 130 MM HG: CPT | Performed by: NURSE PRACTITIONER

## 2024-11-19 PROCEDURE — 3017F COLORECTAL CA SCREEN DOC REV: CPT | Performed by: NURSE PRACTITIONER

## 2024-11-19 PROCEDURE — 3044F HG A1C LEVEL LT 7.0%: CPT | Performed by: NURSE PRACTITIONER

## 2024-11-19 PROCEDURE — 1036F TOBACCO NON-USER: CPT | Performed by: NURSE PRACTITIONER

## 2024-11-19 PROCEDURE — G8417 CALC BMI ABV UP PARAM F/U: HCPCS | Performed by: NURSE PRACTITIONER

## 2024-11-19 PROCEDURE — 2022F DILAT RTA XM EVC RTNOPTHY: CPT | Performed by: NURSE PRACTITIONER

## 2024-11-19 PROCEDURE — G8427 DOCREV CUR MEDS BY ELIG CLIN: HCPCS | Performed by: NURSE PRACTITIONER

## 2024-11-19 PROCEDURE — G8484 FLU IMMUNIZE NO ADMIN: HCPCS | Performed by: NURSE PRACTITIONER

## 2024-11-19 PROCEDURE — 99213 OFFICE O/P EST LOW 20 MIN: CPT | Performed by: NURSE PRACTITIONER

## 2024-11-19 ASSESSMENT — ENCOUNTER SYMPTOMS
COUGH: 0
DIARRHEA: 0
NAUSEA: 0
VOMITING: 0
SHORTNESS OF BREATH: 0

## 2024-11-19 NOTE — PROGRESS NOTES
HPI Notes    Name: Viral Kang  : 1970         Chief Complaint:     Chief Complaint   Patient presents with    Diabetes     3m f/u  Last A1c was 6.9 on 24.  Metformin 500mg 2 tabs qd.       History of Present Illness:        Diabetes  He presents for his follow-up diabetic visit. He has type 2 diabetes mellitus. His disease course has been stable. There are no hypoglycemic associated symptoms. Pertinent negatives for hypoglycemia include no dizziness, headaches or seizures. Pertinent negatives for diabetes include no chest pain. There are no hypoglycemic complications. Symptoms are stable. Risk factors for coronary artery disease include diabetes mellitus, male sex and obesity. Current diabetic treatment includes diet and oral agent (monotherapy). He is compliant with treatment all of the time. He is following a generally healthy diet. His breakfast blood glucose is taken between 6-7 am. His breakfast blood glucose range is generally 130-140 mg/dl. An ACE inhibitor/angiotensin II receptor blocker is not being taken.       Past Medical History:     Past Medical History:   Diagnosis Date    Arthritis     Hypertension     on meds x 2yrs    Mixed hyperlipidemia 2023    COLLETTE (obstructive sleep apnea)     On CPAP    Type 2 diabetes mellitus without complication, without long-term current use of insulin (HCC) 10/26/2021    Unspecified sleep apnea     CPAP nightly use      Reviewed all health maintenance requirements and ordered appropriate tests  Health Maintenance Due   Topic Date Due    Pneumococcal 0-64 years Vaccine (1 of 2 - PCV) Never done    HIV screen  Never done    Hepatitis C screen  Never done    Hepatitis B vaccine (1 of 3 - 19+ 3-dose series) Never done    Shingles vaccine (1 of 2) Never done    Lipids  2023    Diabetic Alb to Cr ratio (uACR) test  2024    Flu vaccine (1) 2024    COVID-19 Vaccine ( season) 2024    Diabetic foot exam  2024

## 2025-02-12 NOTE — TELEPHONE ENCOUNTER
Last OV: 11/19/2024  DM   Last RX:    Next scheduled apt: 2/25/2025   3 months DM           Pt requesting a refill   Medication pending for approval

## 2025-02-13 RX ORDER — METFORMIN HYDROCHLORIDE 500 MG/1
1000 TABLET, EXTENDED RELEASE ORAL
Qty: 60 TABLET | Refills: 2 | Status: SHIPPED | OUTPATIENT
Start: 2025-02-13

## 2025-02-25 ENCOUNTER — OFFICE VISIT (OUTPATIENT)
Dept: FAMILY MEDICINE CLINIC | Age: 55
End: 2025-02-25

## 2025-02-25 ENCOUNTER — HOSPITAL ENCOUNTER (OUTPATIENT)
Age: 55
Setting detail: SPECIMEN
Discharge: HOME | End: 2025-02-25
Payer: COMMERCIAL

## 2025-02-25 VITALS
WEIGHT: 315 LBS | OXYGEN SATURATION: 95 % | DIASTOLIC BLOOD PRESSURE: 78 MMHG | HEART RATE: 75 BPM | BODY MASS INDEX: 39.17 KG/M2 | HEIGHT: 75 IN | SYSTOLIC BLOOD PRESSURE: 128 MMHG

## 2025-02-25 DIAGNOSIS — E11.9 TYPE 2 DIABETES MELLITUS WITHOUT COMPLICATION, WITHOUT LONG-TERM CURRENT USE OF INSULIN (HCC): ICD-10-CM

## 2025-02-25 DIAGNOSIS — G47.33 OSA (OBSTRUCTIVE SLEEP APNEA): ICD-10-CM

## 2025-02-25 DIAGNOSIS — E11.9 TYPE 2 DIABETES MELLITUS WITHOUT COMPLICATION, WITHOUT LONG-TERM CURRENT USE OF INSULIN (HCC): Primary | ICD-10-CM

## 2025-02-25 LAB
CREAT UR-MCNC: 106 MG/DL (ref 39–259)
HBA1C MFR BLD: 6.5 %
MICROALBUMIN UR-MCNC: 16 MG/L (ref 0–20)
MICROALBUMIN/CREAT UR-RTO: 15 MCG/MG CREAT (ref 0–17)

## 2025-02-25 PROCEDURE — 82570 ASSAY OF URINE CREATININE: CPT

## 2025-02-25 PROCEDURE — 82043 UR ALBUMIN QUANTITATIVE: CPT

## 2025-02-25 SDOH — ECONOMIC STABILITY: FOOD INSECURITY: WITHIN THE PAST 12 MONTHS, THE FOOD YOU BOUGHT JUST DIDN'T LAST AND YOU DIDN'T HAVE MONEY TO GET MORE.: NEVER TRUE

## 2025-02-25 SDOH — ECONOMIC STABILITY: FOOD INSECURITY: WITHIN THE PAST 12 MONTHS, YOU WORRIED THAT YOUR FOOD WOULD RUN OUT BEFORE YOU GOT MONEY TO BUY MORE.: NEVER TRUE

## 2025-02-25 ASSESSMENT — ENCOUNTER SYMPTOMS
SHORTNESS OF BREATH: 0
NAUSEA: 0
VOMITING: 0
DIARRHEA: 0
COUGH: 0

## 2025-02-25 ASSESSMENT — PATIENT HEALTH QUESTIONNAIRE - PHQ9
SUM OF ALL RESPONSES TO PHQ QUESTIONS 1-9: 0
SUM OF ALL RESPONSES TO PHQ QUESTIONS 1-9: 0
2. FEELING DOWN, DEPRESSED OR HOPELESS: NOT AT ALL
SUM OF ALL RESPONSES TO PHQ QUESTIONS 1-9: 0
1. LITTLE INTEREST OR PLEASURE IN DOING THINGS: NOT AT ALL
SUM OF ALL RESPONSES TO PHQ9 QUESTIONS 1 & 2: 0
SUM OF ALL RESPONSES TO PHQ QUESTIONS 1-9: 0

## 2025-02-25 NOTE — PROGRESS NOTES
HPI Notes    Name: Viral Kang  : 1970         Chief Complaint:     Chief Complaint   Patient presents with    Diabetes     3m f/u  Last A1c was 6.7  Metformin 500mg 2 tabs qd      Sleep Apnea     Pt requesting order for new equipment for mask, hose, filters.       History of Present Illness:        Diabetes  He presents for his follow-up diabetic visit. He has type 2 diabetes mellitus. His disease course has been stable. There are no hypoglycemic associated symptoms. Pertinent negatives for hypoglycemia include no dizziness, headaches or seizures. Pertinent negatives for diabetes include no chest pain. Symptoms are stable. Risk factors for coronary artery disease include diabetes mellitus. Current diabetic treatment includes diet and oral agent (monotherapy). He is compliant with treatment all of the time. He is following a generally healthy diet. He never participates in exercise. An ACE inhibitor/angiotensin II receptor blocker is not being taken.     Pt has been compliant with wearing CPAP. Pt is seeing benefit from wearing CPAP regularly. Pt requesting new order for CPAP supplies.     Past Medical History:     Past Medical History:   Diagnosis Date    Arthritis     Hypertension     on meds x 2yrs    Mixed hyperlipidemia 2023    COLLETTE (obstructive sleep apnea)     On CPAP    Type 2 diabetes mellitus without complication, without long-term current use of insulin (HCC) 10/26/2021    Unspecified sleep apnea     CPAP nightly use      Reviewed all health maintenance requirements and ordered appropriate tests  Health Maintenance Due   Topic Date Due    HIV screen  Never done    Hepatitis C screen  Never done    Hepatitis B vaccine (1 of 3 - 19+ 3-dose series) Never done    Pneumococcal 50+ years Vaccine (1 of 2 - PCV) Never done    Shingles vaccine (1 of 2) Never done    Lipids  2023    Diabetic Alb to Cr ratio (uACR) test  2024    Flu vaccine (1) 2024    COVID-19 Vaccine (4 -

## 2025-04-11 RX ORDER — ATORVASTATIN CALCIUM 80 MG/1
80 TABLET, FILM COATED ORAL DAILY
Qty: 90 TABLET | Refills: 1 | Status: SHIPPED | OUTPATIENT
Start: 2025-04-11

## 2025-05-19 DIAGNOSIS — E11.9 TYPE 2 DIABETES MELLITUS WITHOUT COMPLICATION, WITHOUT LONG-TERM CURRENT USE OF INSULIN (HCC): Primary | ICD-10-CM

## 2025-05-20 RX ORDER — METFORMIN HYDROCHLORIDE 500 MG/1
1000 TABLET, EXTENDED RELEASE ORAL
Qty: 60 TABLET | Refills: 2 | Status: SHIPPED | OUTPATIENT
Start: 2025-05-20

## 2025-05-20 NOTE — TELEPHONE ENCOUNTER
Last visit:  2/25/2025  Next Visit Date:    Future Appointments   Date Time Provider Department Center   6/3/2025  6:00 AM Woody Fraga DNP [facility] DEP         Medication List:  Prior to Admission medications    Medication Sig Start Date End Date Taking? Authorizing Provider   atorvastatin (LIPITOR) 80 MG tablet Take 1 tablet by mouth daily 4/11/25   Woody Fraga DNP   metFORMIN (GLUCOPHAGE-XR) 500 MG extended release tablet Take 2 tablets by mouth daily (with breakfast) 2/13/25   Woody Fraga DNP   blood glucose monitor kit and supplies Please dispense glucometer per patient's insurance. 2/7/22   Woody Fraga DNP   Lancets MISC 1 each by Does not apply route 2 times daily Please dispense per patient's insurance 2/7/22   Woody Fraga DNP   blood glucose monitor strips Please check blood glucose daily, dispense per patient's insurance 2/7/22   Woody Fraga DNP             Patient is requesting refill of metformin xr 500mg to [facility]. Medication is pending approval.

## 2025-06-03 ENCOUNTER — RESULTS FOLLOW-UP (OUTPATIENT)
Dept: FAMILY MEDICINE CLINIC | Age: 55
End: 2025-06-03

## 2025-06-03 ENCOUNTER — OFFICE VISIT (OUTPATIENT)
Dept: FAMILY MEDICINE CLINIC | Age: 55
End: 2025-06-03
Payer: COMMERCIAL

## 2025-06-03 ENCOUNTER — HOSPITAL ENCOUNTER (OUTPATIENT)
Age: 55
Discharge: HOME | End: 2025-06-03
Payer: COMMERCIAL

## 2025-06-03 VITALS
DIASTOLIC BLOOD PRESSURE: 82 MMHG | WEIGHT: 315 LBS | SYSTOLIC BLOOD PRESSURE: 138 MMHG | BODY MASS INDEX: 39.17 KG/M2 | HEART RATE: 69 BPM | HEIGHT: 75 IN | OXYGEN SATURATION: 100 %

## 2025-06-03 DIAGNOSIS — E78.2 MIXED HYPERLIPIDEMIA: ICD-10-CM

## 2025-06-03 DIAGNOSIS — E11.9 TYPE 2 DIABETES MELLITUS WITHOUT COMPLICATION, WITHOUT LONG-TERM CURRENT USE OF INSULIN (HCC): ICD-10-CM

## 2025-06-03 DIAGNOSIS — E11.9 TYPE 2 DIABETES MELLITUS WITHOUT COMPLICATION, WITHOUT LONG-TERM CURRENT USE OF INSULIN (HCC): Primary | ICD-10-CM

## 2025-06-03 LAB
ALBUMIN SERPL-MCNC: 4.5 G/DL (ref 3.5–5.2)
ALBUMIN/GLOB SERPL: 1.6 {RATIO} (ref 1–2.5)
ALP SERPL-CCNC: 75 U/L (ref 40–129)
ALT SERPL-CCNC: 52 U/L (ref 5–41)
ANION GAP SERPL CALCULATED.3IONS-SCNC: 11 MMOL/L (ref 9–17)
AST SERPL-CCNC: 40 U/L
BASOPHILS # BLD: 0.03 K/UL (ref 0–0.2)
BASOPHILS NFR BLD: 1 % (ref 0–2)
BILIRUB SERPL-MCNC: 1 MG/DL (ref 0.3–1.2)
BUN SERPL-MCNC: 17 MG/DL (ref 6–20)
CALCIUM SERPL-MCNC: 9.6 MG/DL (ref 8.6–10.4)
CHLORIDE SERPL-SCNC: 105 MMOL/L (ref 98–107)
CHOLEST SERPL-MCNC: 114 MG/DL (ref 0–199)
CHOLESTEROL/HDL RATIO: 3.4
CO2 SERPL-SCNC: 25 MMOL/L (ref 20–31)
CREAT SERPL-MCNC: 0.8 MG/DL (ref 0.7–1.2)
EOSINOPHIL # BLD: 0.19 K/UL (ref 0–0.4)
EOSINOPHILS RELATIVE PERCENT: 3 % (ref 0–5)
ERYTHROCYTE [DISTWIDTH] IN BLOOD BY AUTOMATED COUNT: 12.6 % (ref 12.1–15.2)
GFR, ESTIMATED: >90 ML/MIN/1.73M2
GLUCOSE SERPL-MCNC: 132 MG/DL (ref 70–99)
HBA1C MFR BLD: 6.7 %
HCT VFR BLD AUTO: 46.9 % (ref 41–53)
HDLC SERPL-MCNC: 34 MG/DL
HGB BLD-MCNC: 16 G/DL (ref 13.5–17.5)
IMM GRANULOCYTES # BLD AUTO: 0.01 K/UL (ref 0–0.3)
IMM GRANULOCYTES NFR BLD: 0 % (ref 0–5)
LDLC SERPL CALC-MCNC: 63 MG/DL (ref 0–100)
LYMPHOCYTES NFR BLD: 2.11 K/UL (ref 1–4.8)
LYMPHOCYTES RELATIVE PERCENT: 37 % (ref 13–44)
MCH RBC QN AUTO: 29.5 PG (ref 26–34)
MCHC RBC AUTO-ENTMCNC: 34.1 G/DL (ref 31–37)
MCV RBC AUTO: 86.4 FL (ref 80–100)
MONOCYTES NFR BLD: 0.43 K/UL (ref 0–1)
MONOCYTES NFR BLD: 8 % (ref 5–9)
NEUTROPHILS NFR BLD: 51 % (ref 39–75)
NEUTS SEG NFR BLD: 2.97 K/UL (ref 2.1–6.5)
PLATELET # BLD AUTO: 148 K/UL (ref 140–450)
PMV BLD AUTO: 8.8 FL (ref 6–12)
POTASSIUM SERPL-SCNC: 4.1 MMOL/L (ref 3.7–5.3)
PROT SERPL-MCNC: 7.4 G/DL (ref 6.4–8.3)
RBC # BLD AUTO: 5.43 M/UL (ref 4.5–5.9)
SODIUM SERPL-SCNC: 141 MMOL/L (ref 135–144)
TRIGL SERPL-MCNC: 86 MG/DL
VLDLC SERPL CALC-MCNC: 17 MG/DL (ref 1–30)
WBC OTHER # BLD: 5.7 K/UL (ref 3.5–11)

## 2025-06-03 PROCEDURE — G8427 DOCREV CUR MEDS BY ELIG CLIN: HCPCS | Performed by: NURSE PRACTITIONER

## 2025-06-03 PROCEDURE — 1036F TOBACCO NON-USER: CPT | Performed by: NURSE PRACTITIONER

## 2025-06-03 PROCEDURE — 2022F DILAT RTA XM EVC RTNOPTHY: CPT | Performed by: NURSE PRACTITIONER

## 2025-06-03 PROCEDURE — 3044F HG A1C LEVEL LT 7.0%: CPT | Performed by: NURSE PRACTITIONER

## 2025-06-03 PROCEDURE — 3017F COLORECTAL CA SCREEN DOC REV: CPT | Performed by: NURSE PRACTITIONER

## 2025-06-03 PROCEDURE — G8417 CALC BMI ABV UP PARAM F/U: HCPCS | Performed by: NURSE PRACTITIONER

## 2025-06-03 PROCEDURE — 3075F SYST BP GE 130 - 139MM HG: CPT | Performed by: NURSE PRACTITIONER

## 2025-06-03 PROCEDURE — 3079F DIAST BP 80-89 MM HG: CPT | Performed by: NURSE PRACTITIONER

## 2025-06-03 PROCEDURE — 80061 LIPID PANEL: CPT

## 2025-06-03 PROCEDURE — 99214 OFFICE O/P EST MOD 30 MIN: CPT | Performed by: NURSE PRACTITIONER

## 2025-06-03 PROCEDURE — 83036 HEMOGLOBIN GLYCOSYLATED A1C: CPT | Performed by: NURSE PRACTITIONER

## 2025-06-03 PROCEDURE — 85025 COMPLETE CBC W/AUTO DIFF WBC: CPT

## 2025-06-03 PROCEDURE — 36415 COLL VENOUS BLD VENIPUNCTURE: CPT

## 2025-06-03 PROCEDURE — 80053 COMPREHEN METABOLIC PANEL: CPT

## 2025-06-03 ASSESSMENT — ENCOUNTER SYMPTOMS
DIARRHEA: 0
VOMITING: 0
SHORTNESS OF BREATH: 0
COUGH: 0
NAUSEA: 0

## 2025-06-03 NOTE — PATIENT INSTRUCTIONS
SURVEY:    You may be receiving a survey from [facility] regarding your visit today.    You may get this in the mail, through your MyChart or in your email.     Please complete the survey to enable us to provide the highest quality of care to you and your family.      Thank you.    Clinical Care Team:         NICOL Maya-JOCELYN Cummings                         Triage:         JOCELYN Watson             Clerical Team:        Jill Mckeon       [place] [phone]           Billing questions: [phone]           Medical Records Request: [phone]

## 2025-06-03 NOTE — PROGRESS NOTES
HPI Notes    Name: Viral Kang  : 1970         Chief Complaint:     Chief Complaint   Patient presents with    Diabetes     3 month diabetic check  Taking    Metformin xr 1,000mg    2025- A1C- 6.5%         History of Present Illness:        Diabetes  He presents for his follow-up diabetic visit. He has type 2 diabetes mellitus. His disease course has been stable. There are no hypoglycemic associated symptoms. Pertinent negatives for hypoglycemia include no dizziness, headaches or seizures. Pertinent negatives for diabetes include no chest pain and no fatigue. Symptoms are stable. Risk factors for coronary artery disease include diabetes mellitus. Current diabetic treatment includes diet and oral agent (monotherapy). He is compliant with treatment all of the time. He is following a generally healthy diet. His breakfast blood glucose is taken between 6-7 am.   Hyperlipidemia  This is a chronic problem. The current episode started more than 1 year ago. The problem is controlled. Recent lipid tests were reviewed and are normal. Exacerbating diseases include diabetes and obesity. Factors aggravating his hyperlipidemia include fatty foods. Pertinent negatives include no chest pain or shortness of breath. Current antihyperlipidemic treatment includes statins. The current treatment provides moderate improvement of lipids. There are no compliance problems.  Risk factors for coronary artery disease include diabetes mellitus, dyslipidemia, male sex and obesity.       Past Medical History:     Past Medical History:   Diagnosis Date    Arthritis     Hypertension     on meds x 2yrs    Mixed hyperlipidemia 2023    COLLETTE (obstructive sleep apnea)     On CPAP    Type 2 diabetes mellitus without complication, without long-term current use of insulin ([facility]) 10/26/2021    Unspecified sleep apnea     CPAP nightly use      Reviewed all health maintenance requirements and ordered appropriate tests  Health Maintenance

## 2025-07-18 ENCOUNTER — OFFICE VISIT (OUTPATIENT)
Dept: FAMILY MEDICINE CLINIC | Age: 55
End: 2025-07-18
Payer: COMMERCIAL

## 2025-07-18 VITALS
OXYGEN SATURATION: 99 % | SYSTOLIC BLOOD PRESSURE: 118 MMHG | HEART RATE: 87 BPM | HEIGHT: 75 IN | BODY MASS INDEX: 39.17 KG/M2 | DIASTOLIC BLOOD PRESSURE: 72 MMHG | WEIGHT: 315 LBS

## 2025-07-18 DIAGNOSIS — L03.116 CELLULITIS OF LEFT LOWER EXTREMITY: Primary | ICD-10-CM

## 2025-07-18 PROCEDURE — G8417 CALC BMI ABV UP PARAM F/U: HCPCS | Performed by: NURSE PRACTITIONER

## 2025-07-18 PROCEDURE — 1036F TOBACCO NON-USER: CPT | Performed by: NURSE PRACTITIONER

## 2025-07-18 PROCEDURE — 99213 OFFICE O/P EST LOW 20 MIN: CPT | Performed by: NURSE PRACTITIONER

## 2025-07-18 PROCEDURE — 3017F COLORECTAL CA SCREEN DOC REV: CPT | Performed by: NURSE PRACTITIONER

## 2025-07-18 PROCEDURE — 3078F DIAST BP <80 MM HG: CPT | Performed by: NURSE PRACTITIONER

## 2025-07-18 PROCEDURE — G8427 DOCREV CUR MEDS BY ELIG CLIN: HCPCS | Performed by: NURSE PRACTITIONER

## 2025-07-18 PROCEDURE — 3074F SYST BP LT 130 MM HG: CPT | Performed by: NURSE PRACTITIONER

## 2025-07-18 RX ORDER — CEPHALEXIN 500 MG/1
500 CAPSULE ORAL 3 TIMES DAILY
Qty: 21 CAPSULE | Refills: 0 | Status: SHIPPED | OUTPATIENT
Start: 2025-07-18 | End: 2025-07-25

## 2025-07-18 ASSESSMENT — PATIENT HEALTH QUESTIONNAIRE - PHQ9
SUM OF ALL RESPONSES TO PHQ QUESTIONS 1-9: 0
SUM OF ALL RESPONSES TO PHQ QUESTIONS 1-9: 0
2. FEELING DOWN, DEPRESSED OR HOPELESS: NOT AT ALL
1. LITTLE INTEREST OR PLEASURE IN DOING THINGS: NOT AT ALL
SUM OF ALL RESPONSES TO PHQ QUESTIONS 1-9: 0
SUM OF ALL RESPONSES TO PHQ QUESTIONS 1-9: 0

## 2025-07-18 ASSESSMENT — ENCOUNTER SYMPTOMS
NAUSEA: 0
SHORTNESS OF BREATH: 0
DIARRHEA: 0
COUGH: 0
VOMITING: 0

## 2025-07-18 NOTE — PATIENT INSTRUCTIONS
SURVEY:    You may be receiving a survey from [facility] regarding your visit today.    You may get this in the mail, through your MyChart or in your email.     Please complete the survey to enable us to provide the highest quality of care to you and your family.      Thank you.    Clinical Care Team:         NICOL Maya-JOCELYN Cummings                         Triage:         JOCELYN Watson    Clerical Team:      Jill Mckeon       [place] [phone]           Billing questions: [phone]           Medical Records Request: [phone]

## 2025-07-18 NOTE — PROGRESS NOTES
HPI Notes    Name: Derek Kang  : 1970         Chief Complaint:     Chief Complaint   Patient presents with    Leg Pain     Possible cellulitis infection on the left lower leg.       History of Present Illness:        Leg Pain       Pt is a 55 yo male who reports for swelling, pain, warmth of left lower leg. Pt is a known diabetic. Pt stepped off a trailer and scraped his left lower leg causing some abrasions. Pain started last night and spouse thinks the area feels warm to touch. Pt states he has low energy.     Past Medical History:     Past Medical History:   Diagnosis Date    Arthritis     Hypertension     on meds x 2yrs    Mixed hyperlipidemia 2023    COLLETTE (obstructive sleep apnea)     On CPAP    Type 2 diabetes mellitus without complication, without long-term current use of insulin (HCC) 10/26/2021    Unspecified sleep apnea     CPAP nightly use      Reviewed all health maintenance requirements and ordered appropriate tests  Health Maintenance Due   Topic Date Due    HIV screen  Never done    Hepatitis C screen  Never done    Hepatitis B vaccine (1 of 3 - 19+ 3-dose series) Never done    Pneumococcal 50+ years Vaccine (1 of 2 - PCV) Never done    Shingles vaccine (1 of 2) Never done    COVID-19 Vaccine ( - - season) 2024    Diabetic retinal exam  2025       Past Surgical History:     Past Surgical History:   Procedure Laterality Date    CERVICAL FUSION N/A 2019    A.C.D.F. C 6-7 performed by Dean Cortez MD at [facility] [place]    COLONOSCOPY N/A 2022    COLONOSCOPY POLYPECTOMY HOT BIOPSY performed by Peter Loomis MD at [facility] ENDOSCOPY    FRACTURE SURGERY Left     left hip surgical repair post fx    HIP SURGERY Left     plates    JOINT REPLACEMENT Left     hip    TONSILLECTOMY          Medications:       Prior to Admission medications    Medication Sig Start Date End Date Taking? Authorizing Provider   cephALEXin (KEFLEX) 500 MG capsule Take 1 capsule by mouth 3

## 2025-08-14 DIAGNOSIS — E11.9 TYPE 2 DIABETES MELLITUS WITHOUT COMPLICATION, WITHOUT LONG-TERM CURRENT USE OF INSULIN (HCC): ICD-10-CM

## 2025-08-14 RX ORDER — METFORMIN HYDROCHLORIDE 500 MG/1
1000 TABLET, EXTENDED RELEASE ORAL
Qty: 60 TABLET | Refills: 2 | Status: SHIPPED | OUTPATIENT
Start: 2025-08-14

## 2025-08-25 ENCOUNTER — TELEPHONE (OUTPATIENT)
Dept: FAMILY MEDICINE CLINIC | Age: 55
End: 2025-08-25

## 2025-08-28 ENCOUNTER — OFFICE VISIT (OUTPATIENT)
Dept: FAMILY MEDICINE CLINIC | Age: 55
End: 2025-08-28

## 2025-08-28 VITALS
SYSTOLIC BLOOD PRESSURE: 114 MMHG | DIASTOLIC BLOOD PRESSURE: 72 MMHG | BODY MASS INDEX: 39.17 KG/M2 | OXYGEN SATURATION: 96 % | HEART RATE: 72 BPM | HEIGHT: 75 IN | WEIGHT: 315 LBS

## 2025-08-28 DIAGNOSIS — Z09 HOSPITAL DISCHARGE FOLLOW-UP: ICD-10-CM

## 2025-08-28 DIAGNOSIS — A41.9 SEPSIS WITHOUT ACUTE ORGAN DYSFUNCTION, DUE TO UNSPECIFIED ORGANISM (HCC): ICD-10-CM

## 2025-08-28 DIAGNOSIS — E11.9 TYPE 2 DIABETES MELLITUS WITHOUT COMPLICATION, WITHOUT LONG-TERM CURRENT USE OF INSULIN (HCC): ICD-10-CM

## 2025-08-28 DIAGNOSIS — L03.116 CELLULITIS OF LEFT ANTERIOR LOWER LEG: Primary | ICD-10-CM

## 2025-08-28 RX ORDER — METFORMIN HYDROCHLORIDE 500 MG/1
1000 TABLET, EXTENDED RELEASE ORAL 2 TIMES DAILY WITH MEALS
Qty: 120 TABLET | Refills: 2 | Status: SHIPPED | OUTPATIENT
Start: 2025-08-28